# Patient Record
Sex: FEMALE | Race: WHITE | Employment: OTHER | URBAN - METROPOLITAN AREA
[De-identification: names, ages, dates, MRNs, and addresses within clinical notes are randomized per-mention and may not be internally consistent; named-entity substitution may affect disease eponyms.]

---

## 2017-01-02 ENCOUNTER — APPOINTMENT (OUTPATIENT)
Dept: SPEECH THERAPY | Facility: CLINIC | Age: 82
DRG: 641 | End: 2017-01-02
Payer: MEDICARE

## 2017-03-06 ENCOUNTER — MEDICAL CORRESPONDENCE (OUTPATIENT)
Dept: HEALTH INFORMATION MANAGEMENT | Facility: CLINIC | Age: 82
End: 2017-03-06

## 2017-04-21 ENCOUNTER — HOSPITAL ENCOUNTER (OUTPATIENT)
Dept: ULTRASOUND IMAGING | Facility: CLINIC | Age: 82
Discharge: HOME | End: 2017-04-21
Attending: PHYSICIAN ASSISTANT | Admitting: PHYSICIAN ASSISTANT
Payer: MEDICARE

## 2017-04-21 ENCOUNTER — OFFICE VISIT (OUTPATIENT)
Dept: URGENT CARE | Facility: URGENT CARE | Age: 82
End: 2017-04-21
Payer: MEDICARE

## 2017-04-21 VITALS
DIASTOLIC BLOOD PRESSURE: 80 MMHG | TEMPERATURE: 98.1 F | OXYGEN SATURATION: 91 % | BODY MASS INDEX: 23.03 KG/M2 | WEIGHT: 114 LBS | HEART RATE: 89 BPM | SYSTOLIC BLOOD PRESSURE: 124 MMHG

## 2017-04-21 DIAGNOSIS — I87.2 VENOUS (PERIPHERAL) INSUFFICIENCY: ICD-10-CM

## 2017-04-21 DIAGNOSIS — I87.2 VENOUS (PERIPHERAL) INSUFFICIENCY: Primary | ICD-10-CM

## 2017-04-21 DIAGNOSIS — L20.9 ATOPIC DERMATITIS, UNSPECIFIED TYPE: ICD-10-CM

## 2017-04-21 DIAGNOSIS — R22.43 LOCALIZED SWELLING OF BOTH LOWER LEGS: ICD-10-CM

## 2017-04-21 LAB
ALBUMIN SERPL-MCNC: 3.6 G/DL (ref 3.4–5)
ALP SERPL-CCNC: 67 U/L (ref 40–150)
ALT SERPL W P-5'-P-CCNC: 14 U/L (ref 0–50)
ANION GAP SERPL CALCULATED.3IONS-SCNC: 8 MMOL/L (ref 3–14)
AST SERPL W P-5'-P-CCNC: 9 U/L (ref 0–45)
BASOPHILS # BLD AUTO: 0 10E9/L (ref 0–0.2)
BASOPHILS NFR BLD AUTO: 0.4 %
BILIRUB SERPL-MCNC: 0.3 MG/DL (ref 0.2–1.3)
BUN SERPL-MCNC: 17 MG/DL (ref 7–30)
CALCIUM SERPL-MCNC: 9 MG/DL (ref 8.5–10.1)
CHLORIDE SERPL-SCNC: 107 MMOL/L (ref 94–109)
CO2 SERPL-SCNC: 27 MMOL/L (ref 20–32)
CREAT SERPL-MCNC: 1 MG/DL (ref 0.52–1.04)
D DIMER PPP FEU-MCNC: 1.1 UG/ML FEU (ref 0–0.5)
DIFFERENTIAL METHOD BLD: ABNORMAL
EOSINOPHIL # BLD AUTO: 0.6 10E9/L (ref 0–0.7)
EOSINOPHIL NFR BLD AUTO: 5.4 %
ERYTHROCYTE [DISTWIDTH] IN BLOOD BY AUTOMATED COUNT: 13.2 % (ref 10–15)
GFR SERPL CREATININE-BSD FRML MDRD: 52 ML/MIN/1.7M2
GLUCOSE SERPL-MCNC: 115 MG/DL (ref 70–99)
HCT VFR BLD AUTO: 34.8 % (ref 35–47)
HGB BLD-MCNC: 11 G/DL (ref 11.7–15.7)
LYMPHOCYTES # BLD AUTO: 2.1 10E9/L (ref 0.8–5.3)
LYMPHOCYTES NFR BLD AUTO: 20.1 %
MCH RBC QN AUTO: 32 PG (ref 26.5–33)
MCHC RBC AUTO-ENTMCNC: 31.6 G/DL (ref 31.5–36.5)
MCV RBC AUTO: 101 FL (ref 78–100)
MONOCYTES # BLD AUTO: 0.9 10E9/L (ref 0–1.3)
MONOCYTES NFR BLD AUTO: 8.3 %
NEUTROPHILS # BLD AUTO: 6.8 10E9/L (ref 1.6–8.3)
NEUTROPHILS NFR BLD AUTO: 65.8 %
PLATELET # BLD AUTO: 293 10E9/L (ref 150–450)
POTASSIUM SERPL-SCNC: 4 MMOL/L (ref 3.4–5.3)
PROT SERPL-MCNC: 7.8 G/DL (ref 6.8–8.8)
RBC # BLD AUTO: 3.44 10E12/L (ref 3.8–5.2)
SODIUM SERPL-SCNC: 142 MMOL/L (ref 133–144)
WBC # BLD AUTO: 10.3 10E9/L (ref 4–11)

## 2017-04-21 PROCEDURE — 36415 COLL VENOUS BLD VENIPUNCTURE: CPT | Performed by: PHYSICIAN ASSISTANT

## 2017-04-21 PROCEDURE — 80053 COMPREHEN METABOLIC PANEL: CPT | Performed by: PHYSICIAN ASSISTANT

## 2017-04-21 PROCEDURE — 85025 COMPLETE CBC W/AUTO DIFF WBC: CPT | Performed by: PHYSICIAN ASSISTANT

## 2017-04-21 PROCEDURE — 85379 FIBRIN DEGRADATION QUANT: CPT | Performed by: PHYSICIAN ASSISTANT

## 2017-04-21 PROCEDURE — 93970 EXTREMITY STUDY: CPT

## 2017-04-21 PROCEDURE — 99214 OFFICE O/P EST MOD 30 MIN: CPT | Performed by: PHYSICIAN ASSISTANT

## 2017-04-21 RX ORDER — TRIAMCINOLONE ACETONIDE 5 MG/G
CREAM TOPICAL
Qty: 30 G | Refills: 3 | Status: ON HOLD | OUTPATIENT
Start: 2017-04-21 | End: 2017-05-22

## 2017-04-21 RX ORDER — HYDROCHLOROTHIAZIDE 12.5 MG/1
12.5 TABLET ORAL DAILY
Qty: 30 TABLET | Refills: 0 | Status: SHIPPED | OUTPATIENT
Start: 2017-04-21 | End: 2017-04-21

## 2017-04-21 RX ORDER — HYDROCHLOROTHIAZIDE 12.5 MG/1
12.5 TABLET ORAL DAILY
Qty: 30 TABLET | Refills: 0 | Status: ON HOLD | OUTPATIENT
Start: 2017-04-21 | End: 2017-05-22

## 2017-04-21 NOTE — MR AVS SNAPSHOT
"              After Visit Summary   2017    Laya Lutz    MRN: [id number]           Patient Information     Date Of Birth          3/13/1926        Visit Information        Provider Department      2017 1:45 PM Joshua Jose PA-C [facility]        Today's Diagnoses     Venous (peripheral) insufficiency    -  1    Localized swelling of both lower legs        Atopic dermatitis, unspecified type           Follow-ups after your visit        Who to contact     If you have questions or need follow up information about today's clinic visit or your schedule please contact [facility] directly at [phone].  Normal or non-critical lab and imaging results will be communicated to you by MyChart, letter or phone within 4 business days after the clinic has received the results. If you do not hear from us within 7 days, please contact the clinic through MyChart or phone. If you have a critical or abnormal lab result, we will notify you by phone as soon as possible.  Submit refill requests through [employer] or call your pharmacy and they will forward the refill request to us. Please allow 3 business days for your refill to be completed.          Additional Information About Your Visit        MyChart Information     [employer] lets you send messages to your doctor, view your test results, renew your prescriptions, schedule appointments and more. To sign up, go to [email or web address] . Click on \"Log in\" on the left side of the screen, which will take you to the Welcome page. Then click on \"Sign up Now\" on the right side of the page.     You will be asked to enter the access code listed below, as well as some personal information. Please follow the directions to create your username and password.     Your access code is: [email or web address]  Expires: 2017  9:23 AM     Your access code will  in 90 days. If you need help or a new code, please call your " [facility] or [phone].        Care EveryWhere ID     This is your Care EveryWhere ID. This could be used by other organizations to access your [place] medical records  [id number]        Your Vitals Were     Pulse Temperature Pulse Oximetry BMI (Body Mass Index)          89 98.1  F (36.7  C) (Oral) 91% 23.03 kg/m2         Blood Pressure from Last 3 Encounters:   04/21/17 124/80   01/02/17 139/81   04/04/16 144/78    Weight from Last 3 Encounters:   04/21/17 114 lb (51.7 kg)   01/02/17 104 lb 15 oz (47.6 kg)   04/02/16 106 lb 4.8 oz (48.2 kg)              We Performed the Following     CBC with platelets differential     Comprehensive metabolic panel     D dimer quantitative          Today's Medication Changes          These changes are accurate as of: 4/21/17 11:59 PM.  If you have any questions, ask your nurse or doctor.               Start taking these medicines.        Dose/Directions    hydrochlorothiazide 12.5 MG Tabs tablet   Used for:  Localized swelling of both lower legs   Started by:  Joshua Jose PA-C        Dose:  12.5 mg   Take 1 tablet (12.5 mg) by mouth daily   Quantity:  30 tablet   Refills:  0       triamcinolone 0.5 % cream   Commonly known as:  KENALOG   Used for:  Atopic dermatitis, unspecified type   Started by:  Joshua Jose PA-C        Apply sparingly to affected area three times daily.   Quantity:  30 g   Refills:  3            Where to get your medicines      These medications were sent to [facility] [address] [place] [address] [address] AT [facility]  [address], [facility] [address]    Hours:  24-hours Phone:  [phone]     hydrochlorothiazide 12.5 MG Tabs tablet    triamcinolone 0.5 % cream                Primary Care Provider Office Phone # Fax #    Dayton Huston [phone]       [address] [address]        Thank you!     Thank you for choosing  [facility]  for your care. Our goal is always to provide you with excellent care. Hearing back from our patients is one way we can continue to improve our services. Please take a few minutes to complete the written survey that you may receive in the mail after your visit with us. Thank you!             Your Updated Medication List - Protect others around you: Learn how to safely use, store and throw away your medicines at www.disposemymeds.org.          This list is accurate as of: 4/21/17 11:59 PM.  Always use your most recent med list.                   Brand Name Dispense Instructions for use    acetaminophen 325 MG tablet    TYLENOL    100 tablet    Take 2 tablets (650 mg) by mouth every 4 hours as needed for mild pain       albuterol 108 (90 BASE) MCG/ACT Inhaler   Generic drug:  albuterol     2 Inhaler    INHALE 2 PUFFS BY MOUTH EVERY 4 TO 6 HOURS AS NEEDED FOR WHEEZE       AMLODIPINE BESYLATE PO      Take 5 mg by mouth daily       aspirin 81 MG EC tablet      Take 1 tablet (81 mg) by mouth daily       CENTRUM SILVER per tablet      Take 1 tablet by mouth daily.       citalopram 40 MG tablet    celeXA    90 tablet    TAKE ONE TABLET BY MOUTH DAILY       FOSAMAX PO      Take 70 mg by mouth once a week       GABAPENTIN PO      Take 900 mg by mouth At Bedtime (takes 3 x 300 mg caps)       hydrochlorothiazide 12.5 MG Tabs tablet     30 tablet    Take 1 tablet (12.5 mg) by mouth daily       HYDROcodone-acetaminophen  MG per tablet    NORCO    10 tablet    Take 1 tablet by mouth every 6 hours as needed for moderate to severe pain Up to 3 times daily as needed       LISINOPRIL PO      Take 40 mg by mouth daily       metoprolol 25 MG 24 hr tablet    TOPROL-XL    90 tablet    Take 1 tablet by mouth daily.       omeprazole 20 MG CR capsule    priLOSEC    30 capsule    TAKE ONE CAPSULE BY MOUTH DAILY 30 MINUTES BEFORE BREAKFAST       order for DME     1 Device    Equipment being  ordered: rib belt       SERTRALINE HCL PO      Take 50 mg by mouth daily       SINGULAIR 10 MG tablet   Generic drug:  montelukast      Take 10 mg by mouth At Bedtime.       triamcinolone 0.5 % cream    KENALOG    30 g    Apply sparingly to affected area three times daily.

## 2017-04-21 NOTE — PROGRESS NOTES
[facility] radiology called and informed that there was no blood clot   I spoke to Pt's daughter who is with pt and notified about the result   Pt would start HCTZ from today and also do compression stockings   If symptoms don't get better should f/u with PCP   Myrna Chan MD

## 2017-04-21 NOTE — NURSING NOTE
"Chief Complaint   Patient presents with     Swelling     in lower legs x 2 days       Initial /80 (BP Location: Right arm, Patient Position: Chair, Cuff Size: Adult Regular)  Pulse 89  Temp 98.1  F (36.7  C) (Oral)  Wt 114 lb (51.7 kg)  SpO2 91%  BMI 23.03 kg/m2 Estimated body mass index is 23.03 kg/(m^2) as calculated from the following:    Height as of 12/28/16: 4' 11\" (1.499 m).    Weight as of this encounter: 114 lb (51.7 kg).  Medication Reconciliation: complete  "

## 2017-04-24 NOTE — PROGRESS NOTES
SUBJECTIVE:   Laya Lutz is a 89 year old female presenting with a chief complaint of lower leg swelling bilaterally.  Onset of symptoms was few days  Course of illness is same.    Severity moderate  Current and Associated symptoms: lower leg swelling  Treatment measures tried include OTC meds.  Predisposing factors include none.    Past Medical History:   Diagnosis Date     Asthma      Depression      Unspecified essential hypertension         Allergies   Allergen Reactions     Fish Allergy      Throat swelled, can tolerate shrimp,crab     Novocain [Procaine Hcl] Anaphylaxis     Face swelled difficulty breathing     Fosamax [Alendronate Sodium]      Penicillins      Unsure of reaction         Social History   Substance Use Topics     Smoking status: Never Smoker     Smokeless tobacco: Never Used     Alcohol use Yes      Comment: occasionally       ROS:  CONSTITUTIONAL:NEGATIVE for fever, chills, change in weight  INTEGUMENTARY/SKIN: POSITIVE for lower leg swelling  ENT/MOUTH: NEGATIVE for ear, mouth and throat problems  RESP:NEGATIVE for significant cough or SOB  CV: NEGATIVE for chest pain, palpitations or peripheral edema  GI: NEGATIVE for nausea, abdominal pain, heartburn, or change in bowel habits  MUSCULOSKELETAL: Positive for edema lower legs bilaterally  NEURO: NEGATIVE for weakness, dizziness or paresthesias    OBJECTIVE  :/80 (BP Location: Right arm, Patient Position: Chair, Cuff Size: Adult Regular)  Pulse 89  Temp 98.1  F (36.7  C) (Oral)  Wt 114 lb (51.7 kg)  SpO2 91%  BMI 23.03 kg/m2  GENERAL APPEARANCE: healthy, alert and no distress  EYES: EOMI,  PERRL, conjunctiva clear  HENT: ear canals and TM's normal.  Nose and mouth without ulcers, erythema or lesions  NECK: supple, nontender, no lymphadenopathy  RESP: lungs clear to auscultation - no rales, rhonchi or wheezes  CV: regular rates and rhythm, normal S1 S2, no murmur noted  ABDOMEN:  soft, nontender, no HSM or masses and bowel  sounds normal  Extremities: Positive for lower leg edema  MS: Positive for lower leg swelling bilaterally  NEURO: Normal strength and tone, sensory exam grossly normal,  normal speech and mentation  SKIN: no suspicious lesions or rashes    Results for orders placed or performed in visit on 04/21/17   CBC with platelets differential   Result Value Ref Range    WBC 10.3 4.0 - 11.0 10e9/L    RBC Count 3.44 (L) 3.8 - 5.2 10e12/L    Hemoglobin 11.0 (L) 11.7 - 15.7 g/dL    Hematocrit 34.8 (L) 35.0 - 47.0 %     (H) 78 - 100 fl    MCH 32.0 26.5 - 33.0 pg    MCHC 31.6 31.5 - 36.5 g/dL    RDW 13.2 10.0 - 15.0 %    Platelet Count 293 150 - 450 10e9/L    Diff Method Automated Method     % Neutrophils 65.8 %    % Lymphocytes 20.1 %    % Monocytes 8.3 %    % Eosinophils 5.4 %    % Basophils 0.4 %    Absolute Neutrophil 6.8 1.6 - 8.3 10e9/L    Absolute Lymphocytes 2.1 0.8 - 5.3 10e9/L    Absolute Monocytes 0.9 0.0 - 1.3 10e9/L    Absolute Eosinophils 0.6 0.0 - 0.7 10e9/L    Absolute Basophils 0.0 0.0 - 0.2 10e9/L   Comprehensive metabolic panel   Result Value Ref Range    Sodium 142 133 - 144 mmol/L    Potassium 4.0 3.4 - 5.3 mmol/L    Chloride 107 94 - 109 mmol/L    Carbon Dioxide 27 20 - 32 mmol/L    Anion Gap 8 3 - 14 mmol/L    Glucose 115 (H) 70 - 99 mg/dL    Urea Nitrogen 17 7 - 30 mg/dL    Creatinine 1.00 0.52 - 1.04 mg/dL    GFR Estimate 52 (L) >60 mL/min/1.7m2    GFR Estimate If Black 63 >60 mL/min/1.7m2    Calcium 9.0 8.5 - 10.1 mg/dL    Bilirubin Total 0.3 0.2 - 1.3 mg/dL    Albumin 3.6 3.4 - 5.0 g/dL    Protein Total 7.8 6.8 - 8.8 g/dL    Alkaline Phosphatase 67 40 - 150 U/L    ALT 14 0 - 50 U/L    AST 9 0 - 45 U/L   D dimer quantitative   Result Value Ref Range    D Dimer 1.1 (H) 0.0 - 0.50 ug/ml FEU     Leg ultrasound negative for DVT bilaterally    ASSESSMENT/PLAN:      ICD-10-CM    1. Venous (peripheral) insufficiency I87.2 CBC with platelets differential     Comprehensive metabolic panel     D dimer  quantitative     US Lower Extremity Venous Duplex Bilateral   2. Localized swelling of both lower legs R22.43 CBC with platelets differential     Comprehensive metabolic panel     D dimer quantitative     US Lower Extremity Venous Duplex Bilateral     hydrochlorothiazide 12.5 MG TABS tablet     DISCONTINUED: hydrochlorothiazide 12.5 MG TABS tablet   3. Atopic dermatitis, unspecified type L20.9 triamcinolone (KENALOG) 0.5 % cream       Left elevation  Compression stockings  Follow up with PCP as needed        See orders in Epic

## 2017-05-22 ENCOUNTER — HOSPITAL ENCOUNTER (INPATIENT)
Facility: CLINIC | Age: 82
LOS: 3 days | Discharge: NURSING, REHAB OR LONG-TERM CARE | DRG: 202 | End: 2017-05-25
Attending: EMERGENCY MEDICINE | Admitting: HOSPITALIST
Payer: MEDICARE

## 2017-05-22 ENCOUNTER — APPOINTMENT (OUTPATIENT)
Dept: GENERAL RADIOLOGY | Facility: CLINIC | Age: 82
DRG: 202 | End: 2017-05-22
Attending: EMERGENCY MEDICINE
Payer: MEDICARE

## 2017-05-22 DIAGNOSIS — R53.1 GENERAL WEAKNESS: ICD-10-CM

## 2017-05-22 DIAGNOSIS — J45.21 MILD INTERMITTENT ASTHMA WITH ACUTE EXACERBATION: Primary | ICD-10-CM

## 2017-05-22 DIAGNOSIS — J45.901 ASTHMA EXACERBATION: ICD-10-CM

## 2017-05-22 DIAGNOSIS — I48.91 ATRIAL FIBRILLATION WITH RVR (H): ICD-10-CM

## 2017-05-22 DIAGNOSIS — I10 ESSENTIAL HYPERTENSION: ICD-10-CM

## 2017-05-22 LAB
ALBUMIN UR-MCNC: 100 MG/DL
ANION GAP SERPL CALCULATED.3IONS-SCNC: 13 MMOL/L (ref 3–14)
APPEARANCE UR: ABNORMAL
BACTERIA #/AREA URNS HPF: ABNORMAL /HPF
BASOPHILS # BLD AUTO: 0 10E9/L (ref 0–0.2)
BASOPHILS NFR BLD AUTO: 0.4 %
BILIRUB UR QL STRIP: NEGATIVE
BUN SERPL-MCNC: 23 MG/DL (ref 7–30)
CALCIUM SERPL-MCNC: 9.3 MG/DL (ref 8.5–10.1)
CHLORIDE SERPL-SCNC: 101 MMOL/L (ref 94–109)
CO2 SERPL-SCNC: 22 MMOL/L (ref 20–32)
COLOR UR AUTO: YELLOW
CREAT SERPL-MCNC: 0.97 MG/DL (ref 0.52–1.04)
DIFFERENTIAL METHOD BLD: NORMAL
EOSINOPHIL # BLD AUTO: 0.1 10E9/L (ref 0–0.7)
EOSINOPHIL NFR BLD AUTO: 0.8 %
ERYTHROCYTE [DISTWIDTH] IN BLOOD BY AUTOMATED COUNT: 13.5 % (ref 10–15)
GFR SERPL CREATININE-BSD FRML MDRD: 54 ML/MIN/1.7M2
GLUCOSE SERPL-MCNC: 123 MG/DL (ref 70–99)
GLUCOSE UR STRIP-MCNC: NEGATIVE MG/DL
GRAM STN SPEC: NORMAL
HCT VFR BLD AUTO: 38.5 % (ref 35–47)
HGB BLD-MCNC: 12.9 G/DL (ref 11.7–15.7)
HGB UR QL STRIP: ABNORMAL
IMM GRANULOCYTES # BLD: 0 10E9/L (ref 0–0.4)
IMM GRANULOCYTES NFR BLD: 0.4 %
INTERPRETATION ECG - MUSE: NORMAL
KETONES UR STRIP-MCNC: 10 MG/DL
LACTATE BLD-SCNC: 1.4 MMOL/L (ref 0.7–2.1)
LEUKOCYTE ESTERASE UR QL STRIP: ABNORMAL
LYMPHOCYTES # BLD AUTO: 2 10E9/L (ref 0.8–5.3)
LYMPHOCYTES NFR BLD AUTO: 25.3 %
Lab: NORMAL
MAGNESIUM SERPL-MCNC: 1.6 MG/DL (ref 1.6–2.3)
MCH RBC QN AUTO: 31 PG (ref 26.5–33)
MCHC RBC AUTO-ENTMCNC: 33.5 G/DL (ref 31.5–36.5)
MCV RBC AUTO: 93 FL (ref 78–100)
MICRO REPORT STATUS: NORMAL
MONOCYTES # BLD AUTO: 0.6 10E9/L (ref 0–1.3)
MONOCYTES NFR BLD AUTO: 7.6 %
NEUTROPHILS # BLD AUTO: 5.1 10E9/L (ref 1.6–8.3)
NEUTROPHILS NFR BLD AUTO: 65.5 %
NITRATE UR QL: NEGATIVE
PH UR STRIP: 6 PH (ref 5–7)
PLATELET # BLD AUTO: 286 10E9/L (ref 150–450)
POTASSIUM SERPL-SCNC: 3.8 MMOL/L (ref 3.4–5.3)
RBC # BLD AUTO: 4.16 10E12/L (ref 3.8–5.2)
RBC #/AREA URNS AUTO: 1 /HPF (ref 0–2)
SODIUM SERPL-SCNC: 136 MMOL/L (ref 133–144)
SP GR UR STRIP: 1.02 (ref 1–1.03)
SPECIMEN SOURCE: NORMAL
TROPONIN I SERPL-MCNC: NORMAL UG/L (ref 0–0.04)
TROPONIN I SERPL-MCNC: NORMAL UG/L (ref 0–0.04)
TSH SERPL DL<=0.005 MIU/L-ACNC: 1.25 MU/L (ref 0.4–4)
URN SPEC COLLECT METH UR: ABNORMAL
UROBILINOGEN UR STRIP-MCNC: NORMAL MG/DL (ref 0–2)
WBC # BLD AUTO: 7.7 10E9/L (ref 4–11)
WBC #/AREA URNS AUTO: 10 /HPF (ref 0–2)

## 2017-05-22 PROCEDURE — 85025 COMPLETE CBC W/AUTO DIFF WBC: CPT | Performed by: EMERGENCY MEDICINE

## 2017-05-22 PROCEDURE — 25000125 ZZHC RX 250: Performed by: HOSPITALIST

## 2017-05-22 PROCEDURE — A9270 NON-COVERED ITEM OR SERVICE: HCPCS | Mod: GY | Performed by: EMERGENCY MEDICINE

## 2017-05-22 PROCEDURE — 25000125 ZZHC RX 250: Performed by: EMERGENCY MEDICINE

## 2017-05-22 PROCEDURE — 99207 ZZC DOWN CODE DUE TO INITIAL EXAM: CPT | Performed by: HOSPITALIST

## 2017-05-22 PROCEDURE — 83605 ASSAY OF LACTIC ACID: CPT | Performed by: HOSPITALIST

## 2017-05-22 PROCEDURE — 96374 THER/PROPH/DIAG INJ IV PUSH: CPT

## 2017-05-22 PROCEDURE — 25000128 H RX IP 250 OP 636: Performed by: EMERGENCY MEDICINE

## 2017-05-22 PROCEDURE — 99285 EMERGENCY DEPT VISIT HI MDM: CPT | Mod: 25

## 2017-05-22 PROCEDURE — 36415 COLL VENOUS BLD VENIPUNCTURE: CPT | Performed by: HOSPITALIST

## 2017-05-22 PROCEDURE — 87070 CULTURE OTHR SPECIMN AEROBIC: CPT | Performed by: HOSPITALIST

## 2017-05-22 PROCEDURE — 87040 BLOOD CULTURE FOR BACTERIA: CPT | Performed by: EMERGENCY MEDICINE

## 2017-05-22 PROCEDURE — 36415 COLL VENOUS BLD VENIPUNCTURE: CPT

## 2017-05-22 PROCEDURE — 94640 AIRWAY INHALATION TREATMENT: CPT

## 2017-05-22 PROCEDURE — 84484 ASSAY OF TROPONIN QUANT: CPT | Performed by: HOSPITALIST

## 2017-05-22 PROCEDURE — 99221 1ST HOSP IP/OBS SF/LOW 40: CPT | Mod: AI | Performed by: HOSPITALIST

## 2017-05-22 PROCEDURE — 40000275 ZZH STATISTIC RCP TIME EA 10 MIN

## 2017-05-22 PROCEDURE — 83735 ASSAY OF MAGNESIUM: CPT | Performed by: HOSPITALIST

## 2017-05-22 PROCEDURE — 25000132 ZZH RX MED GY IP 250 OP 250 PS 637: Mod: GY | Performed by: HOSPITALIST

## 2017-05-22 PROCEDURE — 87205 SMEAR GRAM STAIN: CPT | Performed by: HOSPITALIST

## 2017-05-22 PROCEDURE — 25000132 ZZH RX MED GY IP 250 OP 250 PS 637: Mod: GY | Performed by: EMERGENCY MEDICINE

## 2017-05-22 PROCEDURE — 93005 ELECTROCARDIOGRAM TRACING: CPT

## 2017-05-22 PROCEDURE — 87088 URINE BACTERIA CULTURE: CPT | Performed by: HOSPITALIST

## 2017-05-22 PROCEDURE — 87086 URINE CULTURE/COLONY COUNT: CPT | Performed by: HOSPITALIST

## 2017-05-22 PROCEDURE — 94640 AIRWAY INHALATION TREATMENT: CPT | Mod: 76

## 2017-05-22 PROCEDURE — 71020 XR CHEST 2 VW: CPT

## 2017-05-22 PROCEDURE — 80048 BASIC METABOLIC PNL TOTAL CA: CPT | Performed by: EMERGENCY MEDICINE

## 2017-05-22 PROCEDURE — 25000128 H RX IP 250 OP 636: Performed by: HOSPITALIST

## 2017-05-22 PROCEDURE — 12000000 ZZH R&B MED SURG/OB

## 2017-05-22 PROCEDURE — 81001 URINALYSIS AUTO W/SCOPE: CPT | Performed by: EMERGENCY MEDICINE

## 2017-05-22 PROCEDURE — 84443 ASSAY THYROID STIM HORMONE: CPT | Performed by: HOSPITALIST

## 2017-05-22 PROCEDURE — 87186 SC STD MICRODIL/AGAR DIL: CPT | Performed by: HOSPITALIST

## 2017-05-22 PROCEDURE — A9270 NON-COVERED ITEM OR SERVICE: HCPCS | Mod: GY | Performed by: HOSPITALIST

## 2017-05-22 RX ORDER — METOPROLOL SUCCINATE 25 MG/1
25 TABLET, EXTENDED RELEASE ORAL DAILY
Status: DISCONTINUED | OUTPATIENT
Start: 2017-05-22 | End: 2017-05-25 | Stop reason: HOSPADM

## 2017-05-22 RX ORDER — POTASSIUM CHLORIDE 7.45 MG/ML
10 INJECTION INTRAVENOUS
Status: DISCONTINUED | OUTPATIENT
Start: 2017-05-22 | End: 2017-05-25 | Stop reason: HOSPADM

## 2017-05-22 RX ORDER — HYDROCODONE BITARTRATE AND ACETAMINOPHEN 5; 325 MG/1; MG/1
1 TABLET ORAL 2 TIMES DAILY PRN
Status: ON HOLD | COMMUNITY
End: 2017-05-25

## 2017-05-22 RX ORDER — SODIUM CHLORIDE 9 MG/ML
INJECTION, SOLUTION INTRAVENOUS CONTINUOUS
Status: DISCONTINUED | OUTPATIENT
Start: 2017-05-22 | End: 2017-05-23

## 2017-05-22 RX ORDER — MONTELUKAST SODIUM 10 MG/1
10 TABLET ORAL AT BEDTIME
Status: DISCONTINUED | OUTPATIENT
Start: 2017-05-22 | End: 2017-05-25 | Stop reason: HOSPADM

## 2017-05-22 RX ORDER — BISACODYL 10 MG
10 SUPPOSITORY, RECTAL RECTAL DAILY PRN
Status: DISCONTINUED | OUTPATIENT
Start: 2017-05-22 | End: 2017-05-25 | Stop reason: HOSPADM

## 2017-05-22 RX ORDER — HYDROCODONE BITARTRATE AND ACETAMINOPHEN 5; 325 MG/1; MG/1
1 TABLET ORAL 2 TIMES DAILY PRN
Status: DISCONTINUED | OUTPATIENT
Start: 2017-05-22 | End: 2017-05-25 | Stop reason: HOSPADM

## 2017-05-22 RX ORDER — ASPIRIN 81 MG/1
81 TABLET ORAL DAILY
Status: DISCONTINUED | OUTPATIENT
Start: 2017-05-22 | End: 2017-05-22

## 2017-05-22 RX ORDER — AMLODIPINE BESYLATE 5 MG/1
5 TABLET ORAL DAILY
Status: DISCONTINUED | OUTPATIENT
Start: 2017-05-22 | End: 2017-05-25 | Stop reason: HOSPADM

## 2017-05-22 RX ORDER — NALOXONE HYDROCHLORIDE 0.4 MG/ML
.1-.4 INJECTION, SOLUTION INTRAMUSCULAR; INTRAVENOUS; SUBCUTANEOUS
Status: DISCONTINUED | OUTPATIENT
Start: 2017-05-22 | End: 2017-05-25 | Stop reason: HOSPADM

## 2017-05-22 RX ORDER — LISINOPRIL 40 MG/1
40 TABLET ORAL DAILY
Status: DISCONTINUED | OUTPATIENT
Start: 2017-05-22 | End: 2017-05-25 | Stop reason: HOSPADM

## 2017-05-22 RX ORDER — CITALOPRAM HYDROBROMIDE 40 MG/1
40 TABLET ORAL DAILY
Status: DISCONTINUED | OUTPATIENT
Start: 2017-05-22 | End: 2017-05-25 | Stop reason: HOSPADM

## 2017-05-22 RX ORDER — POTASSIUM CHLORIDE 1500 MG/1
20-40 TABLET, EXTENDED RELEASE ORAL
Status: DISCONTINUED | OUTPATIENT
Start: 2017-05-22 | End: 2017-05-25 | Stop reason: HOSPADM

## 2017-05-22 RX ORDER — POLYETHYLENE GLYCOL 3350 17 G/17G
17 POWDER, FOR SOLUTION ORAL DAILY PRN
Status: DISCONTINUED | OUTPATIENT
Start: 2017-05-22 | End: 2017-05-25 | Stop reason: HOSPADM

## 2017-05-22 RX ORDER — AMOXICILLIN 250 MG
1-2 CAPSULE ORAL 2 TIMES DAILY PRN
Status: DISCONTINUED | OUTPATIENT
Start: 2017-05-22 | End: 2017-05-25 | Stop reason: HOSPADM

## 2017-05-22 RX ORDER — ACETAMINOPHEN 325 MG/1
650 TABLET ORAL EVERY 4 HOURS PRN
Status: DISCONTINUED | OUTPATIENT
Start: 2017-05-22 | End: 2017-05-25 | Stop reason: HOSPADM

## 2017-05-22 RX ORDER — POTASSIUM CL/LIDO/0.9 % NACL 10MEQ/0.1L
10 INTRAVENOUS SOLUTION, PIGGYBACK (ML) INTRAVENOUS
Status: DISCONTINUED | OUTPATIENT
Start: 2017-05-22 | End: 2017-05-25 | Stop reason: HOSPADM

## 2017-05-22 RX ORDER — GABAPENTIN 300 MG/1
900 CAPSULE ORAL AT BEDTIME
Status: DISCONTINUED | OUTPATIENT
Start: 2017-05-22 | End: 2017-05-25 | Stop reason: HOSPADM

## 2017-05-22 RX ORDER — ALBUTEROL SULFATE 0.83 MG/ML
3 SOLUTION RESPIRATORY (INHALATION)
Status: DISCONTINUED | OUTPATIENT
Start: 2017-05-22 | End: 2017-05-25 | Stop reason: HOSPADM

## 2017-05-22 RX ORDER — POTASSIUM CHLORIDE 29.8 MG/ML
20 INJECTION INTRAVENOUS
Status: DISCONTINUED | OUTPATIENT
Start: 2017-05-22 | End: 2017-05-25 | Stop reason: HOSPADM

## 2017-05-22 RX ORDER — ALBUTEROL SULFATE 90 UG/1
2 AEROSOL, METERED RESPIRATORY (INHALATION) 4 TIMES DAILY PRN
Status: DISCONTINUED | OUTPATIENT
Start: 2017-05-22 | End: 2017-05-24

## 2017-05-22 RX ORDER — ONDANSETRON 2 MG/ML
4 INJECTION INTRAMUSCULAR; INTRAVENOUS EVERY 6 HOURS PRN
Status: DISCONTINUED | OUTPATIENT
Start: 2017-05-22 | End: 2017-05-25 | Stop reason: HOSPADM

## 2017-05-22 RX ORDER — CIPROFLOXACIN 2 MG/ML
400 INJECTION, SOLUTION INTRAVENOUS DAILY
Status: DISCONTINUED | OUTPATIENT
Start: 2017-05-22 | End: 2017-05-24

## 2017-05-22 RX ORDER — POTASSIUM CHLORIDE 1.5 G/1.58G
20-40 POWDER, FOR SOLUTION ORAL
Status: DISCONTINUED | OUTPATIENT
Start: 2017-05-22 | End: 2017-05-25 | Stop reason: HOSPADM

## 2017-05-22 RX ORDER — ONDANSETRON 4 MG/1
4 TABLET, ORALLY DISINTEGRATING ORAL EVERY 6 HOURS PRN
Status: DISCONTINUED | OUTPATIENT
Start: 2017-05-22 | End: 2017-05-25 | Stop reason: HOSPADM

## 2017-05-22 RX ORDER — ALBUTEROL SULFATE 0.83 MG/ML
5 SOLUTION RESPIRATORY (INHALATION) ONCE
Status: COMPLETED | OUTPATIENT
Start: 2017-05-22 | End: 2017-05-22

## 2017-05-22 RX ORDER — NALOXONE HYDROCHLORIDE 0.4 MG/ML
.1-.4 INJECTION, SOLUTION INTRAMUSCULAR; INTRAVENOUS; SUBCUTANEOUS
Status: DISCONTINUED | OUTPATIENT
Start: 2017-05-22 | End: 2017-05-23

## 2017-05-22 RX ORDER — ACETAMINOPHEN 325 MG/1
650 TABLET ORAL ONCE
Status: COMPLETED | OUTPATIENT
Start: 2017-05-22 | End: 2017-05-22

## 2017-05-22 RX ORDER — METOPROLOL TARTRATE 1 MG/ML
5 INJECTION, SOLUTION INTRAVENOUS EVERY 6 HOURS
Status: DISCONTINUED | OUTPATIENT
Start: 2017-05-22 | End: 2017-05-23

## 2017-05-22 RX ORDER — METHYLPREDNISOLONE SODIUM SUCCINATE 125 MG/2ML
60 INJECTION, POWDER, LYOPHILIZED, FOR SOLUTION INTRAMUSCULAR; INTRAVENOUS EVERY 12 HOURS
Status: DISCONTINUED | OUTPATIENT
Start: 2017-05-23 | End: 2017-05-23

## 2017-05-22 RX ORDER — ALBUTEROL SULFATE 0.83 MG/ML
3 SOLUTION RESPIRATORY (INHALATION) EVERY 4 HOURS
Status: DISCONTINUED | OUTPATIENT
Start: 2017-05-22 | End: 2017-05-23

## 2017-05-22 RX ORDER — LIDOCAINE 40 MG/G
CREAM TOPICAL
Status: DISCONTINUED | OUTPATIENT
Start: 2017-05-22 | End: 2017-05-25 | Stop reason: HOSPADM

## 2017-05-22 RX ORDER — ACETAMINOPHEN 650 MG/1
650 SUPPOSITORY RECTAL EVERY 4 HOURS PRN
Status: DISCONTINUED | OUTPATIENT
Start: 2017-05-22 | End: 2017-05-25 | Stop reason: HOSPADM

## 2017-05-22 RX ORDER — METHYLPREDNISOLONE SODIUM SUCCINATE 125 MG/2ML
125 INJECTION, POWDER, LYOPHILIZED, FOR SOLUTION INTRAMUSCULAR; INTRAVENOUS ONCE
Status: COMPLETED | OUTPATIENT
Start: 2017-05-22 | End: 2017-05-22

## 2017-05-22 RX ADMIN — ALBUTEROL SULFATE 2.5 MG: 2.5 SOLUTION RESPIRATORY (INHALATION) at 23:54

## 2017-05-22 RX ADMIN — GABAPENTIN 900 MG: 300 CAPSULE ORAL at 20:30

## 2017-05-22 RX ADMIN — ACETAMINOPHEN 650 MG: 325 TABLET, FILM COATED ORAL at 12:34

## 2017-05-22 RX ADMIN — ALBUTEROL SULFATE 2 PUFF: 90 AEROSOL, METERED RESPIRATORY (INHALATION) at 18:09

## 2017-05-22 RX ADMIN — ACETAMINOPHEN 650 MG: 325 TABLET, FILM COATED ORAL at 18:59

## 2017-05-22 RX ADMIN — LISINOPRIL 40 MG: 40 TABLET ORAL at 17:03

## 2017-05-22 RX ADMIN — ALBUTEROL SULFATE 2 PUFF: 90 AEROSOL, METERED RESPIRATORY (INHALATION) at 20:39

## 2017-05-22 RX ADMIN — CITALOPRAM HYDROBROMIDE 40 MG: 40 TABLET ORAL at 17:04

## 2017-05-22 RX ADMIN — METHYLPREDNISOLONE SODIUM SUCCINATE 125 MG: 125 INJECTION, POWDER, FOR SOLUTION INTRAMUSCULAR; INTRAVENOUS at 13:16

## 2017-05-22 RX ADMIN — METOPROLOL TARTRATE 5 MG: 5 INJECTION INTRAVENOUS at 14:59

## 2017-05-22 RX ADMIN — CIPROFLOXACIN 400 MG: 2 INJECTION, SOLUTION INTRAVENOUS at 15:07

## 2017-05-22 RX ADMIN — ALBUTEROL SULFATE 2.5 MG: 2.5 SOLUTION RESPIRATORY (INHALATION) at 15:19

## 2017-05-22 RX ADMIN — MONTELUKAST SODIUM 10 MG: 10 TABLET, FILM COATED ORAL at 20:30

## 2017-05-22 RX ADMIN — SERTRALINE HYDROCHLORIDE 50 MG: 50 TABLET ORAL at 17:04

## 2017-05-22 RX ADMIN — SODIUM CHLORIDE: 9 INJECTION, SOLUTION INTRAVENOUS at 14:25

## 2017-05-22 RX ADMIN — METOPROLOL SUCCINATE 25 MG: 25 TABLET, EXTENDED RELEASE ORAL at 17:12

## 2017-05-22 RX ADMIN — ALBUTEROL SULFATE 2.5 MG: 2.5 SOLUTION RESPIRATORY (INHALATION) at 19:11

## 2017-05-22 RX ADMIN — METOPROLOL TARTRATE 5 MG: 5 INJECTION INTRAVENOUS at 20:30

## 2017-05-22 RX ADMIN — ALBUTEROL SULFATE 5 MG: 2.5 SOLUTION RESPIRATORY (INHALATION) at 11:45

## 2017-05-22 RX ADMIN — AMLODIPINE BESYLATE 5 MG: 5 TABLET ORAL at 17:05

## 2017-05-22 RX ADMIN — OMEPRAZOLE 20 MG: 20 CAPSULE, DELAYED RELEASE ORAL at 17:02

## 2017-05-22 RX ADMIN — HYDROCODONE BITARTRATE AND ACETAMINOPHEN 1 TABLET: 5; 325 TABLET ORAL at 20:02

## 2017-05-22 ASSESSMENT — ENCOUNTER SYMPTOMS
DIARRHEA: 0
COUGH: 1
HEMATURIA: 0
WHEEZING: 1
LIGHT-HEADEDNESS: 0
PALPITATIONS: 0
SHORTNESS OF BREATH: 1
DYSURIA: 0

## 2017-05-22 NOTE — IP AVS SNAPSHOT
` `     [place] [address] [facility]: [phone]                 INTERAGENCY TRANSFER FORM - NOTES (H&P, Discharge Summary, Consults, Procedures, Therapies)   2017                    Hospital Admission Date: 2017  АННА GUZMAN   : 3/13/1926  Sex: Female        Patient PCP Information     Provider PCP Type    Dayton Huston MD General      History & Physicals     No notes of this type exist for this encounter.      Discharge Summaries     No notes of this type exist for this encounter.      Consult Notes     No notes of this type exist for this encounter.         Progress Notes - Physician (Notes from 17 through 17)      Progress Notes by Rosie Valerio LSW at 2017 10:51 AM     Author:  Rosie Valerio LSW Service:  Social Work Author Type:      Filed:  2017 10:54 AM Date of Service:  2017 10:51 AM Creation Time:  2017 10:51 AM    Status:  Signed :  Rosie Valerio LSW ()         Care Transition Initial Assessment -   Reason For Consult: discharge planning, facility placement  Met with: PATIENT    Active Problems:    Asthma exacerbation         DATA  Lives With: alone  Living Arrangements[facility]  Description of Support System: Involved. Per pt, her daughter Etta and granddaughter Shelli check on her daily. Etta sets up her medications. She has two meals per day in the dining room. Does not use home O2.  Who is your support system?: Children  Identified issues/concerns regarding health management: PT/OT recommends TCU. Pt is in agreement. She would like to go to [facility].      Quality Of Family Relationships: involved  Transportation Available: family or friend will provide    ASSESSMENT  Cognitive Status:  Appears alert and oriented.   Concerns to be addressed:  On-going DC planning.     PLAN  Financial costs for the patient includes: None at this time.  Patient given options and choices for discharge: Yes.  Patient/family is agreeable to the plan?  YES  Patient Goals and Preferences: DC to TCU.  Patient anticipates discharging to:  TCU.    MATTY Colindres  [id number][JJ1.1]           Revision History        User Key Date/Time User Provider Type Action    > JJ1.1 5/24/2017 10:54 AM Rosie Valerio LSW  Sign            Progress Notes by Ruben Mir MD at 5/24/2017 10:06 AM     Author:  Ruben Mir MD Service:  Hospitalist Author Type:  Physician    Filed:  5/24/2017 10:16 AM Date of Service:  5/24/2017 10:06 AM Creation Time:  5/24/2017 10:06 AM    Status:  Signed :  Ruben Mir MD (Physician)         [facility]    Hospitalist Progress Note    Date of Service (when I saw the patient): 05/24/2017    Assessment & Plan   Laya Lutz is a 89 year-old female with a past medical history of hypertension, atrial fibrillation, asthma, depression and spinal stenosis who was admitted on 5/22/2017 with dyspnea, generalized weakness.     Asthma exacerbation secondary to acute bronchitis   Patient with recent URI-type symptoms, as well as several recent sick contacts with similar.   - Reports noticeable improvement since admission, now feeling that her dyspnea is at baseline  - Continue oral prednisone 60 mg daily (started 5/24/17)  - Change from scheduled albuterol nebs to PRN albuterol inhl given improvement  - Suspect any respiratory infection likely viral in nature based on history, w/o underlying COPD, therefore will complete antibiotic course[IM1.1] 5/24/2017[IM1.2] as below   - She reports she typically uses her albuterol inhaler a few times a day at baseline. She is not presently on any controller medications, reports she had been in the past however, with no specific reason why she is not now. Will start  budesonide inhl, with teaching with first dose.   - Continue PTA montelukast     Atrial fibrillation with rapid ventricular response  Suspect secondary to #1. Not on anticoagulation prior to admission, CHADS2-VASc at least 4.   - Improved rate control. Continue prior to admission metoprolol XL. Metoprolol IV available PRN for severe HR elevations    Possible urinary tract infection versus asymptomatic bacteruria   Urinalysis on admission mildly abnormal with 10 WBC, moderate LE. Negative nitrite. Patient notes change in stream strength over past 1-2 weeks, but no clear infectious symptoms. >100k E coli + >100k GNR noted on preliminary culture results.  - Will complete 3 day course of ciprofloxacin[IM1.1] 5/24/2017[IM1.3]    Physical deconditioning  Multifactorial in setting of above  - PT/OT consulted. Currently recommending TCU, which patient is open to but reluctant and [facility]. Reassess[IM1.1] 5/24/2017[IM1.4]     Hypertension  - Blood pressure mildly elevated, steroids are likely contributing. Continue prior to admission lisinopril, metoprolol XL, amlodipine    Depression  Anxiety  Continue prior to admit sertraline and citalopram.     Non-anion gap metabolic acidosis  Suspect secondary to IVF.   - Normalized with fluids discontinued     DVT Prophylaxis: Pneumatic Compression Devices  Code Status: DNR/DNI    Disposition: Possible discharge this afternoon if weaned from oxygen, pending therapy reassessments on discharge location.      Ruben Mir    Interval History   No acute events overnight. Breathing continues to feel improved, she now feels she is at her baseline. Denies any urinary symptoms. Denies any chest pain, n/v, abdominal pain.     -Data reviewed today: I reviewed all new labs and imaging results over the last 24 hours. I personally reviewed no images or EKG's today.    Physical Exam   Temp: 97.9  F (36.6  C) Temp src: Oral BP: 148/82 Pulse: 90 Heart Rate: 85  Resp: 16 SpO2: 93 % O2 Device: Nasal cannula Oxygen Delivery: 2 LPM  Vitals:    05/22/17 1024 05/23/17 0652 05/24/17 0538   Weight: 47.6 kg (105 lb) 48.7 kg (107 lb 5.8 oz) 51.4 kg (113 lb 4.8 oz)     Vital Signs with Ranges  Temp:  [97.3  F (36.3  C)-97.9  F (36.6  C)] 97.9  F (36.6  C)  Pulse:  [90] 90  Heart Rate:  [] 85  Resp:  [15-18] 16  BP: (139-160)/(73-88) 148/82  SpO2:  [88 %-95 %] 93 %  I/O last 3 completed shifts:  In: 360 [P.O.:360]  Out: -     Constitutional: Well-appearing, NAD  Respiratory: Clear to auscultation bilaterally, no wheezes, good air movement bilaterally  Cardiovascular: Irregularly irregular, normal rate  GI: Soft, non-tender, non-distended. BS normoactive.   Skin/Integumen: Warm, dry  Other:     Medications     Reason anticoagulant not prescribed for atrial fibrillation         budesonide  1 puff Inhalation BID     predniSONE  60 mg Oral Daily     amLODIPine (NORVASC) tablet 5 mg  5 mg Oral Daily     citalopram  40 mg Oral Daily     gabapentin  900 mg Oral At Bedtime     lisinopril (PRINIVIL/ZESTRIL) tablet 40 mg  40 mg Oral Daily     metoprolol  25 mg Oral Daily     montelukast  10 mg Oral At Bedtime     omeprazole  20 mg Oral BID AC     sertraline (ZOLOFT) tablet 50 mg  50 mg Oral Daily     sodium chloride (PF)  3 mL Intracatheter Q8H       Data     Recent Labs  Lab 05/24/17  0755 05/23/17  0755 05/22/17 2012 05/22/17  1450 05/22/17  1045   WBC  --  7.0  --   --  7.7   HGB  --  11.8  --   --  12.9   MCV  --  94  --   --  93   PLT  --  250  --   --  286    135  --   --  136   POTASSIUM 3.6 3.8  --   --  3.8   CHLORIDE 104 103  --   --  101   CO2 25 19*  --   --  22   BUN 26 26  --   --  23   CR 0.80 0.82  --   --  0.97   ANIONGAP 10 13  --   --  13   NANCY 8.6 8.8  --   --  9.3   * 158*  --   --  123*   TROPI  --   --  <0.015The 99th percentile for upper reference range is 0.045 ug/L.  Troponin values in the range of 0.045 - 0.120 ug/L may be associated with risks of  adverse clinical events. <0.015The 99th percentile for upper reference range is 0.045 ug/L.  Troponin values in the range of 0.045 - 0.120 ug/L may be associated with risks of adverse clinical events.  --        No results found for this or any previous visit (from the past 24 hour(s)).[IM1.1]     Revision History        User Key Date/Time User Provider Type Action    > IM1.4 5/24/2017 10:16 AM Ruben Mir MD Physician Sign     IM1.3 5/24/2017 10:11 AM Ruben Mir MD Physician      IM1.2 5/24/2017 10:09 AM Ruben Mir MD Physician      IM1.1 5/24/2017 10:06 AM Rbuen Mir MD Physician             ED Provider Notes by Tony Mcpherson MD at 5/22/2017 10:20 AM     Author:  Tony Mcpherson MD Service:  Emergency Medicine Author Type:  Physician    Filed:  5/24/2017  8:00 AM Date of Service:  5/22/2017 10:20 AM Creation Time:  5/22/2017 10:33 AM    Status:  Signed :  Tony Mcpherson MD (Physician)           History     Chief Complaint:  Shortness of Breath    HPI   Laya Lutz is a 89 year old female with history of asthma who presents by EMS to the emergency department today for evaluation of worsening shortness of breath and difficulty breathing that began this morning. She states that she has been wheezy as well. Patient also reports productive cough with phlegm and cold-like symptoms that have been persistent for the last three days. She has felt hot concurrently with symptoms but has not had a measured temperature. Symptoms feel somewhat similar to asthma and she notes that symptoms did somewhat improve with inhaler at home. Patient missed today's medications. EMS administered two Duonebs en route and had measured temperature of 99.0. EMS also notes that home inhalers were empty or almost gone. She denies lightheadedness, chest pain, palpitations, urinary symptoms, or bowel symptoms. No other concerns were voiced at this time.     Allergies:  Novocain  Fosamax  Penicillins  "     Medications:    LISINOPRIL PO  AMLODIPINE BESYLATE PO  GABAPENTIN PO  Alendronate Sodium (FOSAMAX PO)  SERTRALINE HCL PO  HYDROcodone-acetaminophen (NORCO)  MG per tablet  metoprolol (TOPROL-XL) 25 MG 24 hr tablet  albuterol (PROAIR HFA) 108 (90 BASE) MCG/ACT inhaler  omeprazole (PRILOSEC) 20 MG capsule  citalopram (CELEXA) 40 MG tablet  Multiple Vitamins-Minerals (CENTRUM SILVER) per tablet  montelukast (SINGULAIR) 10 MG tablet     Past Medical History:    Essential hypertension  Asthma  Depression   Spinal stenosis     Past Surgical History:   Cataract surgery   Appendectomy   Hysterectomy   Cholecystectomy   Lumbar discectomy      Family History:   No family history on file.     Social History:  Marital Status:   Smoking status: Never smoker  Alcohol use: Yes   Resident of independent living facility      Review of Systems   Respiratory: Positive for cough, shortness of breath and wheezing.    Cardiovascular: Negative for chest pain and palpitations.   Gastrointestinal: Negative for diarrhea.   Genitourinary: Negative for dysuria and hematuria.   Neurological: Negative for light-headedness.   All other systems reviewed and are negative.    Physical Exam   First Vitals:  BP: (!) 177/117  Pulse: 131  Heart Rate: 131  Temp: 99.3  F (37.4  C)  Resp: 22  Height: 147.3 cm (4' 10\")  Weight: 47.6 kg (105 lb)  SpO2: 92 %    Physical Exam[CF1.1]  SKIN:  Warm, dry.  HEMATOLOGIC/IMMUNOLOGIC/LYMPHATIC:  No pallor.  No limb edema.  HENT:  Moist oral mucosa.  Normal phonation.  No stridor.  No JVD.  EYES:  Conjunctivae normal.  CARDIOVASCULAR:  Tachycardic rate and irregularly irregular rhythm.  No murmur.  RESPIRATORY:  No respiratory distress, breath sounds equal and normal.  GASTROINTESTINAL:  Soft, nontender abdomen.  No mass or distension.  NEUROLOGIC:  Alert, conversant.  PSYCHIATRIC:  Normal mood.[JM1.1]    Emergency Department Course     ECG:  ECG taken at[CF1.1] 1113[CF1.2], ECG read at[CF1.1] " 1118  Sinus tachycardia  Left axis deviation  Inferior infarct, age undetermined  Abnormal ECG[CF1.2]  Rate[CF1.1] 120[CF1.2] bpm. SC interval[CF1.1] 154[CF1.2]. QRS duration[CF1.1] 72[CF1.2]. QT/QTc[CF1.1] 326/460[CF1.2]. P-R-T axes[CF1.1] 27 -41 54[CF1.2].    Imaging:  Radiology findings were communicated with the patient who voiced understanding of the findings.    Chest xray 2 views:[CF1.1]  Nothing acute. No interval change.[CF1.2]   Reading per radiology    Laboratory:  Laboratory findings were communicated with the patient who voiced understanding of the findings.  CBC: WBC[CF1.1] 7.7[CF1.3], HGB[CF1.1] 12.9[CF1.3], PLT[CF1.1] 286[CF1.3]   BMP:[CF1.1] Glucose: 123(H), GFR: 54(L),[CF1.2] Creatinine[CF1.1] 0.97[CF1.2]  UA:[CF1.1] Urine ketone: 10(A), Urine blood: Trace(A), Protein Albumin: 100(A), Leukocyte Esterase: Moderate(A), WBC: 10(H), Bacteria: Moderate(A)[CF1.4]    Blood culture: Pending    Interventions:[CF1.1]  1145 Albuterol 5 mg Nebulization[CF1.4]  1234 Tylenol 650 mg Oral[CF1.5]  1316 Solu-medrol 125 mg IV[CF1.6]    Emergency Department Course:  Nursing notes and vitals reviewed.  I performed an exam of the patient as documented above.   IV was inserted and blood was drawn for laboratory testing, results above.  The patient was sent for a chest xray while in the emergency department, results above.[CF1.1]     I discussed the treatment plan with the patient. They expressed understanding of this plan and consented to admission.     1300: I discussed the patient with Dr Prater, who will admit the patient to a monitored bed for further evaluation and treatment.[CF1.4]    I personally reviewed the treatment plan with the[CF1.1] Patient[CF1.2] and answered all related questions prior to admission[CF1.1].[CF1.2]    Impression & Plan      Medical Decision Making:[CF1.1]  Laya Lutz is a 89 year old female who presents with increasing work of breathing, tachy arrhythmia in addition to what sounds like  bronchitis, generalized weakness. Testing here was relatively unrevealing barring the atrial fibrillation with RVR which could be a function of her underlying illness, but no evidence of pneumonia. Does not seem clinically consistent with pulmonary embolism. She wanted to go home, but even with sitting her up in the bed she became nauseated, weak, and lightheaded so she will be admitted for further treatment and monitoring.[CF1.7]     Diagnosis:[CF1.1]    ICD-10-CM    1. Asthma exacerbation J45.901 Blood culture     Blood culture     Urine Culture Aerobic Bacterial   2. Atrial fibrillation with RVR (H) I48.91    3. General weakness R53.1    4. Essential hypertension I10[CF1.8]        Disposition:[CF1.1]   Admission under hospitalists service.[CF1.2]    Scribe Disclosure:  Milan JACKMAN am serving as a scribe at 10:34 AM on 5/22/2017 to document services personally performed by Tony Mcpherson MD, based on my observations and the provider's statements to me.   EMERGENCY DEPARTMENT[CF1.1]       Tony Mcpherson MD  05/24/17 0800  [JM1.2]     Revision History        User Key Date/Time User Provider Type Action    > JM1.2 5/24/2017  8:00 AM Tony Mcpherson MD Physician Sign     JM1.1 5/24/2017  7:59 AM Tony Mcpherson MD Physician      CF1.8 5/22/2017  2:53 PM Milan Xavieribe Share     CF1.7 5/22/2017  2:50 PM Milan Xavier      [N/A] 5/22/2017  1:55 PM Milan Xavier Scribe Share     CF1.6 5/22/2017  1:42 PM Milan Xavier Scribe Share     CF1.5 5/22/2017  1:09 PM Milan Xavieribe Share     CF1.4 5/22/2017  1:01 PM Milan Xavieribe Share     CF1.2 5/22/2017 11:23 AM Milan Xavieribe Share     CF1.3 5/22/2017 11:05 AM Milan Xavier Share     CF1.1 5/22/2017 10:53 AM Milan Xavier Share            Progress Notes by Ruben Mir MD at 5/23/2017  1:43 PM     Author:  Ruben Mir MD Service:  Hospitalist Author Type:  Physician    Filed:  5/23/2017  3:00  PM Date of Service:  5/23/2017  1:43 PM Creation Time:  5/23/2017  1:43 PM    Status:  Signed :  Ruben Mir MD (Physician)         [facility]    Hospitalist Progress Note    Date of Service (when I saw the patient): 05/23/2017    Assessment & Plan   Laya Lutz is a 89 year-old female with a past medical history of hypertension, atrial fibrillation, asthma, depression and spinal stenosis who was admitted on 5/22/2017 with dyspnea, generalized weakness.[IM1.1]     A[IM1.2]sthma exacerbation[IM1.1] secondary to acute bronchitis   Patient with recent URI-type symptoms, as well as several recent sick contacts with similar.   - Reports noticeable improvement since admission   - Transition to oral prednisone 60 mg daily[IM1.2] 5/24/17[IM1.3]  - Decrease scheduled albuterol to QID   - On antibiotics as below, suspect any respiratory infection likely viral in nature based on history[IM1.2]    Atrial fibrillation with rapid ventricular response  Suspect secondary to #1.[IM1.1] N[IM1.2]ot[IM1.1] on anticoagulation prior to admission, CHADS2-VASc at least 4.   - Improved rate control. Continue prior to admission metoprolol XL. Metoprolol IV available PRN for severe HR elevations    Possible urinary tract infection versus asymptomatic bacteruria   Urinalysis on admission mildly abnormal with 10 WBC, moderate LE. Negative nitrite. Patient notes change in stream strength over past 1-2 weeks, but no clear infectious symptoms. >100k GNR noted on preliminary culture results.  - Continue ciprofloxacin pending final culture results[IM1.2]     Physical deconditioning  Multifactorial[IM1.1] in setting of above  - PT/OT consulted[IM1.2]     Hypertension[IM1.1]  Blood pressure trend improving.   - Hold HCTZ[IM1.2].[IM1.1] Continue prior to admission lisinopril, metoprolol XL, amlodipine[IM1.2]    Depression  Anxiety  Continue prior to admit sertraline and citalopram.     N[IM1.1]on-anion gap metabolic  acidosis  Suspect secondary to IVF.   - Discontinue IV fluids  - Monitor BMP[IM1.2]    DVT Prophylaxis:[IM1.1] Pneumatic Compression Devices[IM1.2]  Code Status: DNR/DNI    Disposition: Expected discharge[IM1.1] 5/24/17[IM1.4] if respiratory status continues to improve on oral steroids.[IM1.2]     Ruben Mir    Interval History[IM1.1]   No acute events overnight. Reports she feels improved since admission, breathing currently feels about 75% of baseline. Denies any chest pain, n/v, abdominal pain.[IM1.2]     -Data reviewed today: I reviewed all new labs and imaging results over the last 24 hours. I personally reviewed no images or EKG's today.    Physical Exam   Temp: 98.1  F (36.7  C) Temp src: Oral BP: 166/89 Pulse: 98 Heart Rate: 107 Resp: 16 SpO2: 94 % O2 Device: None (Room air) Oxygen Delivery: 2 LPM  Vitals:    05/22/17 1024 05/23/17 0652   Weight: 47.6 kg (105 lb) 48.7 kg (107 lb 5.8 oz)     Vital Signs with Ranges  Temp:  [97.8  F (36.6  C)-98.5  F (36.9  C)] 98.1  F (36.7  C)  Pulse:  [] 98  Heart Rate:  [] 107  Resp:  [16-28] 16  BP: (151-188)/() 166/89  SpO2:  [90 %-95 %] 94 %  I/O last 3 completed shifts:  In: 1430 [P.O.:120; I.V.:1310]  Out: -     Constitutional: Well-appearing, NAD  Respiratory: Clear to auscultation bilaterally, good air movement bilaterally  Cardiovascular:[IM1.1] Irregularly irregular[IM1.2],[IM1.1] normal rate[IM1.2]  GI: Soft, non-tender, non-distended. BS normoactive.   Skin/Integumen: Warm, dry  Other:     Medications     Reason anticoagulant not prescribed for atrial fibrillation       NaCl 75 mL/hr at 05/23/17 0431       amLODIPine (NORVASC) tablet 5 mg  5 mg Oral Daily     citalopram  40 mg Oral Daily     gabapentin  900 mg Oral At Bedtime     lisinopril (PRINIVIL/ZESTRIL) tablet 40 mg  40 mg Oral Daily     metoprolol  25 mg Oral Daily     montelukast  10 mg Oral At Bedtime     omeprazole  20 mg Oral BID AC     sertraline (ZOLOFT) tablet 50 mg  50 mg  Oral Daily     sodium chloride (PF)  3 mL Intracatheter Q8H     albuterol  3 mL Nebulization Q4H     methylPREDNISolone  62.5 mg Intravenous Q12H     metoprolol  5 mg Intravenous Q6H     ciprofloxacin  400 mg Intravenous Daily       Data     Recent Labs  Lab 05/23/17  0755 05/22/17 2012 05/22/17  1450 05/22/17  1045   WBC 7.0  --   --  7.7   HGB 11.8  --   --  12.9   MCV 94  --   --  93     --   --  286     --   --  136   POTASSIUM 3.8  --   --  3.8   CHLORIDE 103  --   --  101   CO2 19*  --   --  22   BUN 26  --   --  23   CR 0.82  --   --  0.97   ANIONGAP 13  --   --  13   NANCY 8.8  --   --  9.3   *  --   --  123*   TROPI  --  <0.015The 99th percentile for upper reference range is 0.045 ug/L.  Troponin values in the range of 0.045 - 0.120 ug/L may be associated with risks of adverse clinical events. <0.015The 99th percentile for upper reference range is 0.045 ug/L.  Troponin values in the range of 0.045 - 0.120 ug/L may be associated with risks of adverse clinical events.  --        No results found for this or any previous visit (from the past 24 hour(s)).[IM1.1]       Revision History        User Key Date/Time User Provider Type Action    > IM1.3 5/23/2017  3:00 PM Ruben Mir MD Physician Sign     IM1.4 5/23/2017  2:45 PM Ruben Mir MD Physician      IM1.2 5/23/2017  2:39 PM Ruben Mir MD Physician      IM1.1 5/23/2017  1:43 PM Ruben Mir MD Physician             Progress Notes by Betsey Delgado PT at 5/23/2017  2:32 PM     Author:  Betsey Delgado PT Service:  (none) Author Type:  Physical Therapist    Filed:  5/23/2017  2:32 PM Date of Service:  5/23/2017  2:32 PM Creation Time:  5/23/2017  2:32 PM    Status:  Signed :  Betsey Delgado, PT (Physical Therapist)            05/23/17 1400   Quick Adds   Type of Visit Initial PT Evaluation   Living Environment   Lives With alone   Living Arrangements assisted living   Home Accessibility no concerns    Number of Stairs to Enter Home 0   Number of Stairs Within Home 0   Self-Care   Usual Activity Tolerance moderate   Current Activity Tolerance fair   Regular Exercise yes   Activity/Exercise Type walking   Equipment Currently Used at Home walker, rolling   Functional Level Prior   Ambulation 1-->assistive equipment   Transferring 1-->assistive equipment   Fall history within last six months no   Which of the above functional risks had a recent onset or change? none   General Information   Onset of Illness/Injury or Date of Surgery - Date 05/22/17   Referring Physician Mauro Prater MD   Patient/Family Goals Statement return home   Pertinent History of Current Problem (include personal factors and/or comorbidities that impact the POC) Admitted with weakness, dyspnea, asthma exacerbation, UTI and afib with RVR. PMH: asthma, HTN, depression, anxiety, afib.   Precautions/Limitations fall precautions   Weight-Bearing Status - LLE full weight-bearing   Weight-Bearing Status - RLE full weight-bearing   Cognitive Status Examination   Orientation orientation to person, place and time   Level of Consciousness alert   Follows Commands and Answers Questions 100% of the time;able to follow multistep instructions   Personal Safety and Judgment intact   Memory intact   Pain Assessment   Patient Currently in Pain No   Posture    Posture Protracted shoulders;Forward head position   Range of Motion (ROM)   ROM Quick Adds No deficits were identified   Strength   Strength Comments B hip flex 4/5, knee ext 4/5, DF 4/5   Bed Mobility   Bed Mobility Comments Supine to sit with SBA   Transfer Skills   Transfer Comments Sit to stand with SBA and FWW   Gait   Gait Comments Pt amb 10 ft with FWW and min A, dec in balance noted   Balance   Balance Comments Balance dec with gait   General Therapy Interventions   Planned Therapy Interventions balance training;gait training;neuromuscular re-education;strengthening;transfer training   Clinical  "Impression   Criteria for Skilled Therapeutic Intervention yes, treatment indicated   PT Diagnosis Difficulty ambulating   Influenced by the following impairments Dec strength, balance, activity tolerance, SOB   Functional limitations due to impairments Difficulty ambulating and transferring   Clinical Presentation Stable/Uncomplicated   Clinical Presentation Rationale medically stable   Clinical Decision Making (Complexity) Low complexity   Therapy Frequency` daily   Predicted Duration of Therapy Intervention (days/wks) 3 days   Anticipated Discharge Disposition Transitional Care Facility   Risk & Benefits of therapy have been explained Yes   Patient, Family & other staff in agreement with plan of care Yes   [facility] TM \"6 Clicks\"   2016, Trustees of [facility], under license to [employer].  All rights reserved.   6 Clicks Short Forms Basic Mobility Inpatient Short Form   [facility]  \"6 Clicks\" V.2 Basic Mobility Inpatient Short Form   1. Turning from your back to your side while in a flat bed without using bedrails? 4 - None   2. Moving from lying on your back to sitting on the side of a flat bed without using bedrails? 3 - A Little   3. Moving to and from a bed to a chair (including a wheelchair)? 3 - A Little   4. Standing up from a chair using your arms (e.g., wheelchair, or bedside chair)? 3 - A Little   5. To walk in hospital room? 3 - A Little   6. Climbing 3-5 steps with a railing? 2 - A Lot   Basic Mobility Raw Score (Score out of 24.Lower scores equate to lower levels of function) 18   Total Evaluation Time   Total Evaluation Time (Minutes) 15[EW1.1]        Revision History        User Key Date/Time User Provider Type Action    > EW1.1 5/23/2017  2:32 PM Betsey Delgado, PT Physical Therapist Sign            Progress Notes by Briana Lopez RT at 5/23/2017  1:44 PM     Author:  Briana Lopez RT Service:  (none) Author Type:  Respiratory Therapist    " Filed:  5/23/2017  1:45 PM Date of Service:  5/23/2017  1:44 PM Creation Time:  5/23/2017  1:44 PM    Status:  Signed :  Briana Lopez RT (Respiratory Therapist)         Pt on room air, has diminished BBS, and seems confused at times. Needs help with her IS, and only obtaining 100 on peak flow. Given nebs as scheduled, will continue to follow.[RN1.1]       Revision History        User Key Date/Time User Provider Type Action    > RN1.1 5/23/2017  1:45 PM Briana Lopez RT Respiratory Therapist Sign            Progress Notes by Rosie Bunch OT at 5/23/2017 12:34 PM     Author:  Rosie Bunch OT Service:  (none) Author Type:  Occupational Therapist    Filed:  5/23/2017 12:35 PM Date of Service:  5/23/2017 12:34 PM Creation Time:  5/23/2017 12:34 PM    Status:  Signed :  Rosie Bunch OT (Occupational Therapist)          05/23/17 1149   Quick Adds   Type of Visit Initial Occupational Therapy Evaluation   Living Environment   Lives With alone   Living Arrangements assisted living   Home Accessibility no concerns;grab bars present (bathtub);tub/shower is not walk in   Transportation Available family or friend will provide   Self-Care   Dominant Hand right   Usual Activity Tolerance moderate   Current Activity Tolerance fair   Regular Exercise no   Equipment Currently Used at Home walker, rolling;bath bench;grab bar   Functional Level Prior   Ambulation 1-->assistive equipment   Transferring 1-->assistive equipment   Toileting 1-->assistive equipment   Bathing 3-->assistive equipment and person   Dressing 0-->independent   Eating 0-->independent   Communication 0-->understands/communicates without difficulty   Swallowing 0-->swallows foods/liquids without difficulty   Prior Functional Level Comment Pt has HHA to A with bating 2x/wk, Pt makes own breakfast and goes to DR for lunch and dinner. Dtr sets up medbox, and family As with grocery shopping. for breakfast pt  has coffee cake, OJ, etc. Pts dtr does the cleaning and laundry and lives nearby.    General Information   Onset of Illness/Injury or Date of Surgery - Date 05/22/17   Referring Physician Mauro Prater MD   Patient/Family Goals Statement Appears open to rehab for a little while.    Additional Occupational Profile Info/Pertinent History of Current Problem The patient is a 89-year-old female with a past medical history of hypertension, atrial fibrillation, asthma, depression and spinal stenosis who presents with dyspnea, generalized weakness. Pt diagnosed with acute asthma exacerbation.    Precautions/Limitations fall precautions   Cognitive Status Examination   Orientation orientation to person, place and time   Level of Consciousness alert   Able to Follow Commands WNL/WFL   Memory impaired   Sensory Examination   Sensory Quick Adds No deficits were identified   Pain Assessment   Patient Currently in Pain No  (pain to touch of L lateral lower LE )   Range of Motion (ROM)   ROM Comment B UE AROM WFL    Strength   Strength Comments B UE MMT 4-/5, elbows 4/5   Bed Mobility Skill: Sit to Supine   Level of [place]: Sit/Supine minimum assist (75% patients effort)   Physical Assist/Nonphysical Assist: Sit/Supine 1 person assist;verbal cues   Transfer Skill: Bed to Chair/Chair to Bed   Level of [place]: Bed to Chair contact guard   Physical Assist/Nonphysical Assist: Bed to Chair 1 person assist;verbal cues   Assistive Device - Transfer Skill Bed to Chair Chair to Bed Rehab Eval standard walker   Transfer Skill: Sit to Stand   Level of [place]: Sit/Stand minimum assist (75% patients effort)   Physical Assist/Nonphysical Assist: Sit/Stand 1 person assist;verbal cues   Assistive Device for Transfer: Sit/Stand standard walker   Transfer Skill: Toilet Transfer   Level of [place]: Toilet minimum assist (75% patients effort)   Physical Assist/Nonphysical Assist: Toilet 1 person assist;verbal cues  "  Assistive Device standard walker;grab bars   Lower Body Dressing   Level of [place]: Dress Lower Body contact guard   Physical Assist/Nonphysical Assist: Dress Lower Body 1 person assist;verbal cues   Toileting   Level of [place]: Toilet stand-by assist   Physical Assist/Nonphysical Assist: Toilet 1 person assist;verbal cues   Grooming   Level of [place]: Grooming stand-by assist   Physical Assist/Nonphysical Assist: Grooming 1 person assist;verbal cues   Eating/Self Feeding   Level of [place]: Eating independent   Instrumental Activities of Daily Living (IADL)   Previous Responsibilities meal prep  (dtr sets up meds in med box)   Activities of Daily Living Analysis   Impairments Contributing to Impaired Activities of Daily Living balance impaired;cognition impaired;pain;strength decreased  (decreased activity tolerance)   General Therapy Interventions   Planned Therapy Interventions ADL retraining;cognition;transfer training   Clinical Impression   Criteria for Skilled Therapeutic Interventions Met yes, treatment indicated   OT Diagnosis decreased ADL's and functionalmobility   Influenced by the following impairments impaired balance, safety, decreased activity tolerance, overall strength, L LE lower latera pain   Assessment of Occupational Performance 1-3 Performance Deficits   Identified Performance Deficits impaired independence with basic ADls' dressing, toielt transfer etc.   Clinical Decision Making (Complexity) Moderate complexity   Therapy Frequency daily   Predicted Duration of Therapy Intervention (days/wks) 3 days   Anticipated Discharge Disposition Transitional Care Facility;Home with Assist;Home with Home Therapy  (TCU, pending progress home OT )   Risks and Benefits of Treatment have been explained. Yes   Patient, Family & other staff in agreement with plan of care Yes   [facility] AM-PAC  \"6 Clicks\" Daily Activity Inpatient Short Form   1. Putting on and taking off " regular lower body clothing? 3 - A Little   2. Bathing (including washing, rinsing, drying)? 3 - A Little   3. Toileting, which includes using toilet, bedpan or urinal? 3 - A Little   4. Putting on and taking off regular upper body clothing? 3 - A Little   5. Taking care of personal grooming such as brushing teeth? 3 - A Little   6. Eating meals? 4 - None   Daily Activity Raw Score (Score out of 24.Lower scores equate to lower levels of function) 19   Total Evaluation Time   Total Evaluation Time (Minutes) 10[JG1.1]        Revision History        User Key Date/Time User Provider Type Action    > JG1.1 5/23/2017 12:35 PM Rosie Bunch, OT Occupational Therapist Sign            Progress Notes by Aisha Villatoro at 5/23/2017 11:54 AM     Author:  Aisha Villtaoro Service:  Spiritual Health Author Type:      Filed:  5/23/2017 11:56 AM Date of Service:  5/23/2017 11:54 AM Creation Time:  5/23/2017 11:54 AM    Status:  Signed :  Aisha Villatoro ()         SPIRITUAL HEALTH SERVICES  Progress Note  [facility]    Visit per pt request.  I have met this pt before.  She seemed a little confused today.  Her main worry is that her daughter is mad at her for coming to the hospital.  I suggested that maybe daughter is worried and not mad.  Pt burst into tears and said she hadn't thought of that before.  That seemed to calm her fears.  OT came so we ended the visit with a short blessing.  No need to follow.      Aisha Villatoro  Chaplain Resident  Pager [phone][HM1.1]     Revision History        User Key Date/Time User Provider Type Action    > HM1.1 5/23/2017 11:56 AM Aisha Villatoro Sign            Progress Notes by Luz Galo RT at 5/23/2017  3:48 AM     Author:  Luz Galo RT Service:  (none) Author Type:  Respiratory Therapist    Filed:  5/23/2017  3:50 AM Date of Service:  5/23/2017  3:48 AM Creation Time:  5/23/2017  3:48 AM    Status:  Signed :  Luz Galo RT (Respiratory  Therapist)         Pt on 2LPM NC with SpO2 in the low 90's. BBS diminished, neb TX given as schedule. Will continue to follow.[HA1.1]  5/23/2017  Luz Galo[HA1.2]       Revision History        User Key Date/Time User Provider Type Action    > HA1.2 5/23/2017  3:50 AM Luz Galo, RT Respiratory Therapist Sign     HA1.1 5/23/2017  3:48 AM Luz Galo, RT Respiratory Therapist             ED Notes by Arianne Campo, RN at 5/22/2017 12:15 PM     Author:  Arianne Campo, RN Service:  Emergency Medicine Author Type:  Registered Nurse    Filed:  5/22/2017  1:20 PM Date of Service:  5/22/2017 12:15 PM Creation Time:  5/22/2017 12:15 PM    Status:  Signed :  Arianne Campo, RN (Registered Nurse)         [facility]  ED Nurse Handoff Report    ED Chief complaint: Shortness of Breath (difficulty breathing this AM, wheezy, home inhalers out/almost gone)      ED Diagnosis:   Final diagnoses:   None       Code Status: DNR / DNI[LD1.1] per NH paperwork review. MD to verify[LD1.2]    Allergies:   Allergies   Allergen Reactions     Fish Allergy      Throat swelled, can tolerate shrimp,crab     Novocain [Procaine Hcl] Anaphylaxis     Face swelled difficulty breathing     Shellfish-Derived Products Shortness Of Breath     Fosamax [Alendronate Sodium]      Penicillins      Unsure of reaction       Activity level - Baseline/Home:  Independent[LD1.1] with walker[LD1.3]    Activity Level - Current:   Stand with Assist[LD1.1] with walker[LD1.3]     Needed?: No    Isolation: No  Infection: Not Applicable    Bariatric?: No    Vital Signs:   Vitals:    05/22/17 1111 05/22/17 1130 05/22/17 1200 05/22/17 1212   BP:  (!) 162/107 (!) 149/100    Pulse:       Resp: 21 19 19 20   Temp:   98.9  F (37.2  C)    TempSrc:   Oral    SpO2: 94% 95% 95% 92%   Weight:       Height:           Cardiac Rhythm: ,   Cardiac  Cardiac Rhythm: Atrial fibrillation    Pain level: 0-10 Pain Scale: 0    Is this patient confused?:  "No    Patient Report: Initial Complaint: lives in senior living but responsible for own meds. Has had a \"cold\" for 3 days but this morning felt very SOB despite trying PTA inhalers. Patient did not take meds last night or this AM due to feeling poorly. Staff called EMS.  Focused Assessment: HTN, tachy (known a.fib), LS wheezes, tachypnea/SOB, low grade temp[LD1.1]. Failed activity attempt (see note)  Tests Per[LD1.4]formed: EKG, labs, UA, CXR, blood cultures  Abnormal Results: nothing significant,[LD1.1] mild + UA,[LD1.3] see results  Treatments provided: supplemental o2 for mild hypoxia (91%), albuterol neb,[LD1.1] tylenol[LD1.4], solu-medrol[LD1.5].[LD1.4] monitors    Family Comments: NH staff LM for her daughter and granddaughter[LD1.1] per EMS report, have not arrived here yet[LD1.4]    OBS brochure/video discussed/provided to patient:[LD1.1] N/A[LD1.6]    ED Medications:  Medications   sodium chloride (PF) 0.9% PF flush 3 mL (not administered)   sodium chloride (PF) 0.9% PF flush 3 mL ( Intracatheter Canceled Entry 5/22/17 1146)   albuterol neb solution 5 mg (5 mg Nebulization Given 5/22/17 1145)       Drips infusing?:  No      ED NURSE PHONE NUMBER: [phone][LD1.1]            Revision History        User Key Date/Time User Provider Type Action    > LD1.6 5/22/2017  1:20 PM Arianne Campo, RN Registered Nurse Sign     LD1.5 5/22/2017  1:10 PM Arianne Campo, RN Registered Nurse      LD1.3 5/22/2017  1:04 PM Arianne Campo, RN Registered Nurse      LD1.4 5/22/2017 12:28 PM Arianne Campo, RN Registered Nurse      LD1.2 5/22/2017 12:19 PM Arianne Campo, RN Registered Nurse      LD1.1 5/22/2017 12:15 PM Arianne Campo, RN Registered Nurse             ED Notes by Arianne Campo, RN at 5/22/2017 12:27 PM     Author:  Arianne Campo RN Service:  Emergency Medicine Author Type:  Registered Nurse    Filed:  5/22/2017 12:28 PM Date of Service:  5/22/2017 12:27 PM Creation Time:  5/22/2017 12:27 PM    " Status:  Signed :  Arianne Campo RN (Registered Nurse)         Tried to get patient to chair. O2 sats dipped to 89%, patient felt nauseous, flushed, weak. Feet were slipping out from under her legs. HR jumped up to 145 bpm. Dr Mcpherson updated.[LD1.1]     Revision History        User Key Date/Time User Provider Type Action    > LD1.1 5/22/2017 12:28 PM Arianne Campo RN Registered Nurse Sign            ED Notes by Arianne Campo RN at 5/22/2017 12:01 PM     Author:  Arianne Campo RN Service:  Emergency Medicine Author Type:  Registered Nurse    Filed:  5/22/2017 12:01 PM Date of Service:  5/22/2017 12:01 PM Creation Time:  5/22/2017 12:01 PM    Status:  Signed :  Arianne Campo RN (Registered Nurse)         Supplemental o2 turned off after albuterol neb to assess oxygenation. At baseline patient does not wear home o2.[LD1.1]     Revision History        User Key Date/Time User Provider Type Action    > LD1.1 5/22/2017 12:01 PM Arianne Campo RN Registered Nurse Sign            ED Notes signed by Sajan, Non-Provider at 5/22/2017 11:59 AM      Author:  Sajan, Non-Provider Service:  (none) Author Type:  (none)    Filed:  5/22/2017 11:59 AM Date of Service:  5/22/2017 11:59 AM Creation Time:  5/22/2017 11:59 AM    Status:  Signed :  Scan, Non-Provider     Scan on 5/22/2017 11:59 AM by Scan, Non-Provider : [facility] MEDICAL TRANSPORTATION 1          Revision History        User Key Date/Time User Provider Type Action    > [N/A] 5/22/2017 11:59 AM Scan, Non-Provider (none) Sign            ED Notes by Phil Casanova RN at 5/22/2017 10:21 AM     Author:  Phil Casanova RN Service:  (none) Author Type:  Registered Nurse    Filed:  5/22/2017 10:21 AM Date of Service:  5/22/2017 10:21 AM Creation Time:  5/22/2017 10:21 AM    Status:  Signed :  Phil Casanova RN (Registered Nurse)         Bed: ED21  Expected date: 5/22/17  Expected time: 10:15 AM  Means of arrival: Ambulance  Comments:  516 89f Asthma     Revision  History        User Key Date/Time User Provider Type Action    > JN1.1 5/22/2017 10:21 AM Phil Casanova, RN Registered Nurse Sign                  Procedure Notes     No notes of this type exist for this encounter.         Progress Notes - Therapies (Notes from 05/22/17 through 05/25/17)      Progress Notes by Betsey Delgado PT at 5/23/2017  2:32 PM     Author:  Betsey Delgado PT Service:  (none) Author Type:  Physical Therapist    Filed:  5/23/2017  2:32 PM Date of Service:  5/23/2017  2:32 PM Creation Time:  5/23/2017  2:32 PM    Status:  Signed :  Betsey Delgado PT (Physical Therapist)            05/23/17 1400   Quick Adds   Type of Visit Initial PT Evaluation   Living Environment   Lives With alone   Living Arrangements assisted living   Home Accessibility no concerns   Number of Stairs to Enter Home 0   Number of Stairs Within Home 0   Self-Care   Usual Activity Tolerance moderate   Current Activity Tolerance fair   Regular Exercise yes   Activity/Exercise Type walking   Equipment Currently Used at Home walker, rolling   Functional Level Prior   Ambulation 1-->assistive equipment   Transferring 1-->assistive equipment   Fall history within last six months no   Which of the above functional risks had a recent onset or change? none   General Information   Onset of Illness/Injury or Date of Surgery - Date 05/22/17   Referring Physician Mauro Prater MD   Patient/Family Goals Statement return home   Pertinent History of Current Problem (include personal factors and/or comorbidities that impact the POC) Admitted with weakness, dyspnea, asthma exacerbation, UTI and afib with RVR. PMH: asthma, HTN, depression, anxiety, afib.   Precautions/Limitations fall precautions   Weight-Bearing Status - LLE full weight-bearing   Weight-Bearing Status - RLE full weight-bearing   Cognitive Status Examination   Orientation orientation to person, place and time   Level of Consciousness alert   Follows Commands and  "Answers Questions 100% of the time;able to follow multistep instructions   Personal Safety and Judgment intact   Memory intact   Pain Assessment   Patient Currently in Pain No   Posture    Posture Protracted shoulders;Forward head position   Range of Motion (ROM)   ROM Quick Adds No deficits were identified   Strength   Strength Comments B hip flex 4/5, knee ext 4/5, DF 4/5   Bed Mobility   Bed Mobility Comments Supine to sit with SBA   Transfer Skills   Transfer Comments Sit to stand with SBA and FWW   Gait   Gait Comments Pt amb 10 ft with FWW and min A, dec in balance noted   Balance   Balance Comments Balance dec with gait   General Therapy Interventions   Planned Therapy Interventions balance training;gait training;neuromuscular re-education;strengthening;transfer training   Clinical Impression   Criteria for Skilled Therapeutic Intervention yes, treatment indicated   PT Diagnosis Difficulty ambulating   Influenced by the following impairments Dec strength, balance, activity tolerance, SOB   Functional limitations due to impairments Difficulty ambulating and transferring   Clinical Presentation Stable/Uncomplicated   Clinical Presentation Rationale medically stable   Clinical Decision Making (Complexity) Low complexity   Therapy Frequency` daily   Predicted Duration of Therapy Intervention (days/wks) 3 days   Anticipated Discharge Disposition Transitional Care Facility   Risk & Benefits of therapy have been explained Yes   Patient, Family & other staff in agreement with plan of care Yes   [facility] TM \"6 Clicks\"   2016, Trustees of [facility], under license to [employer].  All rights reserved.   6 Clicks Short Forms Basic Mobility Inpatient Short Form   [facility] [employer]  \"6 Clicks\" V.2 Basic Mobility Inpatient Short Form   1. Turning from your back to your side while in a flat bed without using bedrails? 4 - None   2. Moving from lying on your back to sitting on the side of a " flat bed without using bedrails? 3 - A Little   3. Moving to and from a bed to a chair (including a wheelchair)? 3 - A Little   4. Standing up from a chair using your arms (e.g., wheelchair, or bedside chair)? 3 - A Little   5. To walk in hospital room? 3 - A Little   6. Climbing 3-5 steps with a railing? 2 - A Lot   Basic Mobility Raw Score (Score out of 24.Lower scores equate to lower levels of function) 18   Total Evaluation Time   Total Evaluation Time (Minutes) 15[EW1.1]        Revision History        User Key Date/Time User Provider Type Action    > EW1.1 5/23/2017  2:32 PM Betsey Delgado, PT Physical Therapist Sign            Progress Notes by Briana Lopez RT at 5/23/2017  1:44 PM     Author:  Briana Lopez RT Service:  (none) Author Type:  Respiratory Therapist    Filed:  5/23/2017  1:45 PM Date of Service:  5/23/2017  1:44 PM Creation Time:  5/23/2017  1:44 PM    Status:  Signed :  Briana Lopez RT (Respiratory Therapist)         Pt on room air, has diminished BBS, and seems confused at times. Needs help with her IS, and only obtaining 100 on peak flow. Given nebs as scheduled, will continue to follow.[RN1.1]       Revision History        User Key Date/Time User Provider Type Action    > RN1.1 5/23/2017  1:45 PM Briana Lopez RT Respiratory Therapist Sign            Progress Notes by Rosie Bunch OT at 5/23/2017 12:34 PM     Author:  Rosie Bunch OT Service:  (none) Author Type:  Occupational Therapist    Filed:  5/23/2017 12:35 PM Date of Service:  5/23/2017 12:34 PM Creation Time:  5/23/2017 12:34 PM    Status:  Signed :  Rosie Bunch OT (Occupational Therapist)          05/23/17 1149   Quick Adds   Type of Visit Initial Occupational Therapy Evaluation   Living Environment   Lives With alone   Living Arrangements assisted living   Home Accessibility no concerns;grab bars present (bathtub);tub/shower is not walk in   Transportation  Available family or friend will provide   Self-Care   Dominant Hand right   Usual Activity Tolerance moderate   Current Activity Tolerance fair   Regular Exercise no   Equipment Currently Used at Home walker, rolling;bath bench;grab bar   Functional Level Prior   Ambulation 1-->assistive equipment   Transferring 1-->assistive equipment   Toileting 1-->assistive equipment   Bathing 3-->assistive equipment and person   Dressing 0-->independent   Eating 0-->independent   Communication 0-->understands/communicates without difficulty   Swallowing 0-->swallows foods/liquids without difficulty   Prior Functional Level Comment Pt has HHA to A with bating 2x/wk, Pt makes own breakfast and goes to DR for lunch and dinner. Dtr sets up medbox, and family As with grocery shopping. for breakfast pt has coffee cake, OJ, etc. Pts dtr does the cleaning and laundry and lives nearby.    General Information   Onset of Illness/Injury or Date of Surgery - Date 05/22/17   Referring Physician Mauro Prater MD   Patient/Family Goals Statement Appears open to rehab for a little while.    Additional Occupational Profile Info/Pertinent History of Current Problem The patient is a 89-year-old female with a past medical history of hypertension, atrial fibrillation, asthma, depression and spinal stenosis who presents with dyspnea, generalized weakness. Pt diagnosed with acute asthma exacerbation.    Precautions/Limitations fall precautions   Cognitive Status Examination   Orientation orientation to person, place and time   Level of Consciousness alert   Able to Follow Commands WNL/WFL   Memory impaired   Sensory Examination   Sensory Quick Adds No deficits were identified   Pain Assessment   Patient Currently in Pain No  (pain to touch of L lateral lower LE )   Range of Motion (ROM)   ROM Comment B UE AROM WFL    Strength   Strength Comments B UE MMT 4-/5, elbows 4/5   Bed Mobility Skill: Sit to Supine   Level of [place]: Sit/Supine  minimum assist (75% patients effort)   Physical Assist/Nonphysical Assist: Sit/Supine 1 person assist;verbal cues   Transfer Skill: Bed to Chair/Chair to Bed   Level of [place]: Bed to Chair contact guard   Physical Assist/Nonphysical Assist: Bed to Chair 1 person assist;verbal cues   Assistive Device - Transfer Skill Bed to Chair Chair to Bed Rehab Eval standard walker   Transfer Skill: Sit to Stand   Level of [place]: Sit/Stand minimum assist (75% patients effort)   Physical Assist/Nonphysical Assist: Sit/Stand 1 person assist;verbal cues   Assistive Device for Transfer: Sit/Stand standard walker   Transfer Skill: Toilet Transfer   Level of [place]: Toilet minimum assist (75% patients effort)   Physical Assist/Nonphysical Assist: Toilet 1 person assist;verbal cues   Assistive Device standard walker;grab bars   Lower Body Dressing   Level of [place]: Dress Lower Body contact guard   Physical Assist/Nonphysical Assist: Dress Lower Body 1 person assist;verbal cues   Toileting   Level of [place]: Toilet stand-by assist   Physical Assist/Nonphysical Assist: Toilet 1 person assist;verbal cues   Grooming   Level of [place]: Grooming stand-by assist   Physical Assist/Nonphysical Assist: Grooming 1 person assist;verbal cues   Eating/Self Feeding   Level of [place]: Eating independent   Instrumental Activities of Daily Living (IADL)   Previous Responsibilities meal prep  (dtr sets up meds in med box)   Activities of Daily Living Analysis   Impairments Contributing to Impaired Activities of Daily Living balance impaired;cognition impaired;pain;strength decreased  (decreased activity tolerance)   General Therapy Interventions   Planned Therapy Interventions ADL retraining;cognition;transfer training   Clinical Impression   Criteria for Skilled Therapeutic Interventions Met yes, treatment indicated   OT Diagnosis decreased ADL's and functionalmobility   Influenced by the following impairments  "impaired balance, safety, decreased activity tolerance, overall strength, L LE lower latera pain   Assessment of Occupational Performance 1-3 Performance Deficits   Identified Performance Deficits impaired independence with basic ADls' dressing, toielt transfer etc.   Clinical Decision Making (Complexity) Moderate complexity   Therapy Frequency daily   Predicted Duration of Therapy Intervention (days/wks) 3 days   Anticipated Discharge Disposition Transitional Care Facility;Home with Assist;Home with Home Therapy  (TCU, pending progress home OT )   Risks and Benefits of Treatment have been explained. Yes   Patient, Family & other staff in agreement with plan of care Yes   [facility] AM-PAC  \"6 Clicks\" Daily Activity Inpatient Short Form   1. Putting on and taking off regular lower body clothing? 3 - A Little   2. Bathing (including washing, rinsing, drying)? 3 - A Little   3. Toileting, which includes using toilet, bedpan or urinal? 3 - A Little   4. Putting on and taking off regular upper body clothing? 3 - A Little   5. Taking care of personal grooming such as brushing teeth? 3 - A Little   6. Eating meals? 4 - None   Daily Activity Raw Score (Score out of 24.Lower scores equate to lower levels of function) 19   Total Evaluation Time   Total Evaluation Time (Minutes) 10[JG1.1]        Revision History        User Key Date/Time User Provider Type Action    > JG1.1 5/23/2017 12:35 PM Rosie Bunch OT Occupational Therapist Sign            Progress Notes by Luz Galo RT at 5/23/2017  3:48 AM     Author:  Luz Galo RT Service:  (none) Author Type:  Respiratory Therapist    Filed:  5/23/2017  3:50 AM Date of Service:  5/23/2017  3:48 AM Creation Time:  5/23/2017  3:48 AM    Status:  Signed :  Luz Galo RT (Respiratory Therapist)         Pt on 2LPM NC with SpO2 in the low 90's. BBS diminished, neb TX given as schedule. Will continue to follow.[HA1.1]  5/23/2017  Luz" Iraj[HA1.2]       Revision History        User Key Date/Time User Provider Type Action    > HA1.2 5/23/2017  3:50 AM Luz Galo, RT Respiratory Therapist Sign     HA1.1 5/23/2017  3:48 AM Luz Galo, RT Respiratory Therapist

## 2017-05-22 NOTE — IP AVS SNAPSHOT
` ` Patient Information     Patient Name Sex     Laya Lutz ([id number]) Female 3/13/1926       Room Bed    28 6628-01      Patient Demographics     Address Phone    [address]   [facility] [address] [phone] (Home)  none (Work)  none (Mobile)      Patient Ethnicity & Race     Ethnic Group Patient Race    American White      Emergency Contact(s)     Name Relation Home Work Mobile    EUN DOS SANTOS Daughter [phone] none [phone]    Lia Lutz  Daughter [phone] none [phone]      Documents on File        Status Date Received Description       Documents for the Patient    Consent for Services - [facility] Received () 11     Privacy Notice - [facility] Received 11     Insurance Card Received 11     Patient ID Received 12     Insurance Card Received 11 BCBS    Privacy Notice - [place] Received 12     External Medication Information Consent       Consent for Services - Hospital/Clinic  ()      Insurance Card Received 12     Advance Directives and Living Will Not Received  POLST 12    Consent for Services - Hospital/Clinic Received () 12     Physical Therapy Certification Received 12     Occupational Therapy Certification Received 12/10/12     Consent for EHR Access  13 Copied from existing Consent for services - IP/ED collected on 2012    [facility] Specified Other       Consent for Services - Hospital/Clinic Received () 13     Patient ID Received 03/15/14     Insurance Card Received 03/15/14     Consent for Services - Hospital/Clinic Received () 10/08/14     Business/Insurance/Care Coordination/Health Form - Patient Received 03/30/15 bcbs cob    Occupational Therapy Certification Sent 03/31/15     Consent for Services/Privacy Notice - Hospital/Clinic Received () 03/15/16     Consent for Services/Privacy Notice - Hospital/Clinic Received 17     Insurance Card  Received (Deleted) 05/20/12        Documents for the Encounter    CMS IM for Patient Signature       EMS/Ambulance Record  05/22/17 [facility]      Admission Information     Attending Provider Admitting Provider Admission Type Admission Date/Time    Mauro Prater MD Kane, Justin Kieran, MD Emergency 05/22/17  1021    Discharge Date Hospital Service Auth/Cert Status Service Area     Hospitalist Incomplete [facility]    Unit Room/Bed Admission Status        [facility] UNIT 6628/6628-01 Admission (Confirmed)       Admission     Complaint    Asthma exacerbation      Hospital Account     Name Acct ID Class Status Primary Coverage    Laya Lutz [id number] Inpatient Open MEDICARE - MEDICARE            Guarantor Account (for Hospital Account #[id number])     Name Relation to Pt Service Area Active? Acct Type    Laya Lutz Self FCS Yes Personal/Family    Address Phone          [address] [place] [address] [phone](H)  none(O)              Coverage Information (for Hospital Account #[id number])     1. MEDICARE/MEDICARE     F/O Payor/Plan Precert #    MEDICARE/MEDICARE     Subscriber Subscriber #    Laya Lutz [id number]    Address Phone    [employer]  [address]  [place] [address] [phone]          2. [place]     F/O Payor/Plan Precert #    [place]     Subscriber Subscriber #    Laya Lutz [id number]    Address Phone    [address] [address]  [place], [place] [id number] [phone]

## 2017-05-22 NOTE — IP AVS SNAPSHOT
"[place] [address] [facility]: [phone]                                              INTERAGENCY TRANSFER FORM - PHYSICIAN ORDERS   2017                    Hospital Admission Date: 2017  АННА GUZMAN   : 3/13/1926  Sex: Female        Attending Provider: Mauro Prater MD     Allergies:  Fish Allergy, Novocain [Procaine Hcl], Shellfish-derived Products, Fosamax [Alendronate Sodium], Penicillins    Infection:  None   Service:  HOSPITALIST    Ht:  1.473 m (4' 10\")   Wt:  49.2 kg (108 lb 7.5 oz)   Admission Wt:  47.6 kg (105 lb)    BMI:  22.67 kg/m 2   BSA:  1.42 m 2            Patient PCP Information     Provider PCP Type    Dayton Huston MD General      ED Clinical Impression     Diagnosis Description Comment Added By Time Added    Asthma exacerbation [J45.901] Asthma exacerbation [J45.901]  Tony Mcpherson MD 2017 12:44 PM    Atrial fibrillation with RVR (H) [I48.91] Atrial fibrillation with RVR (H) [I48.91]  Tony Mcpherson MD 2017 12:44 PM    General weakness [R53.1] General weakness [R53.1]  Tony Mcpherson MD 2017 12:44 PM    Essential hypertension [I10] Essential hypertension [I10]  Tony Mcpherson MD 2017 12:45 PM      Hospital Problems as of 2017              Priority Class Noted POA    Asthma exacerbation Medium  2017 Yes      Non-Hospital Problems as of 2017              Priority Class Noted    Diarrhea   3/2/2012    Tachycardia   3/2/2012    Essential hypertension   Unknown    Depression   Unknown    Left leg pain   2012    Radicular leg pain   2012    Confusional state   2012    UTI (urinary tract infection)   2012    Physical deconditioning   2012    Stress-induced cardiomyopathy   2012    Asthma   2012    Hand pain, left   2012    Spinal stenosis of lumbar region   2012    Aftercare following surgery   2012    Lumbar disc disease with radiculopathy   " 9/18/2012    Hypertension   9/20/2013    Hypertensive urgency   3/30/2015    Encephalopathy acute Medium  4/2/2016    Generalized weakness Medium  12/28/2016    Aspiration of food, initial encounter Medium  12/29/2016      Code Status History     Date Active Date Inactive Code Status Order ID Comments User Context    1/2/2017 12:43 PM 5/22/2017  2:10 PM DNR/DNI [id number]  Tamiko Zamudio MD Outpatient    12/28/2016  5:02 PM 1/2/2017 12:43 PM DNR/DNI [id number]  Barthell, Joanna Kersten Ulmen, PA-C ED    4/4/2016  2:58 PM 12/28/2016  5:02 PM DNR/DNI [id number]  Katy Martinez PA-C Outpatient    4/1/2015 12:20 PM 4/4/2016  2:58 PM DNR/DNI [id number]  James Rubin MD Outpatient    3/30/2015 11:30 AM 4/1/2015 12:20 PM DNR/DNI [id number]  James Rubin MD Inpatient    9/22/2013  1:44 PM 3/30/2015 11:30 AM DNR/DNI [id number]  Catalina Gomez MD Outpatient    9/20/2013  8:04 PM 9/22/2013  1:44 PM DNR/DNI [id number]  Yulia Olivier, RN Inpatient    7/31/2012  3:25 PM 8/5/2012  4:21 PM Full Code [id number]  Freida Alarcon RN Inpatient    5/22/2012  8:58 AM 7/31/2012  3:25 PM Full Code [id number]  Dayton Huston MD Outpatient    5/20/2012  6:51 PM 5/22/2012  8:58 AM Full Code [id number]  Dayton Huston MD Inpatient         Medication Review      UNREVIEWED medications. Ask your doctor about these medications        Dose / Directions Comments    acetaminophen 325 MG tablet   Commonly known as:  TYLENOL   Used for:  Lumbar disc disease with radiculopathy        Dose:  650 mg   Take 2 tablets (650 mg) by mouth every 4 hours as needed for mild pain   Quantity:  100 tablet   Refills:  0        albuterol 108 (90 BASE) MCG/ACT Inhaler   Used for:  Intermittent asthma   Generic drug:  albuterol        INHALE 2 PUFFS BY MOUTH EVERY 4 TO 6 HOURS AS NEEDED FOR WHEEZE   Quantity:  2 Inhaler   Refills:  4        AMLODIPINE BESYLATE PO        Dose:  5 mg   Take 5 mg by mouth daily    Refills:  0        CENTRUM SILVER per tablet        Dose:  1 tablet   Take 1 tablet by mouth daily.   Refills:  0        citalopram 40 MG tablet   Commonly known as:  celeXA   Used for:  Depressive disorder, not elsewhere classified        TAKE ONE TABLET BY MOUTH DAILY   Quantity:  90 tablet   Refills:  0        FOSAMAX PO        Dose:  70 mg   Take 70 mg by mouth once a week   Refills:  0        GABAPENTIN PO        Dose:  900 mg   Take 900 mg by mouth At Bedtime (takes 3 x 300 mg caps)   Refills:  0        HYDROcodone-acetaminophen 5-325 MG per tablet   Commonly known as:  NORCO        Dose:  1 tablet   Take 1 tablet by mouth 2 times daily as needed   Refills:  0        LISINOPRIL PO        Dose:  40 mg   Take 40 mg by mouth daily   Refills:  0        metoprolol 25 MG 24 hr tablet   Commonly known as:  TOPROL-XL   Used for:  Elevated troponin        Dose:  25 mg   Take 1 tablet by mouth daily.   Quantity:  90 tablet   Refills:  0        omeprazole 20 MG CR capsule   Commonly known as:  priLOSEC   Used for:  GERD (gastroesophageal reflux disease)        TAKE ONE CAPSULE BY MOUTH DAILY 30 MINUTES BEFORE BREAKFAST   Quantity:  30 capsule   Refills:  0        SERTRALINE HCL PO        Dose:  50 mg   Take 50 mg by mouth daily   Refills:  0        SINGULAIR 10 MG tablet   Generic drug:  montelukast        Dose:  10 mg   Take 10 mg by mouth At Bedtime.   Refills:  0          CONTINUE these medications which have NOT CHANGED        Dose / Directions Comments    order for DME   Used for:  Closed fracture of multiple ribs of right side, initial encounter, Rib pain on right side        Equipment being ordered: rib belt   Quantity:  1 Device   Refills:  0

## 2017-05-22 NOTE — ED NOTES
"[facility]  ED Nurse Handoff Report    ED Chief complaint: Shortness of Breath (difficulty breathing this AM, wheezy, home inhalers out/almost gone)      ED Diagnosis:   Final diagnoses:   None       Code Status: DNR / DNI per NH paperwork review. MD to verify    Allergies:   Allergies   Allergen Reactions     Fish Allergy      Throat swelled, can tolerate shrimp,crab     Novocain [Procaine Hcl] Anaphylaxis     Face swelled difficulty breathing     Shellfish-Derived Products Shortness Of Breath     Fosamax [Alendronate Sodium]      Penicillins      Unsure of reaction       Activity level - Baseline/Home:  Independent with walker    Activity Level - Current:   Stand with Assist with walker     Needed?: No    Isolation: No  Infection: Not Applicable    Bariatric?: No    Vital Signs:   Vitals:    05/22/17 1111 05/22/17 1130 05/22/17 1200 05/22/17 1212   BP:  (!) 162/107 (!) 149/100    Pulse:       Resp: 21 19 19 20   Temp:   98.9  F (37.2  C)    TempSrc:   Oral    SpO2: 94% 95% 95% 92%   Weight:       Height:           Cardiac Rhythm: ,   Cardiac  Cardiac Rhythm: Atrial fibrillation    Pain level: 0-10 Pain Scale: 0    Is this patient confused?: No    Patient Report: Initial Complaint: lives in senior living but responsible for own meds. Has had a \"cold\" for 3 days but this morning felt very SOB despite trying PTA inhalers. Patient did not take meds last night or this AM due to feeling poorly. Staff called EMS.  Focused Assessment: HTN, tachy (known a.fib), LS wheezes, tachypnea/SOB, low grade temp. Failed activity attempt (see note)  Tests Performed: EKG, labs, UA, CXR, blood cultures  Abnormal Results: nothing significant, mild + UA, see results  Treatments provided: supplemental o2 for mild hypoxia (91%), albuterol neb, tylenol, solu-medrol. monitors    Family Comments: NH staff LM for her daughter and granddaughter per EMS report, have not arrived here yet    OBS brochure/video " discussed/provided to patient: N/A    ED Medications:  Medications   sodium chloride (PF) 0.9% PF flush 3 mL (not administered)   sodium chloride (PF) 0.9% PF flush 3 mL ( Intracatheter Canceled Entry 5/22/17 1146)   albuterol neb solution 5 mg (5 mg Nebulization Given 5/22/17 1145)       Drips infusing?:  No      ED NURSE PHONE NUMBER: [phone]

## 2017-05-22 NOTE — ED NOTES
Supplemental o2 turned off after albuterol neb to assess oxygenation. At baseline patient does not wear home o2.

## 2017-05-22 NOTE — ED NOTES
Tried to get patient to chair. O2 sats dipped to 89%, patient felt nauseous, flushed, weak. Feet were slipping out from under her legs. HR jumped up to 145 bpm. Dr Mcpherson updated.

## 2017-05-22 NOTE — PHARMACY-ADMISSION MEDICATION HISTORY
Admission medication history interview status for the 5/22/2017  admission is complete. See EPIC admission navigator for prior to admission medications     Medication history source reliability:Good    Actions taken by pharmacist (provider contacted, etc): Called pt's daughter, Etta. Also called [facility] pharmacy to verify about sertraline and citalopram.     Additional medication history information not noted on PTA med list :  -Per daughter, pt is not on aspirin, hydrochlorothiazide, and kenalog cream. Deleted these meds.   -Also pt's daughter said pt is on both citalopram and sertraline. Per pharmacy, she has been consistently refilling both them. Recently, sertraline was filled on 5/11/17, and citalopram on 5/8/17.  -Pt stated that she did not take any of her meds today.    Medication reconciliation/reorder completed by provider prior to medication history? Yes    Time spent in this activity: 15 min    Prior to Admission medications    Medication Sig Last Dose Taking? Auth Provider   HYDROcodone-acetaminophen (NORCO) 5-325 MG per tablet Take 1 tablet by mouth 2 times daily 5/21/2017 at Unknown time Yes Unknown, Entered By History   acetaminophen (TYLENOL) 325 MG tablet Take 2 tablets (650 mg) by mouth every 4 hours as needed for mild pain prn Yes Tamiko Zamudio MD   LISINOPRIL PO Take 40 mg by mouth daily 5/21/2017 at Unknown time Yes Unknown, Entered By History   AMLODIPINE BESYLATE PO Take 5 mg by mouth daily 5/21/2017 at Unknown time Yes Unknown, Entered By History   GABAPENTIN PO Take 900 mg by mouth At Bedtime (takes 3 x 300 mg caps) 5/21/2017 at Unknown time Yes Unknown, Entered By History   Alendronate Sodium (FOSAMAX PO) Take 70 mg by mouth once a week  Yes Unknown, Entered By History   SERTRALINE HCL PO Take 50 mg by mouth daily 5/21/2017 at Unknown time Yes Unknown, Entered By History   metoprolol (TOPROL-XL) 25 MG 24 hr tablet Take 1 tablet by mouth daily. 5/21/2017 at Unknown time Yes Yusef  Abrahan Page,    albuterol (PROAIR HFA) 108 (90 BASE) MCG/ACT inhaler INHALE 2 PUFFS BY MOUTH EVERY 4 TO 6 HOURS AS NEEDED FOR WHEEZE prn Yes Dayton Huston MD   omeprazole (PRILOSEC) 20 MG capsule TAKE ONE CAPSULE BY MOUTH DAILY 30 MINUTES BEFORE BREAKFAST 5/21/2017 at Unknown time Yes Bill Smith MD   citalopram (CELEXA) 40 MG tablet TAKE ONE TABLET BY MOUTH DAILY 5/21/2017 at Unknown time Yes Michael Saxena MD   Multiple Vitamins-Minerals (CENTRUM SILVER) per tablet Take 1 tablet by mouth daily. 5/21/2017 at Unknown time Yes Unknown, Entered By History   montelukast (SINGULAIR) 10 MG tablet Take 10 mg by mouth At Bedtime. 5/21/2017 at Unknown time Yes Reported, Patient   order for DME Equipment being ordered: Joshua Kirby, PACassidyC

## 2017-05-22 NOTE — ED NOTES
Bed: ED21  Expected date: 5/22/17  Expected time: 10:15 AM  Means of arrival: Ambulance  Comments:  [age] Asthma

## 2017-05-22 NOTE — IP AVS SNAPSHOT
"[place] [address] [facility]: [phone]                                              INTERAGENCY TRANSFER FORM - LAB / IMAGING / EKG / EMG RESULTS   2017                    Hospital Admission Date: 2017  АННА GUZMAN   : 3/13/1926  Sex: Female        Attending Provider: Mauro Prater MD     Allergies:  Fish Allergy, Novocain [Procaine Hcl], Shellfish-derived Products, Fosamax [Alendronate Sodium], Penicillins    Infection:  None   Service:  HOSPITALIST    Ht:  1.473 m (4' 10\")   Wt:  49.2 kg (108 lb 7.5 oz)   Admission Wt:  47.6 kg (105 lb)    BMI:  22.67 kg/m 2   BSA:  1.42 m 2            Patient PCP Information     Provider PCP Type    Dayton Huston MD General         Lab Results - 3 Days      Blood culture [[id number]]  Resulted: 17 0609, Result status: Preliminary result    Ordering provider: Tony Mcpherson MD  17 1041 Resulting lab: [facility]    Specimen Information    Type Source Collected On   Blood Hand, Left 17 1127          Components       Value Reference Range Flag Lab   Specimen Description Blood Left Hand   FrStHsLb   Special Requests Aerobic and anaerobic bottles received   75   Culture Micro No growth after 3 days   226   Micro Report Status Pending   226            Blood culture [[id number]]  Resulted: 17 0609, Result status: Preliminary result    Ordering provider: Tony Mcpherson MD  17 1041 Resulting lab: [facility]    Specimen Information    Type Source Collected On   Blood Hand, Left 17 1053          Components       Value Reference Range Flag Lab   Specimen Description Blood Left Hand   FrStHsLb   Special Requests Aerobic and anaerobic bottles received   75   Culture Micro No growth after 3 days   226   Micro Report Status Pending   226            Urine Culture Aerobic Bacterial [[id number]] (Abnormal)  Resulted: 17 0103, Result status: Final result    " Ordering provider: Mauro Prater MD  05/22/17 1410 Resulting lab: [facility]    Specimen Information    Type Source Collected On   Urine  05/22/17 1122          Components       Value Reference Range Flag Lab   Specimen Description Catheterized Urine   FrStHsLb   Special Requests Specimen received in preservative   75   Culture Micro --  A 225   Result:         >100,000 colonies/mL Escherichia coli  >100,000 colonies/mL Klebsiella pneumoniae     Micro Report Status FINAL 05/25/2017   225   Result:     Organism: >100,000 colonies/mL Escherichia coli   225   Organism: >100,000 colonies/mL Klebsiella pneumoniae   225            Sputum Culture Aerobic Bacterial [[id number]]  Resulted: 05/24/17 1041, Result status: Final result    Ordering provider: Mauro Prater MD  05/22/17 1410 Resulting lab: [facility]    Specimen Information    Type Source Collected On   Sputum  05/22/17 1500          Components       Value Reference Range Flag Lab   Specimen Description Sputum   FrStHsLb   Culture Micro Heavy growth Normal les   225   Micro Report Status FINAL 05/24/2017   225            Basic metabolic panel [[id number]] (Abnormal)  Resulted: 05/24/17 0854, Result status: Final result    Ordering provider: Ruben Mir MD  05/24/17 0000 Resulting lab: [facility]    Specimen Information    Type Source Collected On   Blood  05/24/17 0755          Components       Value Reference Range Flag Lab   Sodium 139 133 - 144 mmol/L  FrStHsLb   Potassium 3.6 3.4 - 5.3 mmol/L  FrStHsLb   Chloride 104 94 - 109 mmol/L  FrStHsLb   Carbon Dioxide 25 20 - 32 mmol/L  FrStHsLb   Anion Gap 10 3 - 14 mmol/L  FrStHsLb   Glucose 104 70 - 99 mg/dL H FrStHsLb   Urea Nitrogen 26 7 - 30 mg/dL  FrStHsLb   Creatinine 0.80 0.52 - 1.04 mg/dL  FrStHsLb   GFR Estimate 67 >60 mL/min/1.7m2  FrStHsLb   Comment:  Non  GFR Calc   GFR Estimate If Black 81 >60  mL/min/1.7m2  FrStHsLb   Comment:  African American GFR Calc   Calcium 8.6 8.5 - 10.1 mg/dL  FrStHsLb            Basic metabolic panel [[id number]] (Abnormal)  Resulted: 05/23/17 0829, Result status: Final result    Ordering provider: Mauro Prater MD  05/23/17 0001 Resulting lab: [facility]    Specimen Information    Type Source Collected On   Blood  05/23/17 0755          Components       Value Reference Range Flag Lab   Sodium 135 133 - 144 mmol/L  FrStHsLb   Potassium 3.8 3.4 - 5.3 mmol/L  FrStHsLb   Chloride 103 94 - 109 mmol/L  FrStHsLb   Carbon Dioxide 19 20 - 32 mmol/L L FrStHsLb   Anion Gap 13 3 - 14 mmol/L  FrStHsLb   Glucose 158 70 - 99 mg/dL H FrStHsLb   Urea Nitrogen 26 7 - 30 mg/dL  FrStHsLb   Creatinine 0.82 0.52 - 1.04 mg/dL  FrStHsLb   GFR Estimate 66 >60 mL/min/1.7m2  FrStHsLb   Comment:  Non  GFR Calc   GFR Estimate If Black 79 >60 mL/min/1.7m2  FrStHsLb   Comment:  African American GFR Calc   Calcium 8.8 8.5 - 10.1 mg/dL  FrStHsLb            CBC with platelets [[id number]]  Resulted: 05/23/17 0814, Result status: Final result    Ordering provider: Mauro Prater MD  05/23/17 0001 Resulting lab: [facility]    Specimen Information    Type Source Collected On   Blood  05/23/17 0755          Components       Value Reference Range Flag Lab   WBC 7.0 4.0 - 11.0 10e9/L  FrStHsLb   RBC Count 3.83 3.8 - 5.2 10e12/L  FrStHsLb   Hemoglobin 11.8 11.7 - 15.7 g/dL  FrStHsLb   Hematocrit 35.8 35.0 - 47.0 %  FrStHsLb   MCV 94 78 - 100 fl  FrStHsLb   MCH 30.8 26.5 - 33.0 pg  FrStHsLb   MCHC 33.0 31.5 - 36.5 g/dL  FrStHsLb   RDW 13.4 10.0 - 15.0 %  FrStHsLb   Platelet Count 250 150 - 450 10e9/L  FrStHsLb            Gram stain [[id number]]  Resulted: 05/22/17 2130, Result status: Final result    Ordering provider: Mauro Prater MD  05/22/17 1410 Resulting lab: [facility]    Specimen Information    Type Source Collected On   Sputum   05/22/17 1500          Components       Value Reference Range Flag Lab   Specimen Description Sputum   FrStHsLb   Special Requests Screen   75   Gram Stain --   226   Result:         <10 Squamous epithelial cells/low power field  >25 PMNs/low power field  Many Mixed gram positive and gram negative bacteria present.     Micro Report Status FINAL 05/22/2017   226   Result:              Troponin I - Now then in 6 hours x 2   [[id number]]  Resulted: 05/22/17 2042, Result status: Final result    Ordering provider: Mauro Prater MD  05/22/17 1417 Resulting lab: [facility]    Specimen Information    Type Source Collected On   Blood  05/22/17 2012          Components       Value Reference Range Flag Lab   Troponin I ES -- 0.000 - 0.045 ug/L  FrStHsLb   Result:         <0.015  The 99th percentile for upper reference range is 0.045 ug/L.  Troponin values in   the range of 0.045 - 0.120 ug/L may be associated with risks of adverse   clinical events.              TSH with free T4 reflex [[id number]]  Resulted: 05/22/17 1533, Result status: Final result    Ordering provider: Mauro Prater MD  05/22/17 1410 Resulting lab: [facility]    Specimen Information    Type Source Collected On   Blood  05/22/17 1450          Components       Value Reference Range Flag Lab   TSH 1.25 0.40 - 4.00 mU/L  FrStHsLb            Magnesium  [[id number]]  Resulted: 05/22/17 1529, Result status: Final result    Ordering provider: Mauro Prater MD  05/22/17 1410 Resulting lab: [facility]    Specimen Information    Type Source Collected On   Blood  05/22/17 1450          Components       Value Reference Range Flag Lab   Magnesium 1.6 1.6 - 2.3 mg/dL  FrStHsLb            Troponin I - Now then in 6 hours x 2   [[id number]]  Resulted: 05/22/17 1529, Result status: Final result    Ordering provider: Mauro Prater MD  05/22/17 1410 Resulting lab: [facility]     Specimen Information    Type Source Collected On   Blood  05/22/17 1450          Components       Value Reference Range Flag Lab   Troponin I ES -- 0.000 - 0.045 ug/L  FrStHsLb   Result:         <0.015  The 99th percentile for upper reference range is 0.045 ug/L.  Troponin values in   the range of 0.045 - 0.120 ug/L may be associated with risks of adverse   clinical events.              Lactic acid level STAT [[id number]]  Resulted: 05/22/17 1508, Result status: Final result    Ordering provider: Mauro Prater MD  05/22/17 1413 Resulting lab: [facility]    Specimen Information    Type Source Collected On   Blood  05/22/17 1450          Components       Value Reference Range Flag Lab   Lactic Acid 1.4 0.7 - 2.1 mmol/L  FrStHsLb            UA with Microscopic [[id number]] (Abnormal)  Resulted: 05/22/17 1142, Result status: Final result    Ordering provider: Tony Mcpherson MD  05/22/17 1041 Resulting lab: [facility]    Specimen Information    Type Source Collected On   Urine Urine catheter 05/22/17 1122          Components       Value Reference Range Flag Lab   Color Urine Yellow   FrStHsLb   Appearance Urine Slightly Cloudy   FrStHsLb   Glucose Urine Negative NEG mg/dL  FrStHsLb   Bilirubin Urine Negative NEG  FrStHsLb   Ketones Urine 10 NEG mg/dL A FrStHsLb   Specific [place] Urine 1.019 1.003 - 1.035  FrStHsLb   Blood Urine Trace NEG A FrStHsLb   pH Urine 6.0 5.0 - 7.0 pH  FrStHsLb   Protein Albumin Urine 100 NEG mg/dL A FrStHsLb   Urobilinogen mg/dL Normal 0.0 - 2.0 mg/dL  FrStHsLb   Nitrite Urine Negative NEG  FrStHsLb   Leukocyte Esterase Urine Moderate NEG A FrStHsLb   Source Catheterized Urine   FrStHsLb   WBC Urine 10 0 - 2 /HPF H FrStHsLb   RBC Urine 1 0 - 2 /HPF  FrStHsLb   Bacteria Urine Moderate NEG /HPF A FrStHsLb            Basic metabolic panel [[id number]] (Abnormal)  Resulted: 05/22/17 1118, Result status: Final result    Ordering provider: Tony Mcpherson  MD  05/22/17 1041 Resulting lab: [facility]    Specimen Information    Type Source Collected On   Blood  05/22/17 1045          Components       Value Reference Range Flag Lab   Sodium 136 133 - 144 mmol/L  FrStHsLb   Potassium 3.8 3.4 - 5.3 mmol/L  FrStHsLb   Chloride 101 94 - 109 mmol/L  FrStHsLb   Carbon Dioxide 22 20 - 32 mmol/L  FrStHsLb   Anion Gap 13 3 - 14 mmol/L  FrStHsLb   Glucose 123 70 - 99 mg/dL H FrStHsLb   Urea Nitrogen 23 7 - 30 mg/dL  FrStHsLb   Creatinine 0.97 0.52 - 1.04 mg/dL  FrStHsLb   GFR Estimate 54 >60 mL/min/1.7m2 L FrStHsLb   Comment:  Non  GFR Calc   GFR Estimate If Black 65 >60 mL/min/1.7m2  FrStHsLb   Comment:  African American GFR Calc   Calcium 9.3 8.5 - 10.1 mg/dL  FrStHsLb            CBC with platelets differential [[id number]]  Resulted: 05/22/17 1104, Result status: Final result    Ordering provider: Tony Mcpherson MD  05/22/17 1041 Resulting lab: [facility]    Specimen Information    Type Source Collected On   Blood  05/22/17 1045          Components       Value Reference Range Flag Lab   WBC 7.7 4.0 - 11.0 10e9/L  FrStHsLb   RBC Count 4.16 3.8 - 5.2 10e12/L  FrStHsLb   Hemoglobin 12.9 11.7 - 15.7 g/dL  FrStHsLb   Hematocrit 38.5 35.0 - 47.0 %  FrStHsLb   MCV 93 78 - 100 fl  FrStHsLb   MCH 31.0 26.5 - 33.0 pg  FrStHsLb   MCHC 33.5 31.5 - 36.5 g/dL  FrStHsLb   RDW 13.5 10.0 - 15.0 %  FrStHsLb   Platelet Count 286 150 - 450 10e9/L  FrStHsLb   Diff Method Automated Method   FrStHsLb   % Neutrophils 65.5 %  FrStHsLb   % Lymphocytes 25.3 %  FrStHsLb   % Monocytes 7.6 %  FrStHsLb   % Eosinophils 0.8 %  FrStHsLb   % Basophils 0.4 %  FrStHsLb   % Immature Granulocytes 0.4 %  FrStHsLb   Absolute Neutrophil 5.1 1.6 - 8.3 10e9/L  FrStHsLb   Absolute Lymphocytes 2.0 0.8 - 5.3 10e9/L  FrStHsLb   Absolute Monocytes 0.6 0.0 - 1.3 10e9/L  FrStHsLb   Absolute Eosinophils 0.1 0.0 - 0.7 10e9/L  FrStHsLb   Absolute Basophils 0.0 0.0 - 0.2 10e9/L   "FrStHsLb   Abs Immature Granulocytes 0.0 0 - 0.4 10e9/L  FrStHsLb            Testing Performed By     Lab - Abbreviation Name Director Address Valid Date Range    14 - FrStHsLb [facility] Unknown [address] [place] [address] 05/08/15 1057 - Present    75 - Unknown [facility] Unknown [address] [facility] [address] 01/15/15 1019 - Present    225 - Unknown [facility] Unknown [address] [facility] [address] 12/19/14 0954 - Present    226 - Unknown [facility] Unknown [address] [facility] [address] 12/19/14 0955 - Present            Unresulted Labs (24h ago through future)    Start       Ordered    Unscheduled  Potassium  (Potassium Replacement - \"Standard\" - For K levels less than 3.4 mmol/L - UU,UR,UA,RH,SH,PH,WY )  CONDITIONAL (SPECIFY),   Routine     Comments:  Obtain Potassium Level for these conditions:  *IF no potassium result within 24 hours before initiation of order set, draw potassium level with next lab collect.    *2 HOURS AFTER last IV potassium replacement dose and 4 hours after an oral replacement dose.  *Next morning after potassium dose.     Repeat Potassium Replacement if necessary.    05/22/17 1410         Imaging Results - 3 Days      XR Chest 2 Views [[id number]]  Resulted: 05/22/17 1137, Result status: Final result    Ordering provider: Tony Mcpherson MD  05/22/17 1041 Resulted by: Francisco Coppola MD    Performed: 05/22/17 1053 - 05/22/17 1106 Resulting lab: RADIOLOGY RESULTS    Narrative:       CHEST TWO VIEWS  5/22/2017 11:06 AM     HISTORY: Productive cough.    COMPARISON: 12/28/2016.    FINDINGS: Advanced thoracolumbar scoliosis concave to the right at the  thoracolumbar junction. Heart size normal. Lungs clear. No interval  change.      Impression:       IMPRESSION: Nothing acute. No interval change.    FRANCISCO COPPOLA MD      Testing Performed By     Lab - " Abbreviation Name Director Address Valid Date Range    104 - Rad Rslts RADIOLOGY RESULTS Unknown Unknown 02/16/05 1553 - Present            Encounter-Level Documents:     There are no encounter-level documents.      Order-Level Documents:     There are no order-level documents.

## 2017-05-22 NOTE — H&P
PRIMARY CARE PHYSICIAN:  Dayton Huston MD      CHIEF COMPLAINT:  Generalized weakness, dyspnea, wheezing.      HISTORY OF PRESENT ILLNESS:  Laya Lutz is a 89-year-old female with a past medical history of asthma, hypertension, depression, anxiety, GERD who presents to the Emergency Department with worsening dyspnea and generalized weakness.  Patient has noted having cough and congestion over the last several days.  Positive urinary frequency.  Generalized weakness.  Positive change in phlegm.  EMS gave DuoNeb en route.  Patient is afebrile.  She otherwise denies chest pain.      In the Emergency Department, patient was treated for asthma exacerbation with more DuoNeb and Solu-Medrol.  She is also known to be in afib with RVR.  She has afib baseline.  Furthermore, urinalysis demonstrated positive WBCs and leukocyte esterase.  Patient is being admitted for further evaluation and care.      PAST MEDICAL HISTORY:   1.  Atrial fibrillation, not on anticoagulation.   2.  Essential hypertension.   3.  Asthma without a history of smoking.   4.  Depression.   5.  Spinal stenosis.      PAST SURGICAL HISTORY:   1.  Cataract.   2.  Appendectomy.   3.  Hysterectomy.   4.  Cholecystectomy.   5.  Lumbar diskectomy      HOME MEDICATIONS:   1.  Kenalog cream.   2.  Hydrochlorothiazide 12.5 mg daily.   3.  Norco 10/325 q. 6 hours p.r.n. severe pain.   4.  Tylenol 650 q 4 hours p.r.n. pain.   5.  Aspirin 81 mg daily.   6.  Lisinopril 40 mg daily.   7.  Amlodipine 5 mg daily.   8.  Gabapentin 900 mg each day at bedtime.   9.  Fosamax 70 mg each week.   10.  Sertraline 50 mg daily.   11.  Toprol-XL 25 mg q. day.   12.  Prilosec 20 mg q. day.   13.  Celexa 40 mg q. day.   14.  Multivitamin.   15.  Singulair 10 mg each day at bedtime.      ALLERGIES:   1.  Fish.   2.  Novocain.   3.  Fosamax.   4.  Penicillin.      SOCIAL HISTORY:  Patient is .  She has a daughter.  She lives in assisted living.  She is a never smoker.   Occasional alcohol.      FAMILY HISTORY:  Reviewed and noncontributory.      REVIEW OF SYSTEMS:  Ten-point review obtained with pertinent positives and negatives per HPI, otherwise negative.      PHYSICAL EXAMINATION:   VITAL SIGNS:  BP stabled, pulse rate 131 and irregular, respiratory rate 22, sats 92% on room air.   NECK:  Supple.   PULMONARY:  Basilar wheezing, tachypneic.   CARDIAC:  Irregular, tachycardic.   ABDOMEN:  Soft.   MUSCULOSKELETAL:  Range of motion intact in all extremities.  No redness.   SKIN:  There are no open lesions anteriorly.   PSYCHIATRIC:  Appropriate affect.   NEUROLOGIC:  Cranial nerves II-XII grossly intact.  Sensation equal in all extremities.      LABORATORY DATA:  Urinalysis shows 10 WBCs, moderate leukocyte esterase.  Two blood cultures have been obtained.  Sodium is 136, potassium 3.8, BUN 23, creatinine is 0.97.  WBC 7.7, hemoglobin 12.9, platelets are 289.  Chest x-ray shows no infiltrates.  EKG:  Afib with RVR.      ASSESSMENT AND PLAN:  The patient is a 89-year-old female with a past medical history of hypertension, atrial fibrillation, asthma, depression and spinal stenosis who presents with dyspnea, generalized weakness.     1.  Acute asthma exacerbation:  Secondary to bronchitis.  Given change in phlegm, will treat with antibiotics.  Patient has suspect urine.  Will initiate on ciprofloxacin.  Patient has a penicillin allergy.  Albuterol scheduled and p.r.n.  Solu-Medrol q. 12 hours, then transition to prednisone.  Incentive spirometry.     2.  Atrial fibrillation with rapid ventricular response:  Suspect secondary to #1.  Will reinitiate back on prior to admit metoprolol.  She does not appear to be on anticoagulation.  Pharmacy to review medication list, is pending.  P.r.n. metoprolol available.     3.  Urinary tract infection:  Suspect urinalysis.  Possibly some increased urinary frequency.  She always has generalized weakness.  Will initiate on ciprofloxacin while we add  on urine culture and follow up on this.  Blood cultures have been obtained.     4.  Generalized weakness:  Multifactorial.  Suspect this will take at least a couple days for this to all turn around.  Will treat as above.  Physical Therapy and Occupational Therapy.     5.  Hypertension:  Elevated.  Hold on prior to admit hydrochlorothiazide as we do fluids.  Otherwise, will restart back on lisinopril, amlodipine and metoprolol.     6.  Depression and anxiety:  Continue on prior to admit sertraline and citalopram.     7.  Fluids, electrolytes and nutrition:  Electrolytes otherwise stable.  Normal saline at 75 an hour.  Recheck labs again in the morning.  Wean off as able to.  Regular diet.     8.  Deep venous thrombosis prophylaxis:  Not indicated.     9.  Gastrointestinal prophylaxis:  Continue on prior to admit proton pump inhibitor, Prilosec.      CODE STATUS:  DNR/DNI and this was discussed with the patient.      DISPOSITION:  Will bring under inpatient given multiple infectious agents in a 89-year-old.  Will likely take at least a couple nights to turn all this around.         OTIS RODRIGUEZ MD             D: 2017 14:15   T: 2017 15:05   MT: TS      Name:     АННА GUZMAN   MRN:      [id number]        Account:      [id number]   :      1926           Admitted:     [id number]      Document: [id number]       cc: Dayton Huston MD

## 2017-05-22 NOTE — IP AVS SNAPSHOT
` [place] [address] [facility]: [phone]            Medication Administration Report for Laya Lutz as of 05/25/17 1211   Legend:    Given Hold Not Given Due Canceled Entry Other Actions    Time Time (Time) Time  Time-Action       Inactive    Active    Linked        Medications 05/19/17 05/20/17 05/21/17 05/22/17 05/23/17 05/24/17 05/25/17    acetaminophen (TYLENOL) Suppository 650 mg  Dose: 650 mg Freq: EVERY 4 HOURS PRN Route: RE  PRN Reason: mild pain  Start: 05/22/17 1410   Admin Instructions: Alternate ibuprofen (if ordered) with acetaminophen.  Maximum acetaminophen dose from all sources = 75 mg/kg/day not to exceed 4 grams/day.               acetaminophen (TYLENOL) tablet 650 mg  Dose: 650 mg Freq: EVERY 4 HOURS PRN Route: PO  PRN Reason: mild pain  Start: 05/22/17 1410   Admin Instructions: Alternate ibuprofen (if ordered) with acetaminophen.  Maximum acetaminophen dose from all sources = 75 mg/kg/day not to exceed 4 grams/day.        1859 (650 mg)-Given        1646 (650 mg)-Given             albuterol (PROAIR HFA/PROVENTIL HFA/VENTOLIN HFA) Inhaler 2 puff  Dose: 2 puff Freq: EVERY 4 HOURS PRN Route: IN  PRN Reasons: wheezing,shortness of breath / dyspnea  Start: 05/24/17 1003         1816 (2 puff)-Given       2211 (2 puff)-Given        1053 (2 puff)-Given           albuterol neb solution 2.5 mg  Dose: 3 mL Freq: EVERY 2 HOURS PRN Route: NEBULIZATION  PRN Reason: shortness of breath / dyspnea  Start: 05/22/17 1410         0113 (2.5 mg)-Given        1103 (2.5 mg)-Given           amLODIPine (NORVASC) tablet 5 mg  Dose: 5 mg Freq: DAILY Route: PO  Start: 05/22/17 1415   Admin Instructions: Hold for SBP < 105        1705 (5 mg)-Given        0905 (5 mg)-Given        0848 (5 mg)-Given        0815 (5 mg)-Given           bisacodyl (DULCOLAX) Suppository 10 mg  Dose: 10 mg Freq: DAILY PRN Route: RE  PRN Reason: constipation  Start: 05/22/17 1410   Admin Instructions: Hold for  "loose stools.  This is the third step of a three step constipation treatment protocol.               budesonide (PULMICORT FLEXHALER) inhaler 1 puff  Dose: 1 puff Freq: 2 TIMES DAILY Route: IN  Start: 05/24/17 1015   Admin Instructions: Please provide teaching with first dose.          1112 (1 puff)-Given       2129 (1 puff)-Given        0816 (1 puff)-Given       [ ] 2100           citalopram (celeXA) tablet 40 mg  Dose: 40 mg Freq: DAILY Route: PO  Start: 05/22/17 1415       1704 (40 mg)-Given        0905 (40 mg)-Given        0848 (40 mg)-Given        0815 (40 mg)-Given           gabapentin (NEURONTIN) capsule 900 mg  Dose: 900 mg Freq: AT BEDTIME Route: PO  Start: 05/22/17 2200       2030 (900 mg)-Given               2125 (900 mg)-Given        2129 (900 mg)-Given        [ ] 2200           HYDROcodone-acetaminophen (NORCO) 5-325 MG per tablet 1 tablet  Dose: 1 tablet Freq: 2 TIMES DAILY PRN Route: PO  PRN Reason: moderate to severe pain  Start: 05/22/17 1657   Admin Instructions: Maximum acetaminophen dose from all sources= 75 mg/kg/day not to exceed 4 grams        2002 (1 tablet)-Given              lidocaine (LMX4) cream  Freq: EVERY 1 HOUR PRN Route: Top  PRN Reason: pain  PRN Comment: with VAD insertion or accessing implanted port.  Start: 05/22/17 1410   Admin Instructions: Do NOT give if patient has a history of allergy to any local anesthetic or any \"ron\" product.   Apply 30 minutes prior to VAD insertion or port access.  MAX Dose:  2.5 g (  of 5 g tube)               lidocaine 1 % 1 mL  Dose: 1 mL Freq: EVERY 1 HOUR PRN Route: OTHER  PRN Comment: mild pain with VAD insertion or accessing implanted port  Start: 05/22/17 1410   Admin Instructions: Do NOT give if patient has a history of allergy to any local anesthetic or any \"ron\" product. MAX dose 1 mL subcutaneous OR intradermal in divided doses.               lisinopril (PRINIVIL/ZESTRIL) tablet 40 mg  Dose: 40 mg Freq: DAILY Route: PO  Start: 05/22/17 " 1415   Admin Instructions: Hold for SBP , 105        1703 (40 mg)-Given        0905 (40 mg)-Given        0849 (40 mg)-Given        0814 (40 mg)-Given           melatonin tablet 1 mg  Dose: 1 mg Freq: AT BEDTIME PRN Route: PO  PRN Reason: sleep  Start: 05/22/17 1410   Admin Instructions: Do not give unless at least 6 hours of uninterrupted sleep is expected.               metoprolol (LOPRESSOR) injection 2.5 mg  Dose: 2.5 mg Freq: EVERY 4 HOURS PRN Route: IV  PRN Reason: other  PRN Comment: HR > 120, hold for SBP < 90  Start: 05/23/17 1458              metoprolol (TOPROL-XL) 24 hr tablet 25 mg  Dose: 25 mg Freq: DAILY Route: PO  Start: 05/22/17 1415   Admin Instructions: DO NOT CRUSH. Tablet may be split in half along score line.  Hold for SBP < 105        1712 (25 mg)-Given        0905 (25 mg)-Given        0849 (25 mg)-Given        0815 (25 mg)-Given           montelukast (SINGULAIR) tablet 10 mg  Dose: 10 mg Freq: AT BEDTIME Route: PO  Start: 05/22/17 2200       2030 (10 mg)-Given               2125 (10 mg)-Given        2129 (10 mg)-Given        [ ] 2200           naloxone (NARCAN) injection 0.1-0.4 mg  Dose: 0.1-0.4 mg Freq: EVERY 2 MIN PRN Route: IV  PRN Reason: opioid reversal  Start: 05/22/17 1410   Admin Instructions: For respiratory rate LESS than or EQUAL to 8.  Partial reversal dose:  0.1 mg titrated q 2 minutes for Analgesia Side Effects Monitoring Sedation Level of 3 (frequently drowsy, arousable, drifts to sleep during conversation).Full reversal dose:  0.4 mg bolus for Analgesia Side Effects Monitoring Sedation Level of 4 (somnolent, minimal or no response to stimulation).               No anticoagulants IF patient has had acute trauma/surgery or recent intracranial, GI or urinary tract bleeding.   Freq: DOES NOT GO TO MAR Route: OTHER  PRN Reason: other  Start: 05/22/17 1410   Admin Instructions: No anticoagulants IF patient has had acute trauma/surgery or recent intracranial, GI or urinary tract  bleeding.                  omeprazole (priLOSEC) CR capsule 20 mg  Dose: 20 mg Freq: 2 TIMES DAILY BEFORE MEALS Route: PO  Start: 05/22/17 1630       1702 (20 mg)-Given        0620 (20 mg)-Given              1643 (20 mg)-Given        0649 (20 mg)-Given       1555 (20 mg)-Given        0611 (20 mg)-Given              [ ] 1630           ondansetron (ZOFRAN-ODT) ODT tab 4 mg  Dose: 4 mg Freq: EVERY 6 HOURS PRN Route: PO  PRN Reason: nausea  Start: 05/22/17 1410   Admin Instructions: This is Step 1 of nausea and vomiting management.  If nausea not resolved in 15 minutes, go to Step 2 prochlorperazine (COMPAZINE). Do not push through foil backing. Peel back foil and gently remove. Place on tongue immediately. Administration with liquid unnecessary              Or  ondansetron (ZOFRAN) injection 4 mg  Dose: 4 mg Freq: EVERY 6 HOURS PRN Route: IV  PRN Reasons: nausea,vomiting  Start: 05/22/17 1410   Admin Instructions: This is Step 1 of nausea and vomiting management.  If nausea not resolved in 15 minutes, go to Step 2 prochlorperazine (COMPAZINE).  Irritant.               polyethylene glycol (MIRALAX/GLYCOLAX) Packet 17 g  Dose: 17 g Freq: DAILY PRN Route: PO  PRN Reason: constipation  Start: 05/22/17 1410   Admin Instructions: Give in 8oz of  water, juice, or soda. Hold for loose stools.  This is the second step of a three step constipation treatment protocol.  1 Packet = 17 grams. Mixed prescribed dose in 8 ounces of water. Follow with 8 oz. of water.               potassium chloride (KLOR-CON) Packet 20-40 mEq  Dose: 20-40 mEq Freq: EVERY 2 HOURS PRN Route: ORAL OR FEED  PRN Reason: potassium supplementation  Start: 05/22/17 1410   Admin Instructions: Use if unable to tolerate tablets.  If Serum K+ 3.0-3.3, dose = 60 mEq po total dose (40 mEq x1 followed in 2 hours by 20 mEq x1). Recheck K+ level 4 hours after dose and the next AM.  If Serum K+ 2.5-2.9, dose = 80 mEq po total dose (40 mEq Q2H x2). Recheck K+ level 4  hours after dose and the next AM.  If Serum K+ less than 2.5, See IV order.  Dissolve packet contents in 4-8 ounces of cold water or juice.               potassium chloride 10 mEq in 100 mL intermittent infusion  Dose: 10 mEq Freq: EVERY 1 HOUR PRN Route: IV  PRN Reason: potassium supplementation  Start: 05/22/17 1410   Admin Instructions: Infuse via PERIPHERAL LINE or CENTRAL LINE. Use for central line replacement if patient weight less than 65 kg, if patient is on TPN with high potassium content or if unit does not stock 20 mEq bags.   If Serum K+ 3.0-3.3, dose = 10 mEq/hr x4 doses (40 mEq IV total dose). Recheck K+ level 2 hours after dose and the next AM.   If Serum K+ less than 3.0, dose = 10 mEq/hr x6 doses (60 mEq IV total dose). Recheck K+ level 2 hours after dose and the next AM.               potassium chloride 10 mEq in 100 mL intermittent infusion with 10 mg lidocaine  Dose: 10 mEq Freq: EVERY 1 HOUR PRN Route: IV  PRN Reason: potassium supplementation  Start: 05/22/17 1410   Admin Instructions: Infuse via PERIPHERAL LINE. Use potassium with lidocaine for pain with peripheral administration.  If Serum K+ 3.0-3.3, dose = 10 mEq/hr x4 doses (40 mEq IV total dose). Recheck K+ level 2 hours after dose and the next AM.  If Serum K+ less than 3.0, dose = 10 mEq/hr x6 doses (60 mEq IV total dose). Recheck K+ level 2 hours after dose and the next AM.               potassium chloride 20 mEq in 50 mL intermittent infusion  Dose: 20 mEq Freq: EVERY 1 HOUR PRN Route: IV  PRN Reason: potassium supplementation  Start: 05/22/17 1410   Admin Instructions: Infuse via CENTRAL LINE Only. May need EKG if less than 65 kg or on TPN - Max rate is 0.3 mEq/kg/hr for patients not on EKG monitoring.   If Serum K+ 3.0-3.3, dose = 20 mEq/hr x2 doses (40 mEq IV total dose). Recheck K+ level 2 hours after dose and the next AM.  If Serum K+ less than 3.0, dose = 20 mEq/hr x3 doses (60 mEq IV total dose). Recheck K+ level 2 hours after  dose and the next AM.               potassium chloride SA (K-DUR/KLOR-CON M) CR tablet 20-40 mEq  Dose: 20-40 mEq Freq: EVERY 2 HOURS PRN Route: PO  PRN Reason: potassium supplementation  Start: 05/22/17 1410   Admin Instructions: Use if able to take PO.   If Serum K+ 3.0-3.3, dose = 60 mEq po total dose (40 mEq x1 followed in 2 hours by 20 mEq x1). Recheck K+ level 4 hours after dose and the next AM.  If Serum K+ 2.5-2.9, dose = 80 mEq po total dose (40 mEq Q2H x2). Recheck K+ level 4 hours after dose and the next AM.  If Serum K+ less than 2.5, See IV order.  DO NOT CRUSH               predniSONE (DELTASONE) tablet 60 mg  Dose: 60 mg Freq: DAILY Route: PO  Start: 05/24/17 0900         0848 (60 mg)-Given        0815 (60 mg)-Given           senna-docusate (SENOKOT-S;PERICOLACE) 8.6-50 MG per tablet 1-2 tablet  Dose: 1-2 tablet Freq: 2 TIMES DAILY PRN Route: PO  PRN Comment: constipation   Start: 05/22/17 1410   Admin Instructions: If no bowel movement in 24 hours, increase to 2 tablets PO BID.  Hold for loose stools.   This is the first step of a three step constipation treatment protocol.               sertraline (ZOLOFT) tablet 50 mg  Dose: 50 mg Freq: DAILY Route: PO  Start: 05/22/17 1415       1704 (50 mg)-Given        0905 (50 mg)-Given        0848 (50 mg)-Given        0815 (50 mg)-Given           sodium chloride (PF) 0.9% PF flush 3 mL  Dose: 3 mL Freq: EVERY 8 HOURS Route: IK  Start: 05/22/17 1415   Admin Instructions: And Q1H PRN, to lock peripheral IV dormant line.        1427 (3 mL)-Given       (2116)-Not Given        0621 (3 mL)-Given       1312 (3 mL)-Given              2126 (3 mL)-Given        0649 (3 mL)-Given       (1456)-Not Given       2130 (3 mL)-Given        0611 (3 mL)-Given       [ ] 1415       [ ] 2215           sodium chloride (PF) 0.9% PF flush 3 mL  Dose: 3 mL Freq: EVERY 1 HOUR PRN Route: IK  PRN Reason: line flush  PRN Comment: for peripheral IV flush post IV meds  Start: 05/22/17 1410              Completed Medications  Medications 05/19/17 05/20/17 05/21/17 05/22/17 05/23/17 05/24/17 05/25/17         Dose: 650 mg Freq: ONCE Route: PO  Start: 05/22/17 1230   End: 05/22/17 1234   Admin Instructions: Maximum acetaminophen dose from all sources = 75 mg/kg/day not to exceed 4 grams/day.        1234 (650 mg)-Given                Dose: 125 mg Freq: ONCE Route: IV  Start: 05/22/17 1309   End: 05/22/17 1316   Admin Instructions: Doses greater than 125 mg need to be in at least 50 mL IVPB        1316 (125 mg)-Given             Discontinued Medications  Medications 05/19/17 05/20/17 05/21/17 05/22/17 05/23/17 05/24/17 05/25/17         Rate: 75 mL/hr Freq: CONTINUOUS Route: IV  Start: 05/22/17 1415   End: 05/23/17 1438       1425 ( )-New Bag        0431 ( )-New Bag       1438-Med Discontinued           Dose: 2 puff Freq: 4 TIMES DAILY PRN Route: IN  PRN Reason: other  PRN Comment: dyspnea  Start: 05/22/17 1446   End: 05/24/17 1008       1809 (2 puff)-Given       2039 (2 puff)-Given         1008-Med Discontinued          Dose: 3 mL Freq: 4 TIMES DAILY Route: NEBULIZATION  Start: 05/23/17 1509   End: 05/24/17 1004        1509 (2.5 mg)-Given       1948 (2.5 mg)-Given        0722 (2.5 mg)-Given       1004-Med Discontinued          Dose: 3 mL Freq: EVERY 4 HOURS Route: NEBULIZATION  Start: 05/22/17 1600   End: 05/23/17 1458       1519 (2.5 mg)-Given       1911 (2.5 mg)-Given       2354 (2.5 mg)-Given        0346 (2.5 mg)-Given       0707 (2.5 mg)-Given       1114 (2.5 mg)-Given       1458-Med Discontinued           Dose: 81 mg Freq: DAILY Route: PO  Start: 05/22/17 1415   End: 05/22/17 1655   Admin Instructions: DO NOT CRUSH.               1655-Med Discontinued            Dose: 400 mg Freq: DAILY Route: IV  Indications Comment: UTI  Last Dose: 400 mg (05/24/17 0855)  Start: 05/22/17 1415   End: 05/24/17 0934   Admin Instructions: Irritant.        1507 (400 mg)-New Bag        1021 (400 mg)-New Bag        0855 (400  mg)-New Bag       0934-Med Discontinued          Dose: 62.5 mg Freq: EVERY 12 HOURS Route: IV  Start: 05/23/17 0000   End: 05/23/17 1438   Admin Instructions: First dose now if not given in ED.  Doses greater than 125 mg need to be in at least 50 mL IVPB         0000 (62.5 mg)-Given       1312 (62.5 mg)-Given       1438-Med Discontinued           Dose: 5 mg Freq: EVERY 6 HOURS Route: IV  Start: 05/22/17 1415   End: 05/23/17 1438   Admin Instructions: IF heart rate is greater than 110 bpm and systolic blood pressure over 90 mmHg.  Notify provider if Systolic Blood Pressure is less than 90 mmHg.  Check vital signs 10 minutes after each dose.        1459 (5 mg)-Given       2030 (5 mg)-Given        0235 (5 mg)-Given       (0906)-Not Given       1438-Med Discontinued           Dose: 0.1-0.4 mg Freq: EVERY 2 MIN PRN Route: IV  PRN Reason: opioid reversal  Start: 05/22/17 1410   End: 05/23/17 0906   Admin Instructions: For respiratory rate LESS than or EQUAL to 8.  Partial reversal dose:  0.1 mg titrated q 2 minutes for Analgesia Side Effects Monitoring Sedation Level of 3 (frequently drowsy, arousable, drifts to sleep during conversation).Full reversal dose:  0.4 mg bolus for Analgesia Side Effects Monitoring Sedation Level of 4 (somnolent, minimal or no response to stimulation).         0906-Med Discontinued           Dose: 3 mL Freq: EVERY 8 HOURS Route: IK  Start: 05/22/17 1042   End: 05/22/17 1410   Admin Instructions: And Q1H PRN, to lock peripheral IV dormant line.               1410-Med Discontinued            Dose: 3 mL Freq: EVERY 1 HOUR PRN Route: IK  PRN Reason: line flush  PRN Comment: for peripheral IV flush post IV meds  Start: 05/22/17 1040   End: 05/22/17 1410       1410-Med Discontinued       Medications 05/19/17 05/20/17 05/21/17 05/22/17 05/23/17 05/24/17 05/25/17

## 2017-05-22 NOTE — PROVIDER NOTIFICATION
"txt pg to Dr. Prater, \"FYI: pt requesting albuterol inhaler, can we order this? Or would you like pt to continue with just the albuterol nebs? please advise, thanks!\"    Addendum: albuterol inhaler ordered PRN, RN will educated pt on difference between nebs and inhaler, and encourage pt to use nebs.   "

## 2017-05-22 NOTE — ED PROVIDER NOTES
History     Chief Complaint:  Shortness of Breath    HPI   Laya Lutz is a 89 year old female with history of asthma who presents by EMS to the emergency department today for evaluation of worsening shortness of breath and difficulty breathing that began this morning. She states that she has been wheezy as well. Patient also reports productive cough with phlegm and cold-like symptoms that have been persistent for the last three days. She has felt hot concurrently with symptoms but has not had a measured temperature. Symptoms feel somewhat similar to asthma and she notes that symptoms did somewhat improve with inhaler at home. Patient missed today's medications. EMS administered two Duonebs en route and had measured temperature of 99.0. EMS also notes that home inhalers were empty or almost gone. She denies lightheadedness, chest pain, palpitations, urinary symptoms, or bowel symptoms. No other concerns were voiced at this time.     Allergies:  Novocain  Fosamax  Penicillins      Medications:    LISINOPRIL PO  AMLODIPINE BESYLATE PO  GABAPENTIN PO  Alendronate Sodium (FOSAMAX PO)  SERTRALINE HCL PO  HYDROcodone-acetaminophen (NORCO)  MG per tablet  metoprolol (TOPROL-XL) 25 MG 24 hr tablet  albuterol (PROAIR HFA) 108 (90 BASE) MCG/ACT inhaler  omeprazole (PRILOSEC) 20 MG capsule  citalopram (CELEXA) 40 MG tablet  Multiple Vitamins-Minerals (CENTRUM SILVER) per tablet  montelukast (SINGULAIR) 10 MG tablet     Past Medical History:    Essential hypertension  Asthma  Depression   Spinal stenosis     Past Surgical History:   Cataract surgery   Appendectomy   Hysterectomy   Cholecystectomy   Lumbar discectomy      Family History:   No family history on file.     Social History:  Marital Status:   Smoking status: Never smoker  Alcohol use: Yes   Resident of independent living facility      Review of Systems   Respiratory: Positive for cough, shortness of breath and wheezing.    Cardiovascular: Negative  "for chest pain and palpitations.   Gastrointestinal: Negative for diarrhea.   Genitourinary: Negative for dysuria and hematuria.   Neurological: Negative for light-headedness.   All other systems reviewed and are negative.    Physical Exam   First Vitals:  BP: (!) 177/117  Pulse: 131  Heart Rate: 131  Temp: 99.3  F (37.4  C)  Resp: 22  Height: 147.3 cm (4' 10\")  Weight: 47.6 kg (105 lb)  SpO2: 92 %    Physical Exam  SKIN:  Warm, dry.  HEMATOLOGIC/IMMUNOLOGIC/LYMPHATIC:  No pallor.  No limb edema.  HENT:  Moist oral mucosa.  Normal phonation.  No stridor.  No JVD.  EYES:  Conjunctivae normal.  CARDIOVASCULAR:  Tachycardic rate and irregularly irregular rhythm.  No murmur.  RESPIRATORY:  No respiratory distress, breath sounds equal and normal.  GASTROINTESTINAL:  Soft, nontender abdomen.  No mass or distension.  NEUROLOGIC:  Alert, conversant.  PSYCHIATRIC:  Normal mood.    Emergency Department Course     ECG:  ECG taken at 1113, ECG read at 1118  Sinus tachycardia  Left axis deviation  Inferior infarct, age undetermined  Abnormal ECG  Rate 120 bpm. IL interval 154. QRS duration 72. QT/QTc 326/460. P-R-T axes 27 -41 54.    Imaging:  Radiology findings were communicated with the patient who voiced understanding of the findings.    Chest xray 2 views:  Nothing acute. No interval change.   Reading per radiology    Laboratory:  Laboratory findings were communicated with the patient who voiced understanding of the findings.  CBC: WBC 7.7, HGB 12.9,    BMP: Glucose: 123(H), GFR: 54(L), Creatinine 0.97  UA: Urine ketone: 10(A), Urine blood: Trace(A), Protein Albumin: 100(A), Leukocyte Esterase: Moderate(A), WBC: 10(H), Bacteria: Moderate(A)    Blood culture: Pending    Interventions:  1145 Albuterol 5 mg Nebulization  1234 Tylenol 650 mg Oral  1316 Solu-medrol 125 mg IV    Emergency Department Course:  Nursing notes and vitals reviewed.  I performed an exam of the patient as documented above.   IV was inserted and " blood was drawn for laboratory testing, results above.  The patient was sent for a chest xray while in the emergency department, results above.     I discussed the treatment plan with the patient. They expressed understanding of this plan and consented to admission.     1300: I discussed the patient with Dr Prater, who will admit the patient to a monitored bed for further evaluation and treatment.    I personally reviewed the treatment plan with the Patient and answered all related questions prior to admission.    Impression & Plan      Medical Decision Making:  Laya Lutz is a 89 year old female who presents with increasing work of breathing, tachy arrhythmia in addition to what sounds like bronchitis, generalized weakness. Testing here was relatively unrevealing barring the atrial fibrillation with RVR which could be a function of her underlying illness, but no evidence of pneumonia. Does not seem clinically consistent with pulmonary embolism. She wanted to go home, but even with sitting her up in the bed she became nauseated, weak, and lightheaded so she will be admitted for further treatment and monitoring.     Diagnosis:    ICD-10-CM    1. Asthma exacerbation J45.901 Blood culture     Blood culture     Urine Culture Aerobic Bacterial   2. Atrial fibrillation with RVR (H) I48.91    3. General weakness R53.1    4. Essential hypertension I10        Disposition:   Admission under hospitalists service.    Scribe Disclosure:  I, Milan Xavier, am serving as a scribe at 10:34 AM on 5/22/2017 to document services personally performed by Tony Mcpherson MD, based on my observations and the provider's statements to me.   EMERGENCY DEPARTMENT       Tony Mcpherson MD  05/24/17 0800

## 2017-05-22 NOTE — PLAN OF CARE
Problem: Goal Outcome Summary  Goal: Goal Outcome Summary  Outcome: Improving  Pt arrived to unit from ED at 1400 r/t increased SOB. Pt A&O, forgetful. expiratory Wheezes, dyspnea, tachypnea. SOB. HR tachy. BLE dave. IVF infusing at 75ml/hr. Tele ST. Sepsis protocol initiated. Plan to collect sputum culture.

## 2017-05-23 ENCOUNTER — APPOINTMENT (OUTPATIENT)
Dept: OCCUPATIONAL THERAPY | Facility: CLINIC | Age: 82
DRG: 202 | End: 2017-05-23
Attending: HOSPITALIST
Payer: MEDICARE

## 2017-05-23 ENCOUNTER — APPOINTMENT (OUTPATIENT)
Dept: PHYSICAL THERAPY | Facility: CLINIC | Age: 82
DRG: 202 | End: 2017-05-23
Attending: HOSPITALIST
Payer: MEDICARE

## 2017-05-23 LAB
ANION GAP SERPL CALCULATED.3IONS-SCNC: 13 MMOL/L (ref 3–14)
BUN SERPL-MCNC: 26 MG/DL (ref 7–30)
CALCIUM SERPL-MCNC: 8.8 MG/DL (ref 8.5–10.1)
CHLORIDE SERPL-SCNC: 103 MMOL/L (ref 94–109)
CO2 SERPL-SCNC: 19 MMOL/L (ref 20–32)
CREAT SERPL-MCNC: 0.82 MG/DL (ref 0.52–1.04)
ERYTHROCYTE [DISTWIDTH] IN BLOOD BY AUTOMATED COUNT: 13.4 % (ref 10–15)
GFR SERPL CREATININE-BSD FRML MDRD: 66 ML/MIN/1.7M2
GLUCOSE SERPL-MCNC: 158 MG/DL (ref 70–99)
HCT VFR BLD AUTO: 35.8 % (ref 35–47)
HGB BLD-MCNC: 11.8 G/DL (ref 11.7–15.7)
MCH RBC QN AUTO: 30.8 PG (ref 26.5–33)
MCHC RBC AUTO-ENTMCNC: 33 G/DL (ref 31.5–36.5)
MCV RBC AUTO: 94 FL (ref 78–100)
PLATELET # BLD AUTO: 250 10E9/L (ref 150–450)
POTASSIUM SERPL-SCNC: 3.8 MMOL/L (ref 3.4–5.3)
RBC # BLD AUTO: 3.83 10E12/L (ref 3.8–5.2)
SODIUM SERPL-SCNC: 135 MMOL/L (ref 133–144)
WBC # BLD AUTO: 7 10E9/L (ref 4–11)

## 2017-05-23 PROCEDURE — 12000000 ZZH R&B MED SURG/OB

## 2017-05-23 PROCEDURE — 97530 THERAPEUTIC ACTIVITIES: CPT | Mod: GO | Performed by: OCCUPATIONAL THERAPIST

## 2017-05-23 PROCEDURE — 25000128 H RX IP 250 OP 636: Performed by: HOSPITALIST

## 2017-05-23 PROCEDURE — 97535 SELF CARE MNGMENT TRAINING: CPT | Mod: GO | Performed by: OCCUPATIONAL THERAPIST

## 2017-05-23 PROCEDURE — 40000193 ZZH STATISTIC PT WARD VISIT

## 2017-05-23 PROCEDURE — 94640 AIRWAY INHALATION TREATMENT: CPT

## 2017-05-23 PROCEDURE — 36415 COLL VENOUS BLD VENIPUNCTURE: CPT | Performed by: HOSPITALIST

## 2017-05-23 PROCEDURE — 25000125 ZZHC RX 250: Performed by: INTERNAL MEDICINE

## 2017-05-23 PROCEDURE — 40000133 ZZH STATISTIC OT WARD VISIT: Performed by: OCCUPATIONAL THERAPIST

## 2017-05-23 PROCEDURE — A9270 NON-COVERED ITEM OR SERVICE: HCPCS | Mod: GY | Performed by: HOSPITALIST

## 2017-05-23 PROCEDURE — 85027 COMPLETE CBC AUTOMATED: CPT | Performed by: HOSPITALIST

## 2017-05-23 PROCEDURE — 25000132 ZZH RX MED GY IP 250 OP 250 PS 637: Mod: GY | Performed by: HOSPITALIST

## 2017-05-23 PROCEDURE — 25000125 ZZHC RX 250: Performed by: HOSPITALIST

## 2017-05-23 PROCEDURE — 80048 BASIC METABOLIC PNL TOTAL CA: CPT | Performed by: HOSPITALIST

## 2017-05-23 PROCEDURE — 40000275 ZZH STATISTIC RCP TIME EA 10 MIN

## 2017-05-23 PROCEDURE — 99232 SBSQ HOSP IP/OBS MODERATE 35: CPT | Performed by: INTERNAL MEDICINE

## 2017-05-23 PROCEDURE — 97166 OT EVAL MOD COMPLEX 45 MIN: CPT | Mod: GO | Performed by: OCCUPATIONAL THERAPIST

## 2017-05-23 PROCEDURE — 97116 GAIT TRAINING THERAPY: CPT | Mod: GP

## 2017-05-23 PROCEDURE — 97161 PT EVAL LOW COMPLEX 20 MIN: CPT | Mod: GP

## 2017-05-23 PROCEDURE — 40000917 ZZH STATISTIC PEAK FLOW MEASUREMENT

## 2017-05-23 PROCEDURE — 94640 AIRWAY INHALATION TREATMENT: CPT | Mod: 76

## 2017-05-23 RX ORDER — ALBUTEROL SULFATE 0.83 MG/ML
3 SOLUTION RESPIRATORY (INHALATION) 4 TIMES DAILY
Status: DISCONTINUED | OUTPATIENT
Start: 2017-05-23 | End: 2017-05-24

## 2017-05-23 RX ORDER — PREDNISONE 20 MG/1
60 TABLET ORAL DAILY
Status: DISCONTINUED | OUTPATIENT
Start: 2017-05-24 | End: 2017-05-25 | Stop reason: HOSPADM

## 2017-05-23 RX ORDER — METOPROLOL TARTRATE 1 MG/ML
2.5 INJECTION, SOLUTION INTRAVENOUS EVERY 4 HOURS PRN
Status: DISCONTINUED | OUTPATIENT
Start: 2017-05-23 | End: 2017-05-25 | Stop reason: HOSPADM

## 2017-05-23 RX ADMIN — GABAPENTIN 900 MG: 300 CAPSULE ORAL at 21:25

## 2017-05-23 RX ADMIN — METHYLPREDNISOLONE SODIUM SUCCINATE 62.5 MG: 125 INJECTION, POWDER, FOR SOLUTION INTRAMUSCULAR; INTRAVENOUS at 00:00

## 2017-05-23 RX ADMIN — METHYLPREDNISOLONE SODIUM SUCCINATE 62.5 MG: 125 INJECTION, POWDER, FOR SOLUTION INTRAMUSCULAR; INTRAVENOUS at 13:12

## 2017-05-23 RX ADMIN — METOPROLOL TARTRATE 5 MG: 5 INJECTION INTRAVENOUS at 02:35

## 2017-05-23 RX ADMIN — OMEPRAZOLE 20 MG: 20 CAPSULE, DELAYED RELEASE ORAL at 06:20

## 2017-05-23 RX ADMIN — ACETAMINOPHEN 650 MG: 325 TABLET, FILM COATED ORAL at 16:46

## 2017-05-23 RX ADMIN — OMEPRAZOLE 20 MG: 20 CAPSULE, DELAYED RELEASE ORAL at 16:43

## 2017-05-23 RX ADMIN — ALBUTEROL SULFATE 2.5 MG: 2.5 SOLUTION RESPIRATORY (INHALATION) at 15:09

## 2017-05-23 RX ADMIN — CITALOPRAM HYDROBROMIDE 40 MG: 40 TABLET ORAL at 09:05

## 2017-05-23 RX ADMIN — METOPROLOL SUCCINATE 25 MG: 25 TABLET, EXTENDED RELEASE ORAL at 09:05

## 2017-05-23 RX ADMIN — SODIUM CHLORIDE: 9 INJECTION, SOLUTION INTRAVENOUS at 04:31

## 2017-05-23 RX ADMIN — ALBUTEROL SULFATE 2.5 MG: 2.5 SOLUTION RESPIRATORY (INHALATION) at 19:48

## 2017-05-23 RX ADMIN — ALBUTEROL SULFATE 2.5 MG: 2.5 SOLUTION RESPIRATORY (INHALATION) at 07:07

## 2017-05-23 RX ADMIN — ALBUTEROL SULFATE 2.5 MG: 2.5 SOLUTION RESPIRATORY (INHALATION) at 03:46

## 2017-05-23 RX ADMIN — AMLODIPINE BESYLATE 5 MG: 5 TABLET ORAL at 09:05

## 2017-05-23 RX ADMIN — SERTRALINE HYDROCHLORIDE 50 MG: 50 TABLET ORAL at 09:05

## 2017-05-23 RX ADMIN — LISINOPRIL 40 MG: 40 TABLET ORAL at 09:05

## 2017-05-23 RX ADMIN — ALBUTEROL SULFATE 2.5 MG: 2.5 SOLUTION RESPIRATORY (INHALATION) at 11:14

## 2017-05-23 RX ADMIN — MONTELUKAST SODIUM 10 MG: 10 TABLET, FILM COATED ORAL at 21:25

## 2017-05-23 RX ADMIN — CIPROFLOXACIN 400 MG: 2 INJECTION, SOLUTION INTRAVENOUS at 10:21

## 2017-05-23 ASSESSMENT — ACTIVITIES OF DAILY LIVING (ADL): PREVIOUS_RESPONSIBILITIES: MEAL PREP

## 2017-05-23 NOTE — PLAN OF CARE
Problem: Goal Outcome Summary  Goal: Goal Outcome Summary  Outcome: No Change  Pt A&O x3, forgetful & disoriented at times, pleasant. Tele SR. /96, tachy at times. O2 at 2 LNC, sats in low 90's. M[place] and SOB on exertion. Denied pain. Incontinent of bladder and used bathroom. Up with 1 walker/gait belt. Regular diet. IVF at 75 ml/hr.

## 2017-05-23 NOTE — PLAN OF CARE
Problem: Goal Outcome Summary  Goal: Goal Outcome Summary  Outcome: Improving  SBP 160s this AM, recheck this afternoon 130s, otherwise VSS. Tele SR. O2 weaned to RA w/ sats mid 90s, MOROCHO. Denies pain or discomfort. Alert, disoriented to situation, forgetful. Up w/ 1, walker, and GB. Plan to transition to PO steroid, continue IV abx. Continue to monitor.

## 2017-05-23 NOTE — PROGRESS NOTES
[facility]  Progress Note  [facility]    Visit per pt request.  I have met this pt before.  She seemed a little confused today.  Her main worry is that her daughter is mad at her for coming to the hospital.  I suggested that maybe daughter is worried and not mad.  Pt burst into tears and said she hadn't thought of that before.  That seemed to calm her fears.  OT came so we ended the visit with a short blessing.  No need to follow.      Aisha Villatoro  Chaplain Resident  Pager [phone]

## 2017-05-23 NOTE — PROGRESS NOTES
Pt on 2LPM NC with SpO2 in the low 90's. BBS diminished, neb TX given as schedule. Will continue to follow.  5/23/2017  Luz Galo

## 2017-05-23 NOTE — PLAN OF CARE
Problem: Goal Outcome Summary  Goal: Goal Outcome Summary  Outcome: No Change  A/O x3. Disoriented to situation and forgetful. Up with 1 walker/gb. Tele NS. PRN albuterol inhaler x1. VSS. Productive cough. LS course crackles. States pain in left leg is 9/10 from Rheumatoid arthritis. PRN Tylenol and Norco administered. Sputum culture showed many gram positive and negative cells.

## 2017-05-23 NOTE — PROGRESS NOTES
05/23/17 1149   Quick Adds   Type of Visit Initial Occupational Therapy Evaluation   Living Environment   Lives With alone   Living Arrangements assisted living   Home Accessibility no concerns;grab bars present (bathtub);tub/shower is not walk in   Transportation Available family or friend will provide   Self-Care   Dominant Hand right   Usual Activity Tolerance moderate   Current Activity Tolerance fair   Regular Exercise no   Equipment Currently Used at Home walker, rolling;bath bench;grab bar   Functional Level Prior   Ambulation 1-->assistive equipment   Transferring 1-->assistive equipment   Toileting 1-->assistive equipment   Bathing 3-->assistive equipment and person   Dressing 0-->independent   Eating 0-->independent   Communication 0-->understands/communicates without difficulty   Swallowing 0-->swallows foods/liquids without difficulty   Prior Functional Level Comment Pt has HHA to A with bating 2x/wk, Pt makes own breakfast and goes to DR for lunch and dinner. Dtr sets up medbox, and family As with grocery shopping. for breakfast pt has coffee cake, OJ, etc. Pts dtr does the cleaning and laundry and lives nearby.    General Information   Onset of Illness/Injury or Date of Surgery - Date 05/22/17   Referring Physician Mauro Prater MD   Patient/Family Goals Statement Appears open to rehab for a little while.    Additional Occupational Profile Info/Pertinent History of Current Problem The patient is a 89-year-old female with a past medical history of hypertension, atrial fibrillation, asthma, depression and spinal stenosis who presents with dyspnea, generalized weakness. Pt diagnosed with acute asthma exacerbation.    Precautions/Limitations fall precautions   Cognitive Status Examination   Orientation orientation to person, place and time   Level of Consciousness alert   Able to Follow Commands WNL/WFL   Memory impaired   Sensory Examination   Sensory Quick Adds No deficits were identified   Pain  Assessment   Patient Currently in Pain No  (pain to touch of L lateral lower LE )   Range of Motion (ROM)   ROM Comment B UE AROM WFL    Strength   Strength Comments B UE MMT 4-/5, elbows 4/5   Bed Mobility Skill: Sit to Supine   Level of [place]: Sit/Supine minimum assist (75% patients effort)   Physical Assist/Nonphysical Assist: Sit/Supine 1 person assist;verbal cues   Transfer Skill: Bed to Chair/Chair to Bed   Level of [place]: Bed to Chair contact guard   Physical Assist/Nonphysical Assist: Bed to Chair 1 person assist;verbal cues   Assistive Device - Transfer Skill Bed to Chair Chair to Bed Rehab Eval standard walker   Transfer Skill: Sit to Stand   Level of [place]: Sit/Stand minimum assist (75% patients effort)   Physical Assist/Nonphysical Assist: Sit/Stand 1 person assist;verbal cues   Assistive Device for Transfer: Sit/Stand standard walker   Transfer Skill: Toilet Transfer   Level of [place]: Toilet minimum assist (75% patients effort)   Physical Assist/Nonphysical Assist: Toilet 1 person assist;verbal cues   Assistive Device standard walker;grab bars   Lower Body Dressing   Level of [place]: Dress Lower Body contact guard   Physical Assist/Nonphysical Assist: Dress Lower Body 1 person assist;verbal cues   Toileting   Level of [place]: Toilet stand-by assist   Physical Assist/Nonphysical Assist: Toilet 1 person assist;verbal cues   Grooming   Level of [place]: Grooming stand-by assist   Physical Assist/Nonphysical Assist: Grooming 1 person assist;verbal cues   Eating/Self Feeding   Level of [place]: Eating independent   Instrumental Activities of Daily Living (IADL)   Previous Responsibilities meal prep  (dtr sets up meds in med box)   Activities of Daily Living Analysis   Impairments Contributing to Impaired Activities of Daily Living balance impaired;cognition impaired;pain;strength decreased  (decreased activity tolerance)   General Therapy Interventions  "  Planned Therapy Interventions ADL retraining;cognition;transfer training   Clinical Impression   Criteria for Skilled Therapeutic Interventions Met yes, treatment indicated   OT Diagnosis decreased ADL's and functionalmobility   Influenced by the following impairments impaired balance, safety, decreased activity tolerance, overall strength, L LE lower latera pain   Assessment of Occupational Performance 1-3 Performance Deficits   Identified Performance Deficits impaired independence with basic ADls' dressing, toielt transfer etc.   Clinical Decision Making (Complexity) Moderate complexity   Therapy Frequency daily   Predicted Duration of Therapy Intervention (days/wks) 3 days   Anticipated Discharge Disposition Transitional Care Facility;Home with Assist;Home with Home Therapy  (TCU, pending progress home OT )   Risks and Benefits of Treatment have been explained. Yes   Patient, Family & other staff in agreement with plan of care Yes   [facility] AM-PAC  \"6 Clicks\" Daily Activity Inpatient Short Form   1. Putting on and taking off regular lower body clothing? 3 - A Little   2. Bathing (including washing, rinsing, drying)? 3 - A Little   3. Toileting, which includes using toilet, bedpan or urinal? 3 - A Little   4. Putting on and taking off regular upper body clothing? 3 - A Little   5. Taking care of personal grooming such as brushing teeth? 3 - A Little   6. Eating meals? 4 - None   Daily Activity Raw Score (Score out of 24.Lower scores equate to lower levels of function) 19   Total Evaluation Time   Total Evaluation Time (Minutes) 10     "

## 2017-05-23 NOTE — PROGRESS NOTES
[facility]    Hospitalist Progress Note    Date of Service (when I saw the patient): 05/23/2017    Assessment & Plan   Laya Lutz is a 89 year-old female with a past medical history of hypertension, atrial fibrillation, asthma, depression and spinal stenosis who was admitted on 5/22/2017 with dyspnea, generalized weakness.     Asthma exacerbation secondary to acute bronchitis   Patient with recent URI-type symptoms, as well as several recent sick contacts with similar.   - Reports noticeable improvement since admission   - Transition to oral prednisone 60 mg daily 5/24/17  - Decrease scheduled albuterol to QID   - On antibiotics as below, suspect any respiratory infection likely viral in nature based on history    Atrial fibrillation with rapid ventricular response  Suspect secondary to #1. Not on anticoagulation prior to admission, CHADS2-VASc at least 4.   - Improved rate control. Continue prior to admission metoprolol XL. Metoprolol IV available PRN for severe HR elevations    Possible urinary tract infection versus asymptomatic bacteruria   Urinalysis on admission mildly abnormal with 10 WBC, moderate LE. Negative nitrite. Patient notes change in stream strength over past 1-2 weeks, but no clear infectious symptoms. >100k GNR noted on preliminary culture results.  - Continue ciprofloxacin pending final culture results     Physical deconditioning  Multifactorial in setting of above  - PT/OT consulted     Hypertension  Blood pressure trend improving.   - Hold HCTZ. Continue prior to admission lisinopril, metoprolol XL, amlodipine    Depression  Anxiety  Continue prior to admit sertraline and citalopram.     Non-anion gap metabolic acidosis  Suspect secondary to IVF.   - Discontinue IV fluids  - Monitor BMP    DVT Prophylaxis: Pneumatic Compression Devices  Code Status: DNR/DNI    Disposition: Expected discharge 5/24/17 if respiratory status continues to improve on oral steroids.     Ruben WOO  [facility]    Interval History   No acute events overnight. Reports she feels improved since admission, breathing currently feels about 75% of baseline. Denies any chest pain, n/v, abdominal pain.     -Data reviewed today: I reviewed all new labs and imaging results over the last 24 hours. I personally reviewed no images or EKG's today.    Physical Exam   Temp: 98.1  F (36.7  C) Temp src: Oral BP: 166/89 Pulse: 98 Heart Rate: 107 Resp: 16 SpO2: 94 % O2 Device: None (Room air) Oxygen Delivery: 2 LPM  Vitals:    05/22/17 1024 05/23/17 0652   Weight: 47.6 kg (105 lb) 48.7 kg (107 lb 5.8 oz)     Vital Signs with Ranges  Temp:  [97.8  F (36.6  C)-98.5  F (36.9  C)] 98.1  F (36.7  C)  Pulse:  [] 98  Heart Rate:  [] 107  Resp:  [16-28] 16  BP: (151-188)/() 166/89  SpO2:  [90 %-95 %] 94 %  I/O last 3 completed shifts:  In: 1430 [P.O.:120; I.V.:1310]  Out: -     Constitutional: Well-appearing, NAD  Respiratory: Clear to auscultation bilaterally, good air movement bilaterally  Cardiovascular: Irregularly irregular, normal rate  GI: Soft, non-tender, non-distended. BS normoactive.   Skin/Integumen: Warm, dry  Other:     Medications     Reason anticoagulant not prescribed for atrial fibrillation       NaCl 75 mL/hr at 05/23/17 0431       amLODIPine (NORVASC) tablet 5 mg  5 mg Oral Daily     citalopram  40 mg Oral Daily     gabapentin  900 mg Oral At Bedtime     lisinopril (PRINIVIL/ZESTRIL) tablet 40 mg  40 mg Oral Daily     metoprolol  25 mg Oral Daily     montelukast  10 mg Oral At Bedtime     omeprazole  20 mg Oral BID AC     sertraline (ZOLOFT) tablet 50 mg  50 mg Oral Daily     sodium chloride (PF)  3 mL Intracatheter Q8H     albuterol  3 mL Nebulization Q4H     methylPREDNISolone  62.5 mg Intravenous Q12H     metoprolol  5 mg Intravenous Q6H     ciprofloxacin  400 mg Intravenous Daily       Data     Recent Labs  Lab 05/23/17  0755 05/22/17 2012 05/22/17  1450 05/22/17  1045   WBC 7.0  --   --  7.7   HGB  11.8  --   --  12.9   MCV 94  --   --  93     --   --  286     --   --  136   POTASSIUM 3.8  --   --  3.8   CHLORIDE 103  --   --  101   CO2 19*  --   --  22   BUN 26  --   --  23   CR 0.82  --   --  0.97   ANIONGAP 13  --   --  13   NANCY 8.8  --   --  9.3   *  --   --  123*   TROPI  --  <0.015The 99th percentile for upper reference range is 0.045 ug/L.  Troponin values in the range of 0.045 - 0.120 ug/L may be associated with risks of adverse clinical events. <0.015The 99th percentile for upper reference range is 0.045 ug/L.  Troponin values in the range of 0.045 - 0.120 ug/L may be associated with risks of adverse clinical events.  --        No results found for this or any previous visit (from the past 24 hour(s)).

## 2017-05-23 NOTE — PLAN OF CARE
Problem: Goal Outcome Summary  Goal: Goal Outcome Summary  OT: eval completed and treatment initiated. Pt lives in an half-way apt, recently moved there, alone and reports independence with dressing, toileting transfer and has HHA assist with bathing, lunch and dinner provided and dtr A's with cleaning, laundry and med setup. Pt uses a ww for functional mobility. Pt limited due to decreased activity tolerance, overall strength, balance, safety. Pt currently requires JA for toilet transfer and CGA/SBA for basic ADLs for safety. Pt reports pain in L Lateral lower leg comes and goes. Noted SOB with min activity pt reports this is baseline for pt and O2 sats 94% on RA after activity. REcommend TCU at discharge, however, pending progress may be able to return home with cont'd A with IADL's and home OT for home safety eval.

## 2017-05-23 NOTE — PROGRESS NOTES
Pt on room air, has diminished BBS, and seems confused at times. Needs help with her IS, and only obtaining 100 on peak flow. Given nebs as scheduled, will continue to follow.

## 2017-05-23 NOTE — PROGRESS NOTES
05/23/17 1400   Quick Adds   Type of Visit Initial PT Evaluation   Living Environment   Lives With alone   Living Arrangements assisted living   Home Accessibility no concerns   Number of Stairs to Enter Home 0   Number of Stairs Within Home 0   Self-Care   Usual Activity Tolerance moderate   Current Activity Tolerance fair   Regular Exercise yes   Activity/Exercise Type walking   Equipment Currently Used at Home walker, rolling   Functional Level Prior   Ambulation 1-->assistive equipment   Transferring 1-->assistive equipment   Fall history within last six months no   Which of the above functional risks had a recent onset or change? none   General Information   Onset of Illness/Injury or Date of Surgery - Date 05/22/17   Referring Physician Mauro Prater MD   Patient/Family Goals Statement return home   Pertinent History of Current Problem (include personal factors and/or comorbidities that impact the POC) Admitted with weakness, dyspnea, asthma exacerbation, UTI and afib with RVR. PMH: asthma, HTN, depression, anxiety, afib.   Precautions/Limitations fall precautions   Weight-Bearing Status - LLE full weight-bearing   Weight-Bearing Status - RLE full weight-bearing   Cognitive Status Examination   Orientation orientation to person, place and time   Level of Consciousness alert   Follows Commands and Answers Questions 100% of the time;able to follow multistep instructions   Personal Safety and Judgment intact   Memory intact   Pain Assessment   Patient Currently in Pain No   Posture    Posture Protracted shoulders;Forward head position   Range of Motion (ROM)   ROM Quick Adds No deficits were identified   Strength   Strength Comments B hip flex 4/5, knee ext 4/5, DF 4/5   Bed Mobility   Bed Mobility Comments Supine to sit with SBA   Transfer Skills   Transfer Comments Sit to stand with SBA and FWW   Gait   Gait Comments Pt amb 10 ft with FWW and min A, dec in balance noted   Balance   Balance Comments Balance  "dec with gait   General Therapy Interventions   Planned Therapy Interventions balance training;gait training;neuromuscular re-education;strengthening;transfer training   Clinical Impression   Criteria for Skilled Therapeutic Intervention yes, treatment indicated   PT Diagnosis Difficulty ambulating   Influenced by the following impairments Dec strength, balance, activity tolerance, SOB   Functional limitations due to impairments Difficulty ambulating and transferring   Clinical Presentation Stable/Uncomplicated   Clinical Presentation Rationale medically stable   Clinical Decision Making (Complexity) Low complexity   Therapy Frequency` daily   Predicted Duration of Therapy Intervention (days/wks) 3 days   Anticipated Discharge Disposition Transitional Care Facility   Risk & Benefits of therapy have been explained Yes   Patient, Family & other staff in agreement with plan of care Yes   [facility] TM \"6 Clicks\"   2016, Trustees of [facility], under license to [employer].  All rights reserved.   6 Clicks Short Forms Basic Mobility Inpatient Short Form   [facility]  \"6 Clicks\" V.2 Basic Mobility Inpatient Short Form   1. Turning from your back to your side while in a flat bed without using bedrails? 4 - None   2. Moving from lying on your back to sitting on the side of a flat bed without using bedrails? 3 - A Little   3. Moving to and from a bed to a chair (including a wheelchair)? 3 - A Little   4. Standing up from a chair using your arms (e.g., wheelchair, or bedside chair)? 3 - A Little   5. To walk in hospital room? 3 - A Little   6. Climbing 3-5 steps with a railing? 2 - A Lot   Basic Mobility Raw Score (Score out of 24.Lower scores equate to lower levels of function) 18   Total Evaluation Time   Total Evaluation Time (Minutes) 15     "

## 2017-05-23 NOTE — PLAN OF CARE
"Problem: Goal Outcome Summary  Goal: Goal Outcome Summary  PT: Orders received, eval completed, treatment initiated. Pt admitted with SOB, asthma exacerbation, UTI and afib with RVR. Prior to admit pt was living in an DEEPAK, uses a 4ww, independent with mobility. Pt is active walking daily in her building. Currently requires SBA for supine to sit, SBA sit to stand with FWW, min A for gait of 75 ft with FWW on RA with sats 93% pre and 90% post with complaints of moderate SOB. Pt was not at baseline with gait, states she \"couldn't coordinate my legs.\"  Pt demonstrates dec strength, balance, activity tolerance, SOB and difficulty ambulating and transferring and would benefit from skilled PT services in order to improve this. Recommend discharge to TCU at this time. Barriers to returning home include dec balance with gait, falls risk, needs assist for safety. Pt in agreement with this if discharge is soon.          "

## 2017-05-24 ENCOUNTER — APPOINTMENT (OUTPATIENT)
Dept: OCCUPATIONAL THERAPY | Facility: CLINIC | Age: 82
DRG: 202 | End: 2017-05-24
Payer: MEDICARE

## 2017-05-24 ENCOUNTER — APPOINTMENT (OUTPATIENT)
Dept: PHYSICAL THERAPY | Facility: CLINIC | Age: 82
DRG: 202 | End: 2017-05-24
Payer: MEDICARE

## 2017-05-24 LAB
ANION GAP SERPL CALCULATED.3IONS-SCNC: 10 MMOL/L (ref 3–14)
BACTERIA SPEC CULT: NORMAL
BUN SERPL-MCNC: 26 MG/DL (ref 7–30)
CALCIUM SERPL-MCNC: 8.6 MG/DL (ref 8.5–10.1)
CHLORIDE SERPL-SCNC: 104 MMOL/L (ref 94–109)
CO2 SERPL-SCNC: 25 MMOL/L (ref 20–32)
CREAT SERPL-MCNC: 0.8 MG/DL (ref 0.52–1.04)
GFR SERPL CREATININE-BSD FRML MDRD: 67 ML/MIN/1.7M2
GLUCOSE SERPL-MCNC: 104 MG/DL (ref 70–99)
MICRO REPORT STATUS: NORMAL
POTASSIUM SERPL-SCNC: 3.6 MMOL/L (ref 3.4–5.3)
SODIUM SERPL-SCNC: 139 MMOL/L (ref 133–144)
SPECIMEN SOURCE: NORMAL

## 2017-05-24 PROCEDURE — 25000132 ZZH RX MED GY IP 250 OP 250 PS 637: Mod: GY | Performed by: HOSPITALIST

## 2017-05-24 PROCEDURE — 25000128 H RX IP 250 OP 636: Performed by: HOSPITALIST

## 2017-05-24 PROCEDURE — 99232 SBSQ HOSP IP/OBS MODERATE 35: CPT | Performed by: INTERNAL MEDICINE

## 2017-05-24 PROCEDURE — 94640 AIRWAY INHALATION TREATMENT: CPT

## 2017-05-24 PROCEDURE — 36415 COLL VENOUS BLD VENIPUNCTURE: CPT | Performed by: INTERNAL MEDICINE

## 2017-05-24 PROCEDURE — A9270 NON-COVERED ITEM OR SERVICE: HCPCS | Mod: GY | Performed by: HOSPITALIST

## 2017-05-24 PROCEDURE — 97116 GAIT TRAINING THERAPY: CPT | Mod: GP

## 2017-05-24 PROCEDURE — 40000133 ZZH STATISTIC OT WARD VISIT: Performed by: OCCUPATIONAL THERAPY ASSISTANT

## 2017-05-24 PROCEDURE — 97110 THERAPEUTIC EXERCISES: CPT | Mod: GP

## 2017-05-24 PROCEDURE — 25000125 ZZHC RX 250: Performed by: HOSPITALIST

## 2017-05-24 PROCEDURE — 97535 SELF CARE MNGMENT TRAINING: CPT | Mod: GO | Performed by: OCCUPATIONAL THERAPY ASSISTANT

## 2017-05-24 PROCEDURE — 80048 BASIC METABOLIC PNL TOTAL CA: CPT | Performed by: INTERNAL MEDICINE

## 2017-05-24 PROCEDURE — 12000000 ZZH R&B MED SURG/OB

## 2017-05-24 PROCEDURE — 97530 THERAPEUTIC ACTIVITIES: CPT | Mod: GO | Performed by: OCCUPATIONAL THERAPY ASSISTANT

## 2017-05-24 PROCEDURE — 25000125 ZZHC RX 250: Performed by: INTERNAL MEDICINE

## 2017-05-24 PROCEDURE — A9270 NON-COVERED ITEM OR SERVICE: HCPCS | Mod: GY | Performed by: INTERNAL MEDICINE

## 2017-05-24 PROCEDURE — 40000193 ZZH STATISTIC PT WARD VISIT

## 2017-05-24 PROCEDURE — 25000132 ZZH RX MED GY IP 250 OP 250 PS 637: Mod: GY | Performed by: INTERNAL MEDICINE

## 2017-05-24 PROCEDURE — 40000275 ZZH STATISTIC RCP TIME EA 10 MIN

## 2017-05-24 RX ORDER — ALBUTEROL SULFATE 90 UG/1
2 AEROSOL, METERED RESPIRATORY (INHALATION) EVERY 4 HOURS PRN
Status: DISCONTINUED | OUTPATIENT
Start: 2017-05-24 | End: 2017-05-25 | Stop reason: HOSPADM

## 2017-05-24 RX ADMIN — PREDNISONE 60 MG: 20 TABLET ORAL at 08:48

## 2017-05-24 RX ADMIN — ALBUTEROL SULFATE 2.5 MG: 2.5 SOLUTION RESPIRATORY (INHALATION) at 07:22

## 2017-05-24 RX ADMIN — ALBUTEROL SULFATE 2 PUFF: 90 AEROSOL, METERED RESPIRATORY (INHALATION) at 22:11

## 2017-05-24 RX ADMIN — ALBUTEROL SULFATE 2 PUFF: 90 AEROSOL, METERED RESPIRATORY (INHALATION) at 18:16

## 2017-05-24 RX ADMIN — AMLODIPINE BESYLATE 5 MG: 5 TABLET ORAL at 08:48

## 2017-05-24 RX ADMIN — CITALOPRAM HYDROBROMIDE 40 MG: 40 TABLET ORAL at 08:48

## 2017-05-24 RX ADMIN — BUDESONIDE 1 PUFF: 180 AEROSOL, POWDER RESPIRATORY (INHALATION) at 11:12

## 2017-05-24 RX ADMIN — METOPROLOL SUCCINATE 25 MG: 25 TABLET, EXTENDED RELEASE ORAL at 08:49

## 2017-05-24 RX ADMIN — OMEPRAZOLE 20 MG: 20 CAPSULE, DELAYED RELEASE ORAL at 15:55

## 2017-05-24 RX ADMIN — CIPROFLOXACIN 400 MG: 2 INJECTION, SOLUTION INTRAVENOUS at 08:55

## 2017-05-24 RX ADMIN — ALBUTEROL SULFATE 2.5 MG: 2.5 SOLUTION RESPIRATORY (INHALATION) at 01:13

## 2017-05-24 RX ADMIN — GABAPENTIN 900 MG: 300 CAPSULE ORAL at 21:29

## 2017-05-24 RX ADMIN — SERTRALINE HYDROCHLORIDE 50 MG: 50 TABLET ORAL at 08:48

## 2017-05-24 RX ADMIN — MONTELUKAST SODIUM 10 MG: 10 TABLET, FILM COATED ORAL at 21:29

## 2017-05-24 RX ADMIN — BUDESONIDE 1 PUFF: 180 AEROSOL, POWDER RESPIRATORY (INHALATION) at 21:29

## 2017-05-24 RX ADMIN — LISINOPRIL 40 MG: 40 TABLET ORAL at 08:49

## 2017-05-24 RX ADMIN — OMEPRAZOLE 20 MG: 20 CAPSULE, DELAYED RELEASE ORAL at 06:49

## 2017-05-24 NOTE — PLAN OF CARE
Problem: Goal Outcome Summary  Goal: Goal Outcome Summary  Outcome: No Change  Pt A&O, forgetful. Tele SR. VSS, except tachy at times. Refused O2NC, sats in low 90's on RA. [place] and SOB on exertion. PRN neb given.  Denied pain. Incontinent of bladder at times and used bathroom. Up with 1 walker/gait belt. Regular diet.

## 2017-05-24 NOTE — PLAN OF CARE
Problem: Goal Outcome Summary  Goal: Goal Outcome Summary  Outcome: Improving  VSS. A&O x4, forgetful.  Up with 1, GB and walker. Tolerating RD. RA. Tele NSR. Incontinent at times. Denies pain. Plans to discharge to TCU.  tomorrow.

## 2017-05-24 NOTE — PROGRESS NOTES
Care Transition Initial Assessment -   Reason For Consult: discharge planning, facility placement  Met with: PATIENT    Active Problems:    Asthma exacerbation         DATA  Lives With: alone  Living Arrangements: [facility]  Description of Support System: Involved. Per pt, her daughter Etta and granddaughter Shelli check on her daily. Etta sets up her medications. She has two meals per day in the dining room. Does not use home O2.  Who is your support system?: Children  Identified issues/concerns regarding health management: PT/OT recommends TCU. Pt is in agreement. She would like to go to [facility] TCU.      Quality Of Family Relationships: involved  Transportation Available: family or friend will provide    ASSESSMENT  Cognitive Status:  Appears alert and oriented.   Concerns to be addressed: On-going DC planning.     PLAN  Financial costs for the patient includes: None at this time.  Patient given options and choices for discharge: Yes.  Patient/family is agreeable to the plan?  YES  Patient Goals and Preferences: DC to TCU.  Patient anticipates discharging to:  TCU.    MATTY Colindres  [phone]

## 2017-05-24 NOTE — PLAN OF CARE
Problem: Goal Outcome Summary  Goal: Goal Outcome Summary  OT: Per plan established by the Occupational Therapist, the recommended discharge location is TCU to maximize safe and independence with completing ADLS/IADLS.  Pt CGA with cues for hand placement sit to stands, amb with FWW CGA and cues for walker safety with proximity of walker to self during mobility.  Toilet transfer Ashvin. Completed cognitive assessment with pt score of 22/30 indicates mild cognitive deficits, deficits noted in STM and executive functions.  Pt on RA with O2 91-93% post activity.

## 2017-05-24 NOTE — PLAN OF CARE
Problem: Goal Outcome Summary  Goal: Goal Outcome Summary  PT: Patient states she is feeling better but not back to her baseline mobility. Required CGA and 3 tries to stand from the chair, CGA for gait of 120 ft with FWW on 2 L with sats 95% post gait with complaints of only mild SOB with dec in balance noted at times. Participated well in strengthening activities with rest breaks. Recommend discharge to TCU, pt in agreement.

## 2017-05-24 NOTE — PROGRESS NOTES
[facility]    Hospitalist Progress Note    Date of Service (when I saw the patient): 05/24/2017    Assessment & Plan   Laya Lutz is a 89 year-old female with a past medical history of hypertension, atrial fibrillation, asthma, depression and spinal stenosis who was admitted on 5/22/2017 with dyspnea, generalized weakness.     Asthma exacerbation secondary to acute bronchitis   Patient with recent URI-type symptoms, as well as several recent sick contacts with similar.   - Reports noticeable improvement since admission, now feeling that her dyspnea is at baseline  - Continue oral prednisone 60 mg daily (started 5/24/17)  - Change from scheduled albuterol nebs to PRN albuterol inhl given improvement  - Suspect any respiratory infection likely viral in nature based on history, w/o underlying COPD, therefore will complete antibiotic course 5/24/2017 as below   - She reports she typically uses her albuterol inhaler a few times a day at baseline. She is not presently on any controller medications, reports she had been in the past however, with no specific reason why she is not now. Will start budesonide inhl, with teaching with first dose.   - Continue PTA montelukast     Atrial fibrillation with rapid ventricular response  Suspect secondary to #1. Not on anticoagulation prior to admission, CHADS2-VASc at least 4.   - Improved rate control. Continue prior to admission metoprolol XL. Metoprolol IV available PRN for severe HR elevations    Possible urinary tract infection versus asymptomatic bacteruria   Urinalysis on admission mildly abnormal with 10 WBC, moderate LE. Negative nitrite. Patient notes change in stream strength over past 1-2 weeks, but no clear infectious symptoms. >100k E coli + >100k GNR noted on preliminary culture results.  - Will complete 3 day course of ciprofloxacin 5/24/2017    Physical deconditioning  Multifactorial in setting of above  - PT/OT consulted. Currently recommending  TCU, which patient is open to but reluctant and prefers discharge to [facility] if at all possible. Reassess 5/24/2017     Hypertension  - Blood pressure mildly elevated, steroids are likely contributing. Continue prior to admission lisinopril, metoprolol XL, amlodipine    Depression  Anxiety  Continue prior to admit sertraline and citalopram.     Non-anion gap metabolic acidosis  Suspect secondary to IVF.   - Normalized with fluids discontinued     DVT Prophylaxis: Pneumatic Compression Devices  Code Status: DNR/DNI    Disposition: Possible discharge this afternoon if weaned from oxygen, pending therapy reassessments on discharge location.      Ruben WOO Adeola    Interval History   No acute events overnight. Breathing continues to feel improved, she now feels she is at her baseline. Denies any urinary symptoms. Denies any chest pain, n/v, abdominal pain.     -Data reviewed today: I reviewed all new labs and imaging results over the last 24 hours. I personally reviewed no images or EKG's today.    Physical Exam   Temp: 97.9  F (36.6  C) Temp src: Oral BP: 148/82 Pulse: 90 Heart Rate: 85 Resp: 16 SpO2: 93 % O2 Device: Nasal cannula Oxygen Delivery: 2 LPM  Vitals:    05/22/17 1024 05/23/17 0652 05/24/17 0538   Weight: 47.6 kg (105 lb) 48.7 kg (107 lb 5.8 oz) 51.4 kg (113 lb 4.8 oz)     Vital Signs with Ranges  Temp:  [97.3  F (36.3  C)-97.9  F (36.6  C)] 97.9  F (36.6  C)  Pulse:  [90] 90  Heart Rate:  [] 85  Resp:  [15-18] 16  BP: (139-160)/(73-88) 148/82  SpO2:  [88 %-95 %] 93 %  I/O last 3 completed shifts:  In: 360 [P.O.:360]  Out: -     Constitutional: Well-appearing, NAD  Respiratory: Clear to auscultation bilaterally, no wheezes, good air movement bilaterally  Cardiovascular: Irregularly irregular, normal rate  GI: Soft, non-tender, non-distended. BS normoactive.   Skin/Integumen: Warm, dry  Other:     Medications     Reason anticoagulant not prescribed for atrial fibrillation         budesonide  1 puff  Inhalation BID     predniSONE  60 mg Oral Daily     amLODIPine (NORVASC) tablet 5 mg  5 mg Oral Daily     citalopram  40 mg Oral Daily     gabapentin  900 mg Oral At Bedtime     lisinopril (PRINIVIL/ZESTRIL) tablet 40 mg  40 mg Oral Daily     metoprolol  25 mg Oral Daily     montelukast  10 mg Oral At Bedtime     omeprazole  20 mg Oral BID AC     sertraline (ZOLOFT) tablet 50 mg  50 mg Oral Daily     sodium chloride (PF)  3 mL Intracatheter Q8H       Data     Recent Labs  Lab 05/24/17  0755 05/23/17  0755 05/22/17 2012 05/22/17  1450 05/22/17  1045   WBC  --  7.0  --   --  7.7   HGB  --  11.8  --   --  12.9   MCV  --  94  --   --  93   PLT  --  250  --   --  286    135  --   --  136   POTASSIUM 3.6 3.8  --   --  3.8   CHLORIDE 104 103  --   --  101   CO2 25 19*  --   --  22   BUN 26 26  --   --  23   CR 0.80 0.82  --   --  0.97   ANIONGAP 10 13  --   --  13   NANCY 8.6 8.8  --   --  9.3   * 158*  --   --  123*   TROPI  --   --  <0.015The 99th percentile for upper reference range is 0.045 ug/L.  Troponin values in the range of 0.045 - 0.120 ug/L may be associated with risks of adverse clinical events. <0.015The 99th percentile for upper reference range is 0.045 ug/L.  Troponin values in the range of 0.045 - 0.120 ug/L may be associated with risks of adverse clinical events.  --        No results found for this or any previous visit (from the past 24 hour(s)).

## 2017-05-24 NOTE — PLAN OF CARE
Problem: Goal Outcome Summary  Goal: Goal Outcome Summary  Outcome: Improving  A&O with forgetfulness, pleasant, calls appropriately, up with SBA. VSS, placed on 2L NC at bedtime, sats 88% on RA while asleep. , LS dim/exp wheezes. Inc at times. Tolerating diet. Tylenol given for mild headache, improved. Continue to monitor.

## 2017-05-25 VITALS
OXYGEN SATURATION: 94 % | SYSTOLIC BLOOD PRESSURE: 147 MMHG | HEIGHT: 58 IN | TEMPERATURE: 97.4 F | RESPIRATION RATE: 18 BRPM | WEIGHT: 108.47 LBS | BODY MASS INDEX: 22.77 KG/M2 | DIASTOLIC BLOOD PRESSURE: 95 MMHG | HEART RATE: 92 BPM

## 2017-05-25 LAB
BACTERIA SPEC CULT: ABNORMAL
Lab: ABNORMAL
MICRO REPORT STATUS: ABNORMAL
MICROORGANISM SPEC CULT: ABNORMAL
MICROORGANISM SPEC CULT: ABNORMAL
SPECIMEN SOURCE: ABNORMAL

## 2017-05-25 PROCEDURE — 99239 HOSP IP/OBS DSCHRG MGMT >30: CPT | Performed by: INTERNAL MEDICINE

## 2017-05-25 PROCEDURE — 25000132 ZZH RX MED GY IP 250 OP 250 PS 637: Mod: GY | Performed by: HOSPITALIST

## 2017-05-25 PROCEDURE — 25000125 ZZHC RX 250: Performed by: HOSPITALIST

## 2017-05-25 PROCEDURE — A9270 NON-COVERED ITEM OR SERVICE: HCPCS | Mod: GY | Performed by: HOSPITALIST

## 2017-05-25 PROCEDURE — 94640 AIRWAY INHALATION TREATMENT: CPT

## 2017-05-25 PROCEDURE — 40000275 ZZH STATISTIC RCP TIME EA 10 MIN

## 2017-05-25 PROCEDURE — 25000125 ZZHC RX 250: Performed by: INTERNAL MEDICINE

## 2017-05-25 RX ORDER — PREDNISONE 10 MG/1
TABLET ORAL
DISCHARGE
Start: 2017-05-25 | End: 2019-03-11

## 2017-05-25 RX ADMIN — PREDNISONE 60 MG: 20 TABLET ORAL at 08:15

## 2017-05-25 RX ADMIN — CITALOPRAM HYDROBROMIDE 40 MG: 40 TABLET ORAL at 08:15

## 2017-05-25 RX ADMIN — SERTRALINE HYDROCHLORIDE 50 MG: 50 TABLET ORAL at 08:15

## 2017-05-25 RX ADMIN — AMLODIPINE BESYLATE 5 MG: 5 TABLET ORAL at 08:15

## 2017-05-25 RX ADMIN — ALBUTEROL SULFATE 2 PUFF: 90 AEROSOL, METERED RESPIRATORY (INHALATION) at 10:53

## 2017-05-25 RX ADMIN — BUDESONIDE 1 PUFF: 180 AEROSOL, POWDER RESPIRATORY (INHALATION) at 08:16

## 2017-05-25 RX ADMIN — METOPROLOL SUCCINATE 25 MG: 25 TABLET, EXTENDED RELEASE ORAL at 08:15

## 2017-05-25 RX ADMIN — OMEPRAZOLE 20 MG: 20 CAPSULE, DELAYED RELEASE ORAL at 06:11

## 2017-05-25 RX ADMIN — ALBUTEROL SULFATE 2.5 MG: 2.5 SOLUTION RESPIRATORY (INHALATION) at 11:03

## 2017-05-25 RX ADMIN — LISINOPRIL 40 MG: 40 TABLET ORAL at 08:14

## 2017-05-25 NOTE — PLAN OF CARE
Problem: Goal Outcome Summary  Goal: Goal Outcome Summary  PT: Pt discharged to TCU prior to being seen by PT 5/25.     PT goals not met.

## 2017-05-25 NOTE — PROGRESS NOTES
D: SW following for DC planning.   I: Pt will DC today to [facility] TCU via [facility] at 1315. Pt informed she will get a bill for this ride. Orders faxed and facility updated. SW left a voicemail for pt's dtr Etta about plan.  P: DC today.    Rosie Valerio, LSW  [id number]    PAS-RR    D: Per DHS regulation, SW completed and submitted PAS-RR to [place] [employer] Direct Connect via the Senior [employer] Line.  PAS-RR confirmation # is : [id number]    I: SW spoke with pt and they are aware a PAS-RR has been submitted.  SW reviewed with pt that they may be contacted for a follow up appointment within 10 days of hospital discharge if their SNF stay is < 30 days.  Contact information for [employer] was also provided.    A: Pt verbalized understanding.    P: Further questions may be directed to Senior [employer] Line at #[phone], option #4 for PAS-RR staff.

## 2017-05-25 NOTE — PLAN OF CARE
Problem: Goal Outcome Summary  Goal: Goal Outcome Summary  Outcome: Adequate for Discharge Date Met:  05/25/17  given belongings and d/c to tcu  130 pm, patient was pain free at the time of d/c.

## 2017-05-25 NOTE — PLAN OF CARE
Problem: Goal Outcome Summary  Goal: Goal Outcome Summary  Outcome: No Change  A&O x4.  VSS, O2 sats in low 90's on room air.  Tele NSR.  Assist of 1 with gait belt and walker.  Regular diet, tolerated well.  Received PRN Albuterol inhaler x2 with immediate relief.  Denied pain.  Plan to d/c to TCU tomorrow.

## 2017-05-25 NOTE — PLAN OF CARE
Problem: Goal Outcome Summary  Goal: Goal Outcome Summary  Outcome: No Change  A&Ox4. Forgetful at times. VSS on RA. Sats remained stable above 90s% on RA. Noted MOROCHO, ambulating to bathroom.  L/s diminished, expiratory wheezing. Tele NSR. Up assist of one w walker & gait-belt to bathroom. Incontinent of urine at times. Denies pain. Plan is to discharge to TCU today. Will continue to monitor.

## 2017-05-25 NOTE — PLAN OF CARE
Problem: Goal Outcome Summary  Goal: Goal Outcome Summary  Occupational Therapy Discharge Summary     Reason for therapy discharge:    Discharged to transitional care facility.     Progress towards therapy goal(s). See goals on Care Plan in [facility] electronic health record for goal details.  Goals not met.  Barriers to achieving goals:   discharge from facility.     Therapy recommendation(s):    Continued therapy is recommended.  Rationale/Recommendations:  To maximize I in ADL/IADL.

## 2017-05-25 NOTE — DISCHARGE SUMMARY
[facility]    Discharge Summary  Hospitalist    Date of Admission:  5/22/2017  Date of Discharge:  5/25/2017  Discharging Provider: Ruben Mir  Date of Service (when I saw the patient): 05/25/17    Discharge Diagnoses   Asthma exacerbation secondary to acute viral bronchitis   Atrial fibrillation with rapid ventricular response  Possible uncomplicated urinary tract infection versus asymptomatic bacteruria   Physical deconditioning  Hypertension  Depression  Anxiety  Non-anion gap metabolic acidosis    History of Present Illness   Laya Lutz is an 89 year old female who presented with dyspnea, generalized weakness.    Hospital Course   Laya Lutz was admitted on 5/22/2017.  The following problems were addressed during her hospitalization:     Asthma exacerbation secondary to acute bronchitis   Presented with dyspnea and generalized weakness. Patient with recent URI-type symptoms, as well as several recent sick contacts with similar symptoms. Started on steroids with significant improvement. Discharged with rapid prednisone taper given her rapid improvement. Suspect any respiratory infection likely viral in nature based on history, without underlying COPD, therefore completed only antibiotics below. She reports she typically uses her albuterol inhaler a few times a day at baseline. She is not presently on any controller medications, reports she had been in the past however, with no specific reason why she is not now. Started budesonide inhl, with teaching with first dose.  Continue prior to admission montelukast.     Atrial fibrillation with rapid ventricular response  Suspect secondary to above. CHADS2-VASc at least 4, not on anticoagulation prior to admission, unclear context for this but would continue to defer to her outpatient providers. Improved rate control following improvement in respiratory status. Continues on prior to admission metoprolol XL.      Possible urinary tract  infection versus asymptomatic bacteruria   Urinalysis on admission mildly abnormal with 10 WBC, moderate LE. Negative nitrite. Patient notes change in stream strength over past 1-2 weeks, but no clear infectious symptoms. >100k E coli + >100k Klebsiella pneumoniae noted on culture results. Completed 3 day course of ciprofloxacin.     Physical deconditioning  Multifactorial in setting of above. PT/OT consulted, recommended TCU. Discharged to TCU for ongoing therapies.      Hypertension  Blood pressure mildly elevated, steroids are likely contributing. Continue prior to admission lisinopril, metoprolol XL, amlodipine.     Depression  Anxiety  Continue prior to admit sertraline and citalopram. Note she is on two SSRIs and has been so for some time it appears per chart review. Unclear circumstances for this. Seems to be tolerating well at this point, but would recommend she follow-up with her primary care provider after discharge to TCU to review if this is still appropriate.      Non-anion gap metabolic acidosis  Suspect secondary to IVF. Normalized with fluids discontinued.     Ruben Mir    Pending Results   These results will be followed up by hospitalist   Unresulted Labs Ordered in the Past 30 Days of this Admission     Date and Time Order Name Status Description    5/22/2017 1041 Blood culture Preliminary     5/22/2017 1041 Blood culture Preliminary           Code Status   Full Code       Primary Care Physician   Dayton Huston    Physical Exam   Temp: 97.4  F (36.3  C) Temp src: Oral BP: (!) 147/95 Pulse: 92 Heart Rate: 87 Resp: 18 SpO2: 94 % O2 Device: None (Room air)    Vitals:    05/23/17 0652 05/24/17 0538 05/25/17 0610   Weight: 48.7 kg (107 lb 5.8 oz) 51.4 kg (113 lb 4.8 oz) 49.2 kg (108 lb 7.5 oz)     Vital Signs with Ranges  Temp:  [97.4  F (36.3  C)-97.8  F (36.6  C)] 97.4  F (36.3  C)  Pulse:  [92] 92  Heart Rate:  [87-92] 87  Resp:  [16-20] 18  BP: (131-149)/(80-95) 147/95  SpO2:   [90 %-94 %] 94 %       Constitutional: Well-appearing, NAD  Respiratory:  Clear to auscultation bilaterally, no wheezes, good air movement bilaterally  Cardiovascular: Irregularly irregular, normal rate  GI: Soft, non-tender, non-distended. BS normoactive.   Skin/Integumen: Warm, dry  Other:     Discharge Disposition   Discharged to transitional care unit   Condition at discharge: Stable    Consultations This Hospital Stay   RESPIRATORY CARE IP CONSULT  PHYSICAL THERAPY ADULT IP CONSULT  OCCUPATIONAL THERAPY ADULT IP CONSULT  SOCIAL WORK IP CONSULT  OCCUPATIONAL THERAPY ADULT IP CONSULT  PHYSICAL THERAPY ADULT IP CONSULT    Time Spent on this Encounter   IRuben, personally saw the patient today and spent 35 minutes discharging this patient.    Discharge Orders     General info for SNF   Length of Stay Estimate: Short Term Care: Estimated # of Days <30  Condition at Discharge: Stable  Level of care:skilled   Rehabilitation Potential: Good  Admission H&P remains valid and up-to-date: Yes  Recent Chemotherapy: N/A  Use Nursing Home Standing Orders: Yes     Mantoux instructions   Give two-step Mantoux (PPD) Per Facility Policy Yes     Reason for your hospital stay   You were hospitalized for an exacerbation of asthma.     Follow Up and recommended labs and tests   Follow up with Nursing home physician.  No follow up labs or test are needed. Follow up with primary care provider after discharge from TCU to address long term use of two SSRIs.     Activity - Up with nursing assistance     DNR/DNI     Occupational Therapy Adult Consult   Evaluate and treat as clinically indicated.    Reason:  Physical deconditioning     Physical Therapy Adult Consult   Evaluate and treat as clinically indicated.    Reason:  Physical deconditioning     Advance Diet as Tolerated   Follow this diet upon discharge: Regular diet       Discharge Medications   Current Discharge Medication List      START taking these medications     Details   budesonide (PULMICORT FLEXHALER) 180 MCG/ACT inhaler Inhale 1 puff into the lungs 2 times daily    Associated Diagnoses: Mild intermittent asthma with acute exacerbation      predniSONE (DELTASONE) 10 MG tablet Take 50 mg for 2 days starting 5/26/17, then 40 mg for 2 days, then 30 mg for 2 days, then 20 mg for 2 days, then 10 mg for 2 days, then stop.    Associated Diagnoses: Asthma exacerbation         CONTINUE these medications which have NOT CHANGED    Details   acetaminophen (TYLENOL) 325 MG tablet Take 2 tablets (650 mg) by mouth every 4 hours as needed for mild pain  Qty: 100 tablet    Associated Diagnoses: Lumbar disc disease with radiculopathy      LISINOPRIL PO Take 40 mg by mouth daily      AMLODIPINE BESYLATE PO Take 5 mg by mouth daily      GABAPENTIN PO Take 900 mg by mouth At Bedtime (takes 3 x 300 mg caps)      Alendronate Sodium (FOSAMAX PO) Take 70 mg by mouth once a week      SERTRALINE HCL PO Take 50 mg by mouth daily      metoprolol (TOPROL-XL) 25 MG 24 hr tablet Take 1 tablet by mouth daily.  Qty: 90 tablet    Associated Diagnoses: Elevated troponin      albuterol (PROAIR HFA) 108 (90 BASE) MCG/ACT inhaler INHALE 2 PUFFS BY MOUTH EVERY 4 TO 6 HOURS AS NEEDED FOR WHEEZE  Qty: 2 Inhaler, Refills: 4    Associated Diagnoses: Intermittent asthma      omeprazole (PRILOSEC) 20 MG capsule TAKE ONE CAPSULE BY MOUTH DAILY 30 MINUTES BEFORE BREAKFAST  Qty: 30 capsule, Refills: 0    Associated Diagnoses: GERD (gastroesophageal reflux disease)      citalopram (CELEXA) 40 MG tablet TAKE ONE TABLET BY MOUTH DAILY  Qty: 90 tablet, Refills: 0    Associated Diagnoses: Depressive disorder, not elsewhere classified      Multiple Vitamins-Minerals (CENTRUM SILVER) per tablet Take 1 tablet by mouth daily.      montelukast (SINGULAIR) 10 MG tablet Take 10 mg by mouth At Bedtime.      order for DME Equipment being ordered: rib belt  Qty: 1 Device, Refills: 0    Associated Diagnoses: Closed fracture of  multiple ribs of right side, initial encounter; Rib pain on right side         STOP taking these medications       HYDROcodone-acetaminophen (NORCO) 5-325 MG per tablet Comments:   Reason for Stopping:         HYDROcodone-acetaminophen (NORCO)  MG per tablet Comments:   Reason for Stopping:             Allergies   Allergies   Allergen Reactions     Fish Allergy      Throat swelled, can tolerate shrimp,crab     Novocain [Procaine Hcl] Anaphylaxis     Face swelled difficulty breathing     Shellfish-Derived Products Shortness Of Breath     Fosamax [Alendronate Sodium]      Penicillins      Unsure of reaction     Data   Most Recent 3 CBC's:  Recent Labs   Lab Test  05/23/17   0755  05/22/17   1045  04/21/17   1358   WBC  7.0  7.7  10.3   HGB  11.8  12.9  11.0*   MCV  94  93  101*   PLT  250  286  293      Most Recent 3 BMP's:  Recent Labs   Lab Test  05/24/17   0755  05/23/17   0755  05/22/17   1045   NA  139  135  136   POTASSIUM  3.6  3.8  3.8   CHLORIDE  104  103  101   CO2  25  19*  22   BUN  26  26  23   CR  0.80  0.82  0.97   ANIONGAP  10  13  13   NANCY  8.6  8.8  9.3   GLC  104*  158*  123*     Most Recent 2 LFT's:  Recent Labs   Lab Test  04/21/17   1358  12/28/16   1304   AST  9  30   ALT  14  35   ALKPHOS  67  102   BILITOTAL  0.3  0.5     Most Recent INR's and Anticoagulation Dosing History:  Anticoagulation Dose History     Recent Dosing and Labs Latest Ref Rng & Units 7/31/2012    INR 0.86 - 1.14 0.99        Most Recent 3 Troponin's:  Recent Labs   Lab Test  05/22/17 2012 05/22/17   1450  12/29/16   0250   07/31/12   1123   TROPI  <0.015  The 99th percentile for upper reference range is 0.045 ug/L.  Troponin values in   the range of 0.045 - 0.120 ug/L may be associated with risks of adverse   clinical events.    <0.015  The 99th percentile for upper reference range is 0.045 ug/L.  Troponin values in   the range of 0.045 - 0.120 ug/L may be associated with risks of adverse   clinical events.    0.026    < >   --    TROPONIN   --    --    --    --   0.13*    < > = values in this interval not displayed.     Most Recent Cholesterol Panel:  Recent Labs   Lab Test  12/29/16   0445   CHOL  135   LDL  53   HDL  61   TRIG  103     Most Recent 6 Bacteria Isolates From Any Culture (See EPIC Reports for Culture Details):  Recent Labs   Lab Test  05/22/17   1500  05/22/17   1127  05/22/17   1122  05/22/17   1053  12/28/16   1304  04/02/16   1543   CULT  Heavy growth Normal les  No growth after 3 days  >100,000 colonies/mL Escherichia coli  >100,000 colonies/mL Klebsiella pneumoniae  *  No growth after 3 days  >100,000 colonies/mL Escherichia coli  10,000 to 50,000 colonies/mL Enterobacter cloacae complex  *  >100,000 colonies/mL Escherichia coli  This isolate tested negative for ESBL  production  *     Most Recent TSH, T4 and A1c Labs:  Recent Labs   Lab Test  05/22/17   1450   TSH  1.25     Results for orders placed or performed during the hospital encounter of 05/22/17   XR Chest 2 Views    Narrative    CHEST TWO VIEWS  5/22/2017 11:06 AM     HISTORY: Productive cough.    COMPARISON: 12/28/2016.    FINDINGS: Advanced thoracolumbar scoliosis concave to the right at the  thoracolumbar junction. Heart size normal. Lungs clear. No interval  change.      Impression    IMPRESSION: Nothing acute. No interval change.    FRANCISCO CARMEN MD

## 2017-05-28 LAB
BACTERIA SPEC CULT: NO GROWTH
BACTERIA SPEC CULT: NO GROWTH
Lab: NORMAL
Lab: NORMAL
MICRO REPORT STATUS: NORMAL
MICRO REPORT STATUS: NORMAL
SPECIMEN SOURCE: NORMAL
SPECIMEN SOURCE: NORMAL

## 2017-09-14 ENCOUNTER — DOCUMENTATION ONLY (OUTPATIENT)
Dept: OTHER | Facility: CLINIC | Age: 82
End: 2017-09-14

## 2017-09-14 PROBLEM — Z71.89 ACP (ADVANCE CARE PLANNING): Chronic | Status: ACTIVE | Noted: 2017-09-14

## 2017-10-08 ENCOUNTER — HOSPITAL ENCOUNTER (EMERGENCY)
Facility: CLINIC | Age: 82
Discharge: HOME | End: 2017-10-08
Attending: EMERGENCY MEDICINE | Admitting: EMERGENCY MEDICINE
Payer: MEDICARE

## 2017-10-08 VITALS
WEIGHT: 116 LBS | HEIGHT: 59 IN | RESPIRATION RATE: 18 BRPM | TEMPERATURE: 98.9 F | BODY MASS INDEX: 23.39 KG/M2 | OXYGEN SATURATION: 97 % | DIASTOLIC BLOOD PRESSURE: 107 MMHG | SYSTOLIC BLOOD PRESSURE: 187 MMHG

## 2017-10-08 DIAGNOSIS — I10 ESSENTIAL HYPERTENSION: Primary | ICD-10-CM

## 2017-10-08 DIAGNOSIS — H10.9 BACTERIAL CONJUNCTIVITIS OF LEFT EYE: ICD-10-CM

## 2017-10-08 LAB
ANION GAP SERPL CALCULATED.3IONS-SCNC: 8 MMOL/L (ref 3–14)
BUN SERPL-MCNC: 15 MG/DL (ref 7–30)
CALCIUM SERPL-MCNC: 9.5 MG/DL (ref 8.5–10.1)
CHLORIDE SERPL-SCNC: 103 MMOL/L (ref 94–109)
CO2 SERPL-SCNC: 28 MMOL/L (ref 20–32)
CREAT SERPL-MCNC: 0.91 MG/DL (ref 0.52–1.04)
GFR SERPL CREATININE-BSD FRML MDRD: 58 ML/MIN/1.7M2
GLUCOSE SERPL-MCNC: 108 MG/DL (ref 70–99)
INTERPRETATION ECG - MUSE: NORMAL
POTASSIUM SERPL-SCNC: 4.1 MMOL/L (ref 3.4–5.3)
SODIUM SERPL-SCNC: 139 MMOL/L (ref 133–144)

## 2017-10-08 PROCEDURE — 25000132 ZZH RX MED GY IP 250 OP 250 PS 637: Mod: GY | Performed by: EMERGENCY MEDICINE

## 2017-10-08 PROCEDURE — 99284 EMERGENCY DEPT VISIT MOD MDM: CPT

## 2017-10-08 PROCEDURE — 93005 ELECTROCARDIOGRAM TRACING: CPT

## 2017-10-08 PROCEDURE — A9270 NON-COVERED ITEM OR SERVICE: HCPCS | Mod: GY | Performed by: EMERGENCY MEDICINE

## 2017-10-08 PROCEDURE — 80048 BASIC METABOLIC PNL TOTAL CA: CPT | Performed by: EMERGENCY MEDICINE

## 2017-10-08 RX ORDER — AMLODIPINE BESYLATE 5 MG/1
5 TABLET ORAL ONCE
Status: COMPLETED | OUTPATIENT
Start: 2017-10-08 | End: 2017-10-08

## 2017-10-08 RX ORDER — POLYMYXIN B SULFATE AND TRIMETHOPRIM 1; 10000 MG/ML; [USP'U]/ML
1 SOLUTION OPHTHALMIC
Qty: 3 ML | Refills: 0 | Status: SHIPPED | OUTPATIENT
Start: 2017-10-08 | End: 2017-10-15

## 2017-10-08 RX ORDER — METOPROLOL SUCCINATE 25 MG/1
25 TABLET, EXTENDED RELEASE ORAL ONCE
Status: COMPLETED | OUTPATIENT
Start: 2017-10-08 | End: 2017-10-08

## 2017-10-08 RX ADMIN — METOPROLOL SUCCINATE 25 MG: 25 TABLET, EXTENDED RELEASE ORAL at 15:45

## 2017-10-08 RX ADMIN — AMLODIPINE BESYLATE 5 MG: 5 TABLET ORAL at 15:48

## 2017-10-08 ASSESSMENT — ENCOUNTER SYMPTOMS
SHORTNESS OF BREATH: 0
DIARRHEA: 0
EYE PAIN: 0
VOMITING: 0
EYE ITCHING: 0
ANAL BLEEDING: 0
EYE DISCHARGE: 1
NAUSEA: 0

## 2017-10-08 NOTE — DISCHARGE INSTRUCTIONS
Discharge Instructions  Hypertension - High Blood Pressure    During you visit to the Emergency Department, your blood pressure was higher than the recommended blood pressure.  This may be related to stress, pain, medication or other temporary conditions. In these cases, your blood pressure may return to normal on its own. If you have a history of high blood pressure, you may need to have your provider adjust your medications. Sometimes, your high measurement here may indicate that you have developed high blood pressure that will stay high unless it is treated. As a general rule, high blood pressure causes problems over years rather than days, weeks, or months. So, while it is important to treat blood pressure, it is rarely important to treat blood pressure immediately. Occasionally we will begin a medication in the Emergency Department; more often we will recommend close follow-up for medications with a primary doctor/clinic.    Generally, every Emergency Department visit should have a follow-up clinic visit with either a primary or a specialty clinic/provider. Please follow-up as instructed by your emergency provider today.    Return to the Emergency Department if you start to have:    A severe headache.    Chest pain.    Shortness of breath.    Weakness or numbness that affects one part of the body.    Confusion.    Vision changes.    Significant swelling of legs and/or eyes.    A reaction to any medication started in the Emergency Department.    What can I do to help myself?    Avoid alcohol.    Take any blood pressure medicine that you are prescribed.    Get a good night s sleep.    Lower your salt intake.    Exercise.    Lose weight.    Manage stress.    See your doctor regularly    If blood pressure medication was started in the Emergency Department:    The medicine may not have an immediate effect. The body and brain determine what blood pressure you have. The medicine s job is to retrain the body s  " thermostat  to a lower blood pressure.    You will need to follow up with your provider to see how this medicine is working for you.  If you were given a prescription for medicine here today, be sure to read all of the information (including the package insert) that comes with your prescription.  This will include important information about the medicine, its side effects, and any warnings that you need to know about.  The pharmacist who fills the prescription can provide more information and answer questions you may have about the medicine.  If you have questions or concerns that the pharmacist cannot address, please call or return to the Emergency Department.   Remember that you can always come back to the Emergency Department if you are not able to see your regular provider in the amount of time listed above, if you get any new symptoms, or if there is anything that worries you.    Discharge Instructions  Conjunctivitis  Conjunctivitis, or \"pinkeye\", is inflammation of the conjunctiva, which is the thin membrane that lines the inner surface of the eyelids and the whites of the eyes.   There are four main types of conjunctivitis: viral, bacterial, allergic, and non-specific. Both bacterial and viral conjunctivitis spread easily from one person to another by contact with the eye or another person s hands, by an object the infected person has touched (such as a door handle), or by sharing an object that has touched their eye (such as a towel or pillowcase). Because of this, children with bacterial conjunctivitis cannot go back to school or  until they have been on antibiotics for 24 hours.  Generally, every Emergency Department visit should have a follow-up clinic visit with either a primary or a specialty clinic/provider. Please follow-up as instructed by your emergency provider today.  VIRAL CONJUNCTIVITIS: The virus that causes the common cold and is often seen as part of a general cold typically causes " this type of conjunctivitis.  This type of conjunctivitis is not treated with antibiotics, and usually lasts 3 - 5 days.  An over-the-counter antihistamine/decongestant eye drop may help to relieve the itching and irritation of viral conjunctivitis.  BACTERIAL CONJUNCTIVITIS:  This is treated with an antibiotic ointment or eye drop.  In both bacterial and viral conjunctivitis, do not wear contact lenses until your eye is no longer red.   Your contact case should be thrown away and the contacts disinfected overnight, or replaced if disposable.  NON-SPECIFIC CONJUNCTIVITIS: Sometimes a red eye is caused by other things such as dry eye, chemical exposure, or foreign body in the eye such as dust or eyelash.   All of these problems generally improve on their own within 24 hours.  ALLERGIC CONJUNCTIVITIS: These are eye symptoms caused by allergies. This type of conjunctivitis will be treated with allergy medications.    Return to the Emergency Department if:    If you have blurry vision.    If you have increasing eye pain or drainage.    If you have new redness or swelling in the skin around the eye.  If you were given a prescription for medicine here today, be sure to read all of the information (including the package insert) that comes with your prescription.  This will include important information about the medicine, its side effects, and any warnings that you need to know about.  The pharmacist who fills the prescription can provide more information and answer questions you may have about the medicine.  If you have questions or concerns that the pharmacist cannot address, please call or return to the Emergency Department.   Remember that you can always come back to the Emergency Department if you are not able to see your regular provider in the amount of time listed above, if you get any new symptoms, or if there is anything that worries you.

## 2017-10-08 NOTE — ED NOTES
Bed: ED25  Expected date: 10/8/17  Expected time: 1:58 PM  Means of arrival: Ambulance  Comments:  [phone] [id number] HTN

## 2017-10-08 NOTE — ED AVS SNAPSHOT
Emergency Department    [address] [facility] [address]    Phone:  [phone]    Fax:  [phone]                                       Laya Lutz   MRN: [id number]    Department:   Emergency Department   Date of Visit:  10/8/2017           After Visit Summary Signature Page     I have received my discharge instructions, and my questions have been answered. I have discussed any challenges I see with this plan with the nurse or doctor.    ..........................................................................................................................................  Patient/Patient Representative Signature      ..........................................................................................................................................  Patient Representative Print Name and Relationship to Patient    ..................................................               ................................................  Date                                            Time    ..........................................................................................................................................  Reviewed by Signature/Title    ...................................................              ..............................................  Date                                                            Time

## 2017-10-08 NOTE — ED AVS SNAPSHOT
Emergency Department    [address] [facility] [address]    Phone:  [phone]    Fax:  [phone]                                       Laya Lutz   MRN: [id number]    Department:   Emergency Department   Date of Visit:  10/8/2017           Patient Information     Date Of Birth          3/13/1926        Your diagnoses for this visit were:     Essential hypertension     Bacterial conjunctivitis of left eye        You were seen by Evens Vang MD.      Follow-up Information     Follow up with  Emergency Department.    Specialty:  EMERGENCY MEDICINE    Why:  If symptoms worsen    Contact information:    [address] [facility] [id number] [phone]        Follow up with Dayton Huston MD In 2 days.    Specialty:  Family Practice    Why:  For re-check from today's ED visit    Contact information:    [facility] [address] [address]  [phone]          Discharge Instructions       Discharge Instructions  Hypertension - High Blood Pressure    During you visit to the Emergency Department, your blood pressure was higher than the recommended blood pressure.  This may be related to stress, pain, medication or other temporary conditions. In these cases, your blood pressure may return to normal on its own. If you have a history of high blood pressure, you may need to have your provider adjust your medications. Sometimes, your high measurement here may indicate that you have developed high blood pressure that will stay high unless it is treated. As a general rule, high blood pressure causes problems over years rather than days, weeks, or months. So, while it is important to treat blood pressure, it is rarely important to treat blood pressure immediately. Occasionally we will begin a medication in the Emergency Department; more often we will recommend close follow-up for medications with a primary doctor/clinic.    Generally,  every Emergency Department visit should have a follow-up clinic visit with either a primary or a specialty clinic/provider. Please follow-up as instructed by your emergency provider today.    Return to the Emergency Department if you start to have:    A severe headache.    Chest pain.    Shortness of breath.    Weakness or numbness that affects one part of the body.    Confusion.    Vision changes.    Significant swelling of legs and/or eyes.    A reaction to any medication started in the Emergency Department.    What can I do to help myself?    Avoid alcohol.    Take any blood pressure medicine that you are prescribed.    Get a good night s sleep.    Lower your salt intake.    Exercise.    Lose weight.    Manage stress.    See your doctor regularly    If blood pressure medication was started in the Emergency Department:    The medicine may not have an immediate effect. The body and brain determine what blood pressure you have. The medicine s job is to retrain the body s  thermostat  to a lower blood pressure.    You will need to follow up with your provider to see how this medicine is working for you.  If you were given a prescription for medicine here today, be sure to read all of the information (including the package insert) that comes with your prescription.  This will include important information about the medicine, its side effects, and any warnings that you need to know about.  The pharmacist who fills the prescription can provide more information and answer questions you may have about the medicine.  If you have questions or concerns that the pharmacist cannot address, please call or return to the Emergency Department.   Remember that you can always come back to the Emergency Department if you are not able to see your regular provider in the amount of time listed above, if you get any new symptoms, or if there is anything that worries you.    Discharge Instructions  Conjunctivitis  Conjunctivitis, or  "\"pinkeye\", is inflammation of the conjunctiva, which is the thin membrane that lines the inner surface of the eyelids and the whites of the eyes.   There are four main types of conjunctivitis: viral, bacterial, allergic, and non-specific. Both bacterial and viral conjunctivitis spread easily from one person to another by contact with the eye or another person s hands, by an object the infected person has touched (such as a door handle), or by sharing an object that has touched their eye (such as a towel or pillowcase). Because of this, children with bacterial conjunctivitis cannot go back to school or  until they have been on antibiotics for 24 hours.  Generally, every Emergency Department visit should have a follow-up clinic visit with either a primary or a specialty clinic/provider. Please follow-up as instructed by your emergency provider today.  VIRAL CONJUNCTIVITIS: The virus that causes the common cold and is often seen as part of a general cold typically causes this type of conjunctivitis.  This type of conjunctivitis is not treated with antibiotics, and usually lasts 3 - 5 days.  An over-the-counter antihistamine/decongestant eye drop may help to relieve the itching and irritation of viral conjunctivitis.  BACTERIAL CONJUNCTIVITIS:  This is treated with an antibiotic ointment or eye drop.  In both bacterial and viral conjunctivitis, do not wear contact lenses until your eye is no longer red.   Your contact case should be thrown away and the contacts disinfected overnight, or replaced if disposable.  NON-SPECIFIC CONJUNCTIVITIS: Sometimes a red eye is caused by other things such as dry eye, chemical exposure, or foreign body in the eye such as dust or eyelash.   All of these problems generally improve on their own within 24 hours.  ALLERGIC CONJUNCTIVITIS: These are eye symptoms caused by allergies. This type of conjunctivitis will be treated with allergy medications.    Return to the Emergency " Department if:    If you have blurry vision.    If you have increasing eye pain or drainage.    If you have new redness or swelling in the skin around the eye.  If you were given a prescription for medicine here today, be sure to read all of the information (including the package insert) that comes with your prescription.  This will include important information about the medicine, its side effects, and any warnings that you need to know about.  The pharmacist who fills the prescription can provide more information and answer questions you may have about the medicine.  If you have questions or concerns that the pharmacist cannot address, please call or return to the Emergency Department.   Remember that you can always come back to the Emergency Department if you are not able to see your regular provider in the amount of time listed above, if you get any new symptoms, or if there is anything that worries you.        24 Hour Appointment Hotline       To make an appointment at any [facility], call [phone] ([phone]). If you don't have a family doctor or clinic, we will help you find one. [facility] clinics are conveniently located to serve the needs of you and your family.             Review of your medicines      START taking        Dose / Directions Last dose taken    trimethoprim-polymyxin b ophthalmic solution   Commonly known as:  POLYTRIM   Dose:  1 drop   Quantity:  3 mL        Place 1 drop Into the left eye every 3 hours for 7 days   Refills:  0          Our records show that you are taking the medicines listed below. If these are incorrect, please call your family doctor or clinic.        Dose / Directions Last dose taken    acetaminophen 325 MG tablet   Commonly known as:  TYLENOL   Dose:  650 mg   Quantity:  100 tablet        Take 2 tablets (650 mg) by mouth every 4 hours as needed for mild pain   Refills:  0        AMLODIPINE BESYLATE PO   Dose:  5 mg        Take 5 mg by mouth daily    Refills:  0        budesonide 180 MCG/ACT inhaler   Commonly known as:  PULMICORT FLEXHALER   Dose:  1 puff        Inhale 1 puff into the lungs 2 times daily   Refills:  0        CENTRUM SILVER per tablet   Dose:  1 tablet        Take 1 tablet by mouth daily.   Refills:  0        citalopram 40 MG tablet   Commonly known as:  celeXA   Quantity:  90 tablet        TAKE ONE TABLET BY MOUTH DAILY   Refills:  0        FOSAMAX PO   Dose:  70 mg        Take 70 mg by mouth once a week   Refills:  0        GABAPENTIN PO   Dose:  900 mg        Take 900 mg by mouth At Bedtime (takes 3 x 300 mg caps)   Refills:  0        LISINOPRIL PO   Dose:  40 mg        Take 40 mg by mouth daily   Refills:  0        metoprolol 25 MG 24 hr tablet   Commonly known as:  TOPROL-XL   Dose:  25 mg   Quantity:  90 tablet        Take 1 tablet by mouth daily.   Refills:  0        omeprazole 20 MG CR capsule   Commonly known as:  priLOSEC   Quantity:  30 capsule        TAKE ONE CAPSULE BY MOUTH DAILY 30 MINUTES BEFORE BREAKFAST   Refills:  0        order for DME   Quantity:  1 Device        Equipment being ordered: rib belt   Refills:  0        predniSONE 10 MG tablet   Commonly known as:  DELTASONE        Take 50 mg for 2 days starting 5/26/17, then 40 mg for 2 days, then 30 mg for 2 days, then 20 mg for 2 days, then 10 mg for 2 days, then stop.   Refills:  0        PROAIR  (90 BASE) MCG/ACT Inhaler   Quantity:  2 Inhaler   Generic drug:  albuterol        INHALE 2 PUFFS BY MOUTH EVERY 4 TO 6 HOURS AS NEEDED FOR WHEEZE   Refills:  4        SERTRALINE HCL PO   Dose:  50 mg        Take 50 mg by mouth daily   Refills:  0        SINGULAIR 10 MG tablet   Dose:  10 mg   Generic drug:  montelukast        Take 10 mg by mouth At Bedtime.   Refills:  0                Prescriptions were sent or printed at these locations (1 Prescription)                   Other Prescriptions                Printed at Department/Unit printer (1 of 1)          trimethoprim-polymyxin b (POLYTRIM) ophthalmic solution                Procedures and tests performed during your visit     Basic metabolic panel    EKG 12 lead      Orders Needing Specimen Collection     None      Pending Results     Date and Time Order Name Status Description    10/8/2017 1454 EKG 12 lead Preliminary             Pending Culture Results     No orders found from 10/6/2017 to 10/9/2017.            Pending Results Instructions     If you had any lab results that were not finalized at the time of your Discharge, you can call the ED Lab Result RN at [phone]. You will be contacted by this team for any positive Lab results or changes in treatment. The nurses are available 7 days a week from 10A to 6:30P.  You can leave a message 24 hours per day and they will return your call.        Test Results From Your Hospital Stay        10/8/2017  4:02 PM      Component Results     Component Value Ref Range & Units Status    Sodium 139 133 - 144 mmol/L Final    Potassium 4.1 3.4 - 5.3 mmol/L Final    Chloride 103 94 - 109 mmol/L Final    Carbon Dioxide 28 20 - 32 mmol/L Final    Anion Gap 8 3 - 14 mmol/L Final    Glucose 108 (H) 70 - 99 mg/dL Final    Urea Nitrogen 15 7 - 30 mg/dL Final    Creatinine 0.91 0.52 - 1.04 mg/dL Final    GFR Estimate 58 (L) >60 mL/min/1.7m2 Final    Non  GFR Calc    GFR Estimate If Black 70 >60 mL/min/1.7m2 Final    African American GFR Calc    Calcium 9.5 8.5 - 10.1 mg/dL Final                Clinical Quality Measure: Blood Pressure Screening     Your blood pressure was checked while you were in the emergency department today. The last reading we obtained was  BP: (!) 170/114 . Please read the guidelines below about what these numbers mean and what you should do about them.  If your systolic blood pressure (the top number) is less than 120 and your diastolic blood pressure (the bottom number) is less than 80, then your blood pressure is normal. There is nothing more  "that you need to do about it.  If your systolic blood pressure (the top number) is 120-139 or your diastolic blood pressure (the bottom number) is 80-89, your blood pressure may be higher than it should be. You should have your blood pressure rechecked within a year by a primary care provider.  If your systolic blood pressure (the top number) is 140 or greater or your diastolic blood pressure (the bottom number) is 90 or greater, you may have high blood pressure. High blood pressure is treatable, but if left untreated over time it can put you at risk for heart attack, stroke, or kidney failure. You should have your blood pressure rechecked by a primary care provider within the next 4 weeks.  If your provider in the emergency department today gave you specific instructions to follow-up with your doctor or provider even sooner than that, you should follow that instruction and not wait for up to 4 weeks for your follow-up visit.        Thank you for choosing [facility]       Thank you for choosing [facility] for your care. Our goal is always to provide you with excellent care. Hearing back from our patients is one way we can continue to improve our services. Please take a few minutes to complete the written survey that you may receive in the mail after you visit with us. Thank you!        [employer] Information     [employer] lets you send messages to your doctor, view your test results, renew your prescriptions, schedule appointments and more. To sign up, go to [email or web address] . Click on \"Log in\" on the left side of the screen, which will take you to the Welcome page. Then click on \"Sign up Now\" on the right side of the page.     You will be asked to enter the access code listed below, as well as some personal information. Please follow the directions to create your username and password.     Your access code is: [email or web address]  Expires: 2018  4:30 PM     Your access code will  in 90 days. If you need help or a " new code, please call your [facility] clinic or [phone].        Care EveryWhere ID     This is your Care EveryWhere ID. This could be used by other organizations to access your [facility] medical records  [id number]        Equal Access to Services     NATHANIEL CONWAY : Gabriel lim Sorachell, waveronicada luqadaha, qaybta kaalmada amita, paige newman. So [facility] [phone].    ATENCIÓN: Si habla español, tiene a jara disposición servicios gratuitos de asistencia lingüística. Llame al [phone].    We comply with applicable federal civil rights laws and [place] laws. We do not discriminate on the basis of race, color, national origin, age, disability, sex, sexual orientation, or gender identity.            After Visit Summary       This is your record. Keep this with you and show to your community pharmacist(s) and doctor(s) at your next visit.

## 2017-10-08 NOTE — ED PROVIDER NOTES
History     Chief Complaint:  Hypertension    HPI   Laya Lutz is a 89 year old female who presents with hypertension. Patient arrived via EMS. The patient reports that she was at home today, and was going to take her medications but gagged on them this morning. She also noted spitting up her blood pressure medication and has not been able to take it at all today. Patient also complains of discharge in the left eye, however no eye pain. Denies chest pain, shortness of breath, nausea, vomiting, diarrhea, abdominal pain, and leg swelling.     Allergies:  Novocain  Fosamax  Penicillins     Medications:    budesonide (PULMICORT FLEXHALER) 180 MCG/ACT inhaler  predniSONE (DELTASONE) 10 MG tablet  LISINOPRIL PO  AMLODIPINE BESYLATE PO  GABAPENTIN PO  Alendronate Sodium (FOSAMAX PO)  SERTRALINE HCL PO  metoprolol (TOPROL-XL) 25 MG 24 hr tablet  albuterol (PROAIR HFA) 108 (90 BASE) MCG/ACT inhaler  omeprazole (PRILOSEC) 20 MG capsule  citalopram (CELEXA) 40 MG tablet  Multiple Vitamins-Minerals (CENTRUM SILVER) per tablet  montelukast (SINGULAIR) 10 MG tablet    Past Medical History:    Asthma  Depression  Hypertension  Generalized weakness  Encephalopathy acute  Lumbar disc disease    Past Surgical History:    Appendectomy  Cataract IOL  Cholecystectomy  Discectomy lumbar posterior microscopic one level  Hysterectomy    Family History:    History reviewed. No pertinent family history.     Social History:  Smoking status: None  Alcohol use: Yes  Marital Status:   [5]     Review of Systems   Eyes: Positive for discharge. Negative for pain and itching.   Respiratory: Negative for shortness of breath.    Cardiovascular: Negative for chest pain and leg swelling.   Gastrointestinal: Negative for anal bleeding, diarrhea, nausea and vomiting.   All other systems reviewed and are negative.    Physical Exam   Patient Vitals for the past 24 hrs:   BP Temp Temp src Heart Rate Resp SpO2 Height Weight   10/08/17 1645 (!)  "187/107 - - - - - - -   10/08/17 1630 (!) 185/105 - - - - - - -   10/08/17 1615 (!) 175/102 - - - - 97 % - -   10/08/17 1600 (!) 170/114 - - - - 95 % - -   10/08/17 1548 (!) 184/103 - - - - - - -   10/08/17 1545 (!) 184/103 - - 91 - - - -   10/08/17 1530 (!) 187/97 - - - - 95 % - -   10/08/17 1515 (!) 175/105 - - - - 95 % - -   10/08/17 1500 (!) 167/96 - - - - 96 % - -   10/08/17 1445 (!) 166/97 - - - - 95 % - -   10/08/17 1430 165/90 - - - - 95 % - -   10/08/17 1418 (!) 176/106 98.9  F (37.2  C) Oral 101 18 95 % 1.499 m (4' 11\") 52.6 kg (116 lb)     Physical Exam  General: Alert, appears well-developed and well-nourished. Cooperative.     In no distress  HEENT:  Head:  Atraumatic  Ears:  External ears are normal  Mouth/Throat:  Oropharynx is without erythema or exudate and mucous membranes are moist.   Eyes:   Left eye with minor conjunctivitis with purulent exudate, non-painful.  Eye mattering to left eye only.  EOM are normal. No scleral icterus.    Pupils are equal, round, and reactive to light.   Neck:   Normal range of motion. Neck supple.  CV:  Normal rate, regular rhythm, normal heart sounds and radial and dorsalis pedis pulses are 2+ and symmetric.  No murmur.  Resp:  Breath sounds are clear bilaterally    Non-labored, no retractions or accessory muscle use  GI:  Abdomen is soft, no distension, no tenderness. No rebound or guarding.  MS:  Normal range of motion. No edema.    Back atraumatic.  Skin:  Warm and dry.  No rash or lesions noted.  Neuro: Alert. Normal strength.  Sensation intact in all 4 extremities. GCS: 15  Psych:  Normal mood and affect.    Emergency Department Course   ECG (15:17:01):  Rate 91 bpm. MO interval 192. QRS duration 70. QT/QTc 362/445. P-R-T axes 69 -43 41. Sinus rhythm with premature atrial complexes. Left axis deviation. Inferior infarct, age undetermined. Possible Anterior infarct, age undetermined. Abnormal ECG. Interpreted at 1522 by Evens Vang MD. No significant change " compared to EKG dated 5//22/17.    Laboratory:  BMP: Glucose 108 (H), GFR estimate 58 (L) WNL (Creatinine 0.91)    Interventions:  1545: Toprol-XL 25 mg Oral  1548: Norvasc 5 mg Oral    Emergency Department Course:  The patient arrived in the emergency department via EMS.  Past medical records, nursing notes, and vitals reviewed.  1459: I performed an exam of the patient and obtained history, as documented above.   IV inserted and blood drawn. The patient was placed on continuous cardiac monitoring and pulse oximetry.  ECG obtained, results above.   I rechecked the patient. Findings and plan explained to the Patient. Patient discharged home with instructions regarding supportive care, medications, and reasons to return. The importance of close follow-up was reviewed.     Impression & Plan    CMS Diagnoses: None    Medical Decision Making:  Laya Lutz is a 89 year old female with a complex past medical history pertinent for hypertension. Who presents with hypertension. Patient apparently gagged on her morning medications and did not receive her blood pressure medications. She had a single reading that was elevated above 170 systolic and was sent to the emergency department. I suspect her elevated blood pressure is due to not having compliance with her morning medications as she gagged them up. Patient has an ECG which is non-ischemic and unchanged from prior ECG. Basic metabolic panel shows normal creatinine, otherwise normal electrolytes. Patient complains of no chest pain or shortness of breath. She does have some left conjunctivitis that may be bacterial. She had mattering and some purulent discharge. I'll prescribe Polytrim for the left eye to be used at her nursing home. Patient should continue on her outpatient regimens. We gave a dose of her metoprolol as well as amlodipine here in the emergency department. Patient did have elevated blood pressure to the 170's, but should return to normal now that she has  had her medications. She should follow-up with her primary care in 2 days for recheck of her blood pressure, she should continue her normal medications as prescribed. Discharged home. Follow-up instructions understood, given to nursing home prior to discharge.     Critical Care time:  none    Diagnosis:    ICD-10-CM   1. Essential hypertension I10   2. Bacterial conjunctivitis of left eye H10.9     Disposition:  discharged to home    Discharge Medications:  New Prescriptions    TRIMETHOPRIM-POLYMYXIN B (POLYTRIM) OPHTHALMIC SOLUTION    Place 1 drop Into the left eye every 3 hours for 7 days     Herminia Pratt  10/8/2017    EMERGENCY DEPARTMENT  Herminia JACKMAN Do, am serving as a scribe at 2:59 PM on 10/8/2017 to document services personally performed by Evens Vang MD based on my observations and the provider's statements to me.      Evens Vang MD  10/08/17 2802

## 2017-10-08 NOTE — ED NOTES
"Pt C/O feeling \"hot\" and feels SOB. States \"I have asthma\". Took 2 puffs of her own inhaler (albuterol). Dr Vang informed.  "

## 2017-11-21 ENCOUNTER — APPOINTMENT (OUTPATIENT)
Dept: GENERAL RADIOLOGY | Facility: CLINIC | Age: 82
End: 2017-11-21
Attending: PHYSICIAN ASSISTANT
Payer: MEDICARE

## 2017-11-21 ENCOUNTER — HOSPITAL ENCOUNTER (EMERGENCY)
Facility: CLINIC | Age: 82
Discharge: HOME | End: 2017-11-21
Attending: EMERGENCY MEDICINE | Admitting: EMERGENCY MEDICINE
Payer: MEDICARE

## 2017-11-21 ENCOUNTER — APPOINTMENT (OUTPATIENT)
Dept: CT IMAGING | Facility: CLINIC | Age: 82
End: 2017-11-21
Attending: EMERGENCY MEDICINE
Payer: MEDICARE

## 2017-11-21 VITALS
HEART RATE: 84 BPM | RESPIRATION RATE: 18 BRPM | TEMPERATURE: 98.7 F | HEIGHT: 58 IN | SYSTOLIC BLOOD PRESSURE: 174 MMHG | DIASTOLIC BLOOD PRESSURE: 100 MMHG | BODY MASS INDEX: 22.25 KG/M2 | WEIGHT: 106 LBS | OXYGEN SATURATION: 93 %

## 2017-11-21 DIAGNOSIS — S00.83XA CONTUSION OF FACE, SCALP AND NECK, INITIAL ENCOUNTER: ICD-10-CM
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
## 2017-11-21 DIAGNOSIS — S00.03XA CONTUSION OF FACE, SCALP AND NECK, INITIAL ENCOUNTER: ICD-10-CM

## 2017-11-21 DIAGNOSIS — S10.93XA CONTUSION OF FACE, SCALP AND NECK, INITIAL ENCOUNTER: ICD-10-CM

## 2017-11-21 DIAGNOSIS — S80.12XA CONTUSION OF LEFT LOWER EXTREMITY, INITIAL ENCOUNTER: ICD-10-CM

## 2017-11-21 LAB
ANION GAP SERPL CALCULATED.3IONS-SCNC: 7 MMOL/L (ref 3–14)
BASOPHILS # BLD AUTO: 0 10E9/L (ref 0–0.2)
BASOPHILS NFR BLD AUTO: 0.4 %
BUN SERPL-MCNC: 15 MG/DL (ref 7–30)
CALCIUM SERPL-MCNC: 9.2 MG/DL (ref 8.5–10.1)
CHLORIDE SERPL-SCNC: 102 MMOL/L (ref 94–109)
CO2 SERPL-SCNC: 27 MMOL/L (ref 20–32)
CREAT SERPL-MCNC: 1.05 MG/DL (ref 0.52–1.04)
DIFFERENTIAL METHOD BLD: ABNORMAL
EOSINOPHIL # BLD AUTO: 0.2 10E9/L (ref 0–0.7)
EOSINOPHIL NFR BLD AUTO: 2.5 %
ERYTHROCYTE [DISTWIDTH] IN BLOOD BY AUTOMATED COUNT: 13.3 % (ref 10–15)
GFR SERPL CREATININE-BSD FRML MDRD: 49 ML/MIN/1.7M2
GLUCOSE SERPL-MCNC: 93 MG/DL (ref 70–99)
HCT VFR BLD AUTO: 34.2 % (ref 35–47)
HGB BLD-MCNC: 11.3 G/DL (ref 11.7–15.7)
IMM GRANULOCYTES # BLD: 0 10E9/L (ref 0–0.4)
IMM GRANULOCYTES NFR BLD: 0.4 %
INTERPRETATION ECG - MUSE: NORMAL
LYMPHOCYTES # BLD AUTO: 1.9 10E9/L (ref 0.8–5.3)
LYMPHOCYTES NFR BLD AUTO: 24.2 %
MCH RBC QN AUTO: 30.2 PG (ref 26.5–33)
MCHC RBC AUTO-ENTMCNC: 33 G/DL (ref 31.5–36.5)
MCV RBC AUTO: 91 FL (ref 78–100)
MONOCYTES # BLD AUTO: 0.9 10E9/L (ref 0–1.3)
MONOCYTES NFR BLD AUTO: 11.5 %
NEUTROPHILS # BLD AUTO: 4.7 10E9/L (ref 1.6–8.3)
NEUTROPHILS NFR BLD AUTO: 61 %
NRBC # BLD AUTO: 0 10*3/UL
NRBC BLD AUTO-RTO: 0 /100
PLATELET # BLD AUTO: 307 10E9/L (ref 150–450)
POTASSIUM SERPL-SCNC: 4.2 MMOL/L (ref 3.4–5.3)
RBC # BLD AUTO: 3.74 10E12/L (ref 3.8–5.2)
SODIUM SERPL-SCNC: 136 MMOL/L (ref 133–144)
TROPONIN I SERPL-MCNC: <0.015 UG/L (ref 0–0.04)
WBC # BLD AUTO: 7.7 10E9/L (ref 4–11)

## 2017-11-21 PROCEDURE — 84484 ASSAY OF TROPONIN QUANT: CPT | Performed by: PHYSICIAN ASSISTANT

## 2017-11-21 PROCEDURE — 99285 EMERGENCY DEPT VISIT HI MDM: CPT | Mod: 25

## 2017-11-21 PROCEDURE — 25000132 ZZH RX MED GY IP 250 OP 250 PS 637: Mod: GY

## 2017-11-21 PROCEDURE — 80048 BASIC METABOLIC PNL TOTAL CA: CPT | Performed by: PHYSICIAN ASSISTANT

## 2017-11-21 PROCEDURE — 85025 COMPLETE CBC W/AUTO DIFF WBC: CPT | Performed by: PHYSICIAN ASSISTANT

## 2017-11-21 PROCEDURE — 93005 ELECTROCARDIOGRAM TRACING: CPT

## 2017-11-21 PROCEDURE — 25000125 ZZHC RX 250

## 2017-11-21 PROCEDURE — 70450 CT HEAD/BRAIN W/O DYE: CPT

## 2017-11-21 PROCEDURE — 73552 X-RAY EXAM OF FEMUR 2/>: CPT | Mod: LT

## 2017-11-21 PROCEDURE — 72170 X-RAY EXAM OF PELVIS: CPT

## 2017-11-21 PROCEDURE — A9270 NON-COVERED ITEM OR SERVICE: HCPCS | Mod: GY

## 2017-11-21 RX ORDER — ALBUTEROL SULFATE 0.83 MG/ML
SOLUTION RESPIRATORY (INHALATION)
Status: COMPLETED
Start: 2017-11-21 | End: 2017-11-21

## 2017-11-21 RX ORDER — ACETAMINOPHEN 325 MG/1
TABLET ORAL
Status: COMPLETED
Start: 2017-11-21 | End: 2017-11-21

## 2017-11-21 RX ORDER — ALBUTEROL SULFATE 0.83 MG/ML
SOLUTION RESPIRATORY (INHALATION)
Status: DISCONTINUED
Start: 2017-11-21 | End: 2017-11-21 | Stop reason: WASHOUT

## 2017-11-21 RX ORDER — ALBUTEROL SULFATE 5 MG/ML
2.5 SOLUTION RESPIRATORY (INHALATION) EVERY 6 HOURS PRN
Status: DISCONTINUED | OUTPATIENT
Start: 2017-11-21 | End: 2017-11-21 | Stop reason: HOSPADM

## 2017-11-21 RX ADMIN — ACETAMINOPHEN 650 MG: 325 TABLET, FILM COATED ORAL at 18:50

## 2017-11-21 RX ADMIN — ALBUTEROL SULFATE 2.5 MG: 2.5 SOLUTION RESPIRATORY (INHALATION) at 17:14

## 2017-11-21 ASSESSMENT — ENCOUNTER SYMPTOMS
NECK PAIN: 1
FEVER: 0
MYALGIAS: 1
BACK PAIN: 1
DYSURIA: 0
ARTHRALGIAS: 1
ABDOMINAL PAIN: 0
DIZZINESS: 0
HEADACHES: 1
CHILLS: 0
LIGHT-HEADEDNESS: 1

## 2017-11-21 NOTE — ED AVS SNAPSHOT
Emergency Department    [address] [facility] [address]    Phone:  [phone]    Fax:  [phone]                                       Laya Lutz   MRN: [id number]    Department:   Emergency Department   Date of Visit:  11/21/2017           After Visit Summary Signature Page     I have received my discharge instructions, and my questions have been answered. I have discussed any challenges I see with this plan with the nurse or doctor.    ..........................................................................................................................................  Patient/Patient Representative Signature      ..........................................................................................................................................  Patient Representative Print Name and Relationship to Patient    ..................................................               ................................................  Date                                            Time    ..........................................................................................................................................  Reviewed by Signature/Title    ...................................................              ..............................................  Date                                                            Time

## 2017-11-21 NOTE — ED NOTES
Bed: ED10  Expected date: 11/21/17  Expected time: 1:57 PM  Means of arrival: Ambulance  Comments:  534 89F fall last night, head pain today

## 2017-11-21 NOTE — ED PROVIDER NOTES
History     Chief Complaint:  Fall     HPI   Laya Lutz is a 89 year old female, with a history of encephalopathy and physical deconditioning, who presents to the ED for evaluation of fall. The patient reports she fell today at 2:00AM. The patient notes she fell onto the bathroom floor and hit the left side of her head. The patient reports she felt lightheaded before falling, denies chest pain. No syncope. The patient notes she was able to ambulate back to bed with her walker and ambulated to breakfast this morning at her nursing home. The patient reports she currently has a headache, left sided neck pain, left shoulder pain, left leg pain, and tailbone pain. The patient denies any loss of consciousness, fevers, chills, dizziness, chest pain, abdominal pain, dysuria, gait problems, or other pain.       Allergies:  Novocain  Fosamax  Penicillins      Medications:    budesonide (PULMICORT FLEXHALER) 180 MCG/ACT inhaler   acetaminophen (TYLENOL) 325 MG tablet   LISINOPRIL PO   AMLODIPINE BESYLATE PO   GABAPENTIN PO   Alendronate Sodium (FOSAMAX PO)   SERTRALINE HCL PO   metoprolol (TOPROL-XL) 25 MG 24 hr tablet   albuterol (PROAIR HFA) 108 (90 BASE) MCG/ACT inhaler   omeprazole (PRILOSEC) 20 MG capsule   citalopram (CELEXA) 40 MG tablet   Multiple Vitamins-Minerals (CENTRUM SILVER) per tablet   montelukast (SINGULAIR) 10 MG tablet     Past Medical History:    Asthma  Depression  HTN  Encephalopathy   Lumbar disc disease w/ radiculopathy  Spinal stenosis   UTI  Physical deconditioning  Stress-induced cardiomyopathy  Tachycardia    Past Surgical History:    Appendectomy  Bilateral cataract IOL  Cholecystectomy  Left posterior lumbar discectomy, +1 level L3-4  Hysterectomy    Family History:    History reviewed. No pertinent family history.     Social History:  Smoking status: Never smoker  Alcohol use: Occasionally   Presents to ED alone   Resides at [facility]  Marital Status:   [5]    Review of  "Systems   Constitutional: Negative for chills and fever.   Cardiovascular: Negative for chest pain.   Gastrointestinal: Negative for abdominal pain.   Genitourinary: Negative for dysuria.   Musculoskeletal: Positive for arthralgias, back pain, myalgias and neck pain. Negative for gait problem.   Neurological: Positive for light-headedness and headaches. Negative for dizziness and syncope.   All other systems reviewed and are negative.    Physical Exam     Patient Vitals for the past 24 hrs:   BP Temp Temp src Pulse Resp SpO2 Height Weight   11/21/17 1618 - - - 84 - 95 % - -   11/21/17 1614 (!) 174/94 - - - - - - -   11/21/17 1506 191/89 - - - - 94 % - -   11/21/17 1500 - - - - - 94 % - -   11/21/17 1445 - - - - - 95 % - -   11/21/17 1430 - - - - - 94 % - -   11/21/17 1415 - - - - - 95 % - -   11/21/17 1413 (!) 183/94 98.7  F (37.1  C) Oral 89 18 94 % 1.473 m (4' 10\") 48.1 kg (106 lb)   11/21/17 1412 (!) 183/94 - - - - 93 % - -     Physical Exam  General: Alert, interactive in no distress. Resting comfortably on the bed.  Head:  Patient has a large area of ecchymosis to left forehead.  No other areas of bleeding or bruising on the head or scalp. No tenderness with palpation of cranial and facial bones.   Eyes:  EOM intact. The pupils are equal, round, and reactive to light. No scleral icterus.  ENT:                                      Ears:  The external ears are normal. No hemotympanum.   Nose:  The external nose is normal.  Throat:  The oropharynx is normal. Mucus membranes are moist.                 Neck:  Normal range of motion. There is no rigidity. Trachea midline.  CV:  Regular rate and rhythm. No murmur.   Resp:  Breath sounds are clear bilaterally. Non-labored, no retractions or accessory muscle use.  GI:  Abdomen is soft, no distension, no tenderness.   MS:  Normal strength in all 4 extremities. Patient is able to straight leg raise bilaterally with no pain. Full ROM of the hips and knees. Full ROM of " shoulders, elbows, and wrists.  No midline cervical, thoracic, or lumbar tenderness. The patient has diffuse tenderness to her left hip and left femur and left knee. No obvious deformity.   Skin:  Warm and dry, No rash or lesions noted.  Neuro:  Strength 5/5 x4. Sensation intact in all 4 extremities. CN 3-12 intact. GCS: 15  Psych:  Awake. Alert and orientedx3. Appropriate interactions.   Lymph: No anterior or posterior cervical lymphadenopathy noted.       Emergency Department Course     ECG (15:46:38):  Rate 82 bpm. HI interval 186. QRS duration 74. QT/QTc 388/453. P-R-T axes 62 -37 25. Sinus rhythm with premature supraventricular complexes. Left axis deviation. Inferior infarct, age undetermined. Abnormal ECG. No significant change compared to EKG dated 10/8/17. Interpreted at 1623 by Lorene Bahena MD.    Imaging:  Radiographic findings were communicated with the patient who voiced understanding of the findings.  Head CT w/o contrast   Final Result   IMPRESSION:    1. No acute abnormality.   2. Atrophy of the brain.  White matter changes consistent with   sequelae of small vessel ischemic disease. This is unchanged.        ROMIE DAMIAN MD      Femur XR,  2 views, left   Final Result   IMPRESSION: No evidence of acute fracture or malalignment.      DEBORA DE LEON MD      XR Pelvis 1/2 Views   Final Result   IMPRESSION: No evidence of acute fracture or malalignment.      DEBORA DE LEON MD         Laboratory:  Labs Ordered and Resulted from Time of ED Arrival Up to the Time of Departure from the ED   CBC WITH PLATELETS DIFFERENTIAL - Abnormal; Notable for the following:        Result Value    RBC Count 3.74 (*)     Hemoglobin 11.3 (*)     Hematocrit 34.2 (*)     All other components within normal limits   BASIC METABOLIC PANEL - Abnormal; Notable for the following:     Creatinine 1.05 (*)     GFR Estimate 49 (*)     GFR Estimate If Black 59 (*)     All other components within normal limits   TROPONIN I         Interventions:  Medications   albuterol (PROVENTIL) neb solution 2.5 mg (not administered)   albuterol (2.5 MG/3ML) 0.083% neb solution (not administered)       Emergency Department Course:  Past medical records, nursing notes, and vitals reviewed.  I performed an exam of the patient and obtained history, as documented above. GCS 15.     Blood drawn and IV inserted  Supervising provider also interviewed and examined the patient at bedside and agrees with the plan.    The patient was sent for a imaging as noted above while in the emergency department, findings above.     4:29 PM: I rechecked the patient and updated her on the results. Plan on road test and discharge to home.      The patient was ambulated here in the emergency department with a walker.  She reported feeling short of breath she has a history of asthma and took out her inhaler, though no mechanical difficulties. Ordered an albuterol nebulizer.     I rechecked the patient.  Findings and plan explained to the patient. Patient discharged home with instructions regarding supportive care, medications, and reasons to return. The importance of close follow-up was reviewed.        Impression & Plan      Medical Decision Making:  Laya Lutz is a 89 year old female presents after a fall in the middle of the night.  She reported feeling lightheaded and became off balance while walking to the bathroom.  She hit the left side of her head and fell onto her left side.  She was able to ambulate with her walker around the nursing home this morning, and after staff noted bruising to the left side of her forehead she was sent to the emergency department for further evaluation.  Here she had diffuse tenderness to the left hip and left femur and left knee, with full range of motion of all joints.  Head CT showed no acute abnormalities. Femur, which included imaging of the knee, and pelvis x-ray were negative for fracture or deformity.  EKG showed no significant  change compared to EKG dated 10/8/17. Troponin negative. The patient was able to ambulate here in the emergency department with her walker without mechanical difficulty.  She has a history of asthma and felt short of breath with ambulation so an albuterol nebulizer was given.  She felt improved after intervention and plan is to discharge home to her nursing home.  The patient agrees with plan and all questions/concerns were addressed.    Diagnosis:    ICD-10-CM    1. Fall, initial encounter W19.XXXA    2. Contusion of face, scalp and neck, initial encounter S00.83XA     S00.03XA     S10.93XA    3. Contusion of left lower extremity, initial encounter S80.12XA        Disposition: Discharged to home    Emergency Department Attending Supervision Note  11/21/2017  6:44 PM      I evaluated this patient in conjunction with Lorene Zacarias PA-C.    History of Present Illness:    Briefly, the patient presented with concern of bruising after a fall last night.  The fall was when she lost her balance which has been a problem for her.  No preceding chest pain or shortness of breath.  No syncope.  She has ambulated.  No vomiting.     Please consult ANNE-MARIE Zcaarias's full note above for additional information, history an complete ROS.    Physical Exam:  General: Well-nourished, no acute distress  Eyes: PERRL, conjunctivae pink no scleral icterus or conjunctival injection  ENT:  Moist mucus membranes  Respiratory:  No respiratory distress  CV: Normal rate   Skin: Warm, dry.  Bruising over left forehead, mildly tender.  Musculoskeletal: Mild left knee/femur/hip tenderness  Neuro: Alert and oriented to person/place/time, eating while I discuss with her  Psychiatric: Normal affect      Medical Decision Making:    I agree with MELODY Zacarias's medical decision making.      Laya Lutz is a 89 year old female who suffered a minor head trauma and contusions after a mechanical fall.  Given her age, the patient underwent head CT. CT reveals no skull  fracture or intracerebral hemorrhage. The patient is at their normal baseline neurologic state. Xrays were obtained at the locations of her mild tenderness and were negative.  She is at her baseline and ambulated and ate. Thus the patient will be discharged home although the patient was made aware of the possibility of delayed intracerebral hemorrhage and the need to return to the ER immediately for worsening headache, vision change, confusion, numbness, weakness, vomiting or any other concerns.    Diagnosis:    ICD-10-CM    1. Abdominal pain, generalized R10.84 Urine Culture         Lorene Zacarias PA-C  11/21/2017    EMERGENCY DEPARTMENT         Lorene Zacarias PA-C  11/21/17 7227       Lorene Bahena MD  11/21/17 5639

## 2017-11-21 NOTE — ED AVS SNAPSHOT
Emergency Department    [address] [facility] [address]    Phone:  [phone]    Fax:  [phone]                                       Laya Lutz   MRN: [id number]    Department:   Emergency Department   Date of Visit:  11/21/2017           Patient Information     Date Of Birth          3/13/1926        Your diagnoses for this visit were:     Fall, initial encounter     Contusion of face, scalp and neck, initial encounter     Contusion of left lower extremity, initial encounter        You were seen by Lorene Bahena MD.      Follow-up Information     Schedule an appointment as soon as possible for a visit with Dayton Huston MD.    Specialty:  Family Practice    Contact information:    [address] [id number] [phone]          Discharge Instructions       *No sports or activities that put you at risk for a repeat head injury until you have been without symptoms for 1 week. Use your walker every time.  *Tylenol as directed as needed for pain.  *Follow-up with your doctor for a recheck in 2-3 days.  *Return if you develop worsening headache, recurrent vomiting, seizures, neurologic changes or become worse in any way.      Discharge Instructions  Head Injury    You have been seen today for a head injury. You were checked for serious problems, like bleeding on the brain, but these problems cannot always be found right away.  Due to this risk, you should not be alone for 24 hours after your injury.  Follow up with your regular physician in 2-3 days. If you are taking a blood thinner, such as aspirin, Pradaxa  (dabigatran), Coumadin  (warfarin), or Plavix  (clopidogrel), you are at especially high risk for immediate or delayed bleeding, and need to re-check with a physician in 24 hours, or sooner if any of the symptoms below happen.     Return to the Emergency Department if:    You are confused, have amnesia, or you are not acting  right.    Your headache gets worse or you start to have a really bad headache even with your recommended treatment plan.    You vomit more than once.    You have a convulsion or seizure.    You have trouble walking.    You have weakness or paralysis in an arm or a leg.    You have blood or fluid coming from your ears or nose.    You have new symptoms or anything that worries you.    Sleeping:  It is okay for you to sleep, but someone should wake you up as instructed by your doctor, and someone should check on you at your usual time to wake up.     Activity:    Do not drive for at least 24 hours.    Do not drive if you have dizzy spells or trouble concentrating, or remembering things.    Do not return to any contact sports until cleared by your regular doctor.     Follow-up:  It is very important that you make an appointment with your clinic and go to the appointment.  If you do not follow-up with your regular doctor, it may result in missing an important development which could result in permanent injury or disability and/or lasting pain.  If there is any problem keeping your appointment, call your doctor or return to the Emergency Department.    MORE INFORMATION:    Concussion:  A concussion is a minor head injury that may cause temporary problems with the way your brain works.  Some symptoms include:  confusion, amnesia, nausea and vomiting, dizziness, fatigue, memory or concentration problems, irritability and sleep problems.    CT Scans: Your evaluation today may have included a CT scan (CAT scan) to look for things like bleeding or a skull fracture (break).  CT scans involve radiation and too many CT scans can cause serious health problems like cancer, especially in children.  Because of this, your doctor may not have ordered a CT scan today if they think you are at low risk for a serious or life threatening problem.    If you were given a prescription for medicine here today, be sure to read all of the  information (including the package insert) that comes with your prescription.  This will include important information about the medicine, its side effects, and any warnings that you need to know about.  The pharmacist who fills the prescription can provide more information and answer questions you may have about the medicine.  If you have questions or concerns that the pharmacist cannot address, please call or return to the Emergency Department.     Opioid Medication Information    Pain medications are among the most commonly prescribed medicines, so we are including this information for all our patients. If you did not receive pain medication or get a prescription for pain medicine, you can ignore it.     You may have been given a prescription for an opioid (narcotic) pain medicine and/or have received a pain medicine while here in the Emergency Department. These medicines can make you drowsy or impaired. You must not drive, operate dangerous equipment, or engage in any other dangerous activities while taking these medications. If you drive while taking these medications, you could be arrested for DUI, or driving under the influence. Do not drink any alcohol while you are taking these medications.     Opioid pain medications can cause addiction. If you have a history of chemical dependency of any type, you are at a higher risk of becoming addicted to pain medications.  Only take these prescribed medications to treat your pain when all other options have been tried. Take it for as short a time and as few doses as possible. Store your pain pills in a secure place, as they are frequently stolen and provide a dangerous opportunity for children or visitors in your house to start abusing these powerful medications. We will not replace any lost or stolen medicine.  As soon as your pain is better, you should flush all your remaining medication.     Many prescription pain medications contain Tylenol  (acetaminophen),  including Vicodin , Tylenol #3 , Norco , Lortab , and Percocet .  You should not take any extra pills of Tylenol  if you are using these prescription medications or you can get very sick.  Do not ever take more than 3000 mg of acetaminophen in any 24 hour period.    All opioids tend to cause constipation. Drink plenty of water and eat foods that have a lot of fiber, such as fruits, vegetables, prune juice, apple juice and high fiber cereal.  Take a laxative if you don t move your bowels at least every other day. Miralax , Milk of Magnesia, Colace , or Senna  can be used to keep you regular.      Remember that you can always come back to the Emergency Department if you are not able to see your regular doctor in the amount of time listed above, if you get any new symptoms, or if there is anything that worries you.            Understanding Bone Bruise (Bone Contusion)  A bone bruise is an injury to a bone that is less severe than a bone fracture. Bone bruises are fairly common. They can happen to people of all ages. Any type of bone in your body can be bruised. Other injuries often happen along with a bone bruise, such as damage to nearby ligaments.  What happens when a bone is bruised?  Bone is made of different kinds of tissue. The periosteum is a thin layer of tissue that covers most of a bone. Where bones come together, there is usually a layer of cartilage at the edges. The bone here is called subchondral bone. Deep inside the bone is an area called the medulla. It contains the bone marrow and fibrous tissue called trabeculae.  With a bone fracture, all of the trabeculae in a region of bone have broken. But with a bone bruise, an injury only damages some of these trabeculae. An injury might cause blood to build up in the area beneath the periosteum. This causes a subperiosteal hematoma, a type of bone bruise. An injury might also cause bleeding and swelling in the area between your cartilage and the bone beneath  it. This causes a subchondral bone bruise. Or bleeding and swelling can occur in the medulla of your bone. This is called an intraosseous bone bruise.  What causes a bone bruise?  Injury of any kind can cause a bone bruise. Sports injuries, motor vehicle accidents, or falls from a height can cause them. Twisting injuries that cause joint sprains can also cause a bone bruise. Health conditions like arthritis may also lead to a bone bruise. This is because arthritis causes bone surfaces to grind against each other. Child abuse is another cause of bone bruises.  Symptoms of a bone bruise  Symptoms of a bone bruise can include:    Pain and soreness in the injured area    Swelling in the area and soft tissues around it    Change in color of the injured area    Swelling or stiffness of an injured joint  This pain is often more severe and lasts longer than a soft tissue injury. How severe your symptoms are and how long they last depends on how severe the bone bruise is.  Diagnosing a bone bruise  Your healthcare provider will ask you about your medical history and symptoms. He or she will ask how you got your injury. Your provider will examine the injured area to check for pain, bruising, and swelling. After the exam, your health care provider may be able to tell if you have a bone bruise.  A bone bruise doesn t show up on an X-ray. But you may be given an X-ray to rule out a bone fracture. A fracture may need a different kind of treatment. An MRI can confirm a bone bruise. But your healthcare provider will likely only give you an MRI if your symptoms don t get better.  Date Last Reviewed: 4/1/2017 2000-2017 [employer]. [address] [place] [address], [place] [address]. All rights reserved. This information is not intended as a substitute for professional medical care. Always follow your healthcare professional's instructions.          24 Hour Appointment Hotline       To make an appointment at any [facility]  clinic, call [phone] ([phone]). If you don't have a family doctor or clinic, we will help you find one. [facility] clinics are conveniently located to serve the needs of you and your family.             Review of your medicines      Our records show that you are taking the medicines listed below. If these are incorrect, please call your family doctor or clinic.        Dose / Directions Last dose taken    acetaminophen 325 MG tablet   Commonly known as:  TYLENOL   Dose:  650 mg   Quantity:  100 tablet        Take 2 tablets (650 mg) by mouth every 4 hours as needed for mild pain   Refills:  0        AMLODIPINE BESYLATE PO   Dose:  5 mg        Take 5 mg by mouth daily   Refills:  0        budesonide 180 MCG/ACT inhaler   Commonly known as:  PULMICORT FLEXHALER   Dose:  1 puff        Inhale 1 puff into the lungs 2 times daily   Refills:  0        CENTRUM SILVER per tablet   Dose:  1 tablet        Take 1 tablet by mouth daily.   Refills:  0        citalopram 40 MG tablet   Commonly known as:  celeXA   Quantity:  90 tablet        TAKE ONE TABLET BY MOUTH DAILY   Refills:  0        FOSAMAX PO   Dose:  70 mg        Take 70 mg by mouth once a week   Refills:  0        GABAPENTIN PO   Dose:  900 mg        Take 900 mg by mouth At Bedtime (takes 3 x 300 mg caps)   Refills:  0        LISINOPRIL PO   Dose:  40 mg        Take 40 mg by mouth daily   Refills:  0        metoprolol 25 MG 24 hr tablet   Commonly known as:  TOPROL-XL   Dose:  25 mg   Quantity:  90 tablet        Take 1 tablet by mouth daily.   Refills:  0        omeprazole 20 MG CR capsule   Commonly known as:  priLOSEC   Quantity:  30 capsule        TAKE ONE CAPSULE BY MOUTH DAILY 30 MINUTES BEFORE BREAKFAST   Refills:  0        order for DME   Quantity:  1 Device        Equipment being ordered: rib belt   Refills:  0        predniSONE 10 MG tablet   Commonly known as:  DELTASONE        Take 50 mg for 2 days starting 5/26/17, then 40 mg for 2 days, then 30  mg for 2 days, then 20 mg for 2 days, then 10 mg for 2 days, then stop.   Refills:  0        PROAIR  (90 BASE) MCG/ACT Inhaler   Quantity:  2 Inhaler   Generic drug:  albuterol        INHALE 2 PUFFS BY MOUTH EVERY 4 TO 6 HOURS AS NEEDED FOR WHEEZE   Refills:  4        SERTRALINE HCL PO   Dose:  50 mg        Take 50 mg by mouth daily   Refills:  0        SINGULAIR 10 MG tablet   Dose:  10 mg   Generic drug:  montelukast        Take 10 mg by mouth At Bedtime.   Refills:  0                Procedures and tests performed during your visit     Basic metabolic panel    CBC with platelets + differential    EKG 12 lead    Femur XR,  2 views, left    Head CT w/o contrast    Troponin I (now)    XR Pelvis 1/2 Views      Orders Needing Specimen Collection     None      Pending Results     No orders found from 11/19/2017 to 11/22/2017.            Pending Culture Results     No orders found from 11/19/2017 to 11/22/2017.            Pending Results Instructions     If you had any lab results that were not finalized at the time of your Discharge, you can call the ED Lab Result RN at [phone]. You will be contacted by this team for any positive Lab results or changes in treatment. The nurses are available 7 days a week from 10A to 6:30P.  You can leave a message 24 hours per day and they will return your call.        Test Results From Your Hospital Stay              11/21/2017  3:35 PM      Narrative     XR FEMUR LT 2 VW 11/21/2017 3:26 PM    HISTORY: Pain.    COMPARISON: None.        Impression     IMPRESSION: No evidence of acute fracture or malalignment.    DEBORA DE LEON MD               11/21/2017  3:22 PM      Component Results     Component Value Ref Range & Units Status    WBC 7.7 4.0 - 11.0 10e9/L Final    RBC Count 3.74 (L) 3.8 - 5.2 10e12/L Final    Hemoglobin 11.3 (L) 11.7 - 15.7 g/dL Final    Hematocrit 34.2 (L) 35.0 - 47.0 % Final    MCV 91 78 - 100 fl Final    MCH 30.2 26.5 - 33.0 pg Final    MCHC 33.0 31.5 -  36.5 g/dL Final    RDW 13.3 10.0 - 15.0 % Final    Platelet Count 307 150 - 450 10e9/L Final    Diff Method Automated Method  Final    % Neutrophils 61.0 % Final    % Lymphocytes 24.2 % Final    % Monocytes 11.5 % Final    % Eosinophils 2.5 % Final    % Basophils 0.4 % Final    % Immature Granulocytes 0.4 % Final    Nucleated RBCs 0 0 /100 Final    Absolute Neutrophil 4.7 1.6 - 8.3 10e9/L Final    Absolute Lymphocytes 1.9 0.8 - 5.3 10e9/L Final    Absolute Monocytes 0.9 0.0 - 1.3 10e9/L Final    Absolute Eosinophils 0.2 0.0 - 0.7 10e9/L Final    Absolute Basophils 0.0 0.0 - 0.2 10e9/L Final    Abs Immature Granulocytes 0.0 0 - 0.4 10e9/L Final    Absolute Nucleated RBC 0.0  Final         11/21/2017  3:45 PM      Component Results     Component Value Ref Range & Units Status    Sodium 136 133 - 144 mmol/L Final    Potassium 4.2 3.4 - 5.3 mmol/L Final    Chloride 102 94 - 109 mmol/L Final    Carbon Dioxide 27 20 - 32 mmol/L Final    Anion Gap 7 3 - 14 mmol/L Final    Glucose 93 70 - 99 mg/dL Final    Urea Nitrogen 15 7 - 30 mg/dL Final    Creatinine 1.05 (H) 0.52 - 1.04 mg/dL Final    GFR Estimate 49 (L) >60 mL/min/1.7m2 Final    Non  GFR Calc    GFR Estimate If Black 59 (L) >60 mL/min/1.7m2 Final    African American GFR Calc    Calcium 9.2 8.5 - 10.1 mg/dL Final         11/21/2017  3:49 PM      Component Results     Component Value Ref Range & Units Status    Troponin I ES <0.015 0.000 - 0.045 ug/L Final    The 99th percentile for upper reference range is 0.045 ug/L.  Troponin values   in the range of 0.045 - 0.120 ug/L may be associated with risks of adverse   clinical events.           11/21/2017  3:35 PM      Narrative     XR PELVIS 1/2 VW 11/21/2017 3:24 PM    HISTORY: Pain.    COMPARISON: None.        Impression     IMPRESSION: No evidence of acute fracture or malalignment.    DEBORA DE LEON MD         11/21/2017  3:53 PM      Narrative     CT SCAN OF THE HEAD WITHOUT CONTRAST   11/21/2017 3:42 PM      HISTORY: Fall, head injury. Left forehead contusion.    TECHNIQUE:  Axial images of the head and coronal reformations without  IV contrast material. Radiation dose for this scan was reduced using  automated exposure control, adjustment of the mA and/or kV according  to patient size, or iterative reconstruction technique.    COMPARISON: 3/30/2015    FINDINGS:  There is generalized atrophy of the brain.  There is low  attenuation in the white matter of the cerebral hemispheres consistent  with sequelae of small vessel ischemic disease. There is no evidence  of intracranial hemorrhage, mass, acute infarct or anomaly.     The visualized portions of the sinuses and mastoids appear normal.  There is no evidence of trauma.         Impression     IMPRESSION:   1. No acute abnormality.  2. Atrophy of the brain.  White matter changes consistent with  sequelae of small vessel ischemic disease. This is unchanged.      ROMIE DAMIAN MD                Clinical Quality Measure: Blood Pressure Screening     Your blood pressure was checked while you were in the emergency department today. The last reading we obtained was  BP: (!) 175/98 . Please read the guidelines below about what these numbers mean and what you should do about them.  If your systolic blood pressure (the top number) is less than 120 and your diastolic blood pressure (the bottom number) is less than 80, then your blood pressure is normal. There is nothing more that you need to do about it.  If your systolic blood pressure (the top number) is 120-139 or your diastolic blood pressure (the bottom number) is 80-89, your blood pressure may be higher than it should be. You should have your blood pressure rechecked within a year by a primary care provider.  If your systolic blood pressure (the top number) is 140 or greater or your diastolic blood pressure (the bottom number) is 90 or greater, you may have high blood pressure. High blood pressure is treatable, but if  "left untreated over time it can put you at risk for heart attack, stroke, or kidney failure. You should have your blood pressure rechecked by a primary care provider within the next 4 weeks.  If your provider in the emergency department today gave you specific instructions to follow-up with your doctor or provider even sooner than that, you should follow that instruction and not wait for up to 4 weeks for your follow-up visit.        Thank you for choosing [facility]       Thank you for choosing [facility] for your care. Our goal is always to provide you with excellent care. Hearing back from our patients is one way we can continue to improve our services. Please take a few minutes to complete the written survey that you may receive in the mail after you visit with us. Thank you!        [facility] Information     [employer] lets you send messages to your doctor, view your test results, renew your prescriptions, schedule appointments and more. To sign up, go to [email or web address] . Click on \"Log in\" on the left side of the screen, which will take you to the Welcome page. Then click on \"Sign up Now\" on the right side of the page.     You will be asked to enter the access code listed below, as well as some personal information. Please follow the directions to create your username and password.     Your access code is: [email or web address]  Expires: 2018  3:30 PM     Your access code will  in 90 days. If you need help or a new code, please call your [facility] clinic or [phone].        Care EveryWhere ID     This is your Care EveryWhere ID. This could be used by other organizations to access your [facility] medical records  [id number]        Equal Access to Services     [facility] : Hadii avila Martin, wabyron chanel, qaybpaige cross. So [facility] [phone].    ATENCIÓN: Si habla español, tiene a jara disposición servicios gratuitos de asistencia " alem Perezyisel al [phone].    We comply with applicable federal civil rights laws and [place] laws. We do not discriminate on the basis of race, color, national origin, age, disability, sex, sexual orientation, or gender identity.            After Visit Summary       This is your record. Keep this with you and show to your community pharmacist(s) and doctor(s) at your next visit.

## 2017-11-21 NOTE — DISCHARGE INSTRUCTIONS
*No sports or activities that put you at risk for a repeat head injury until you have been without symptoms for 1 week. Use your walker every time.  *Tylenol as directed as needed for pain.  *Follow-up with your doctor for a recheck in 2-3 days.  *Return if you develop worsening headache, recurrent vomiting, seizures, neurologic changes or become worse in any way.      Discharge Instructions  Head Injury    You have been seen today for a head injury. You were checked for serious problems, like bleeding on the brain, but these problems cannot always be found right away.  Due to this risk, you should not be alone for 24 hours after your injury.  Follow up with your regular physician in 2-3 days. If you are taking a blood thinner, such as aspirin, Pradaxa  (dabigatran), Coumadin  (warfarin), or Plavix  (clopidogrel), you are at especially high risk for immediate or delayed bleeding, and need to re-check with a physician in 24 hours, or sooner if any of the symptoms below happen.     Return to the Emergency Department if:    You are confused, have amnesia, or you are not acting right.    Your headache gets worse or you start to have a really bad headache even with your recommended treatment plan.    You vomit more than once.    You have a convulsion or seizure.    You have trouble walking.    You have weakness or paralysis in an arm or a leg.    You have blood or fluid coming from your ears or nose.    You have new symptoms or anything that worries you.    Sleeping:  It is okay for you to sleep, but someone should wake you up as instructed by your doctor, and someone should check on you at your usual time to wake up.     Activity:    Do not drive for at least 24 hours.    Do not drive if you have dizzy spells or trouble concentrating, or remembering things.    Do not return to any contact sports until cleared by your regular doctor.     Follow-up:  It is very important that you make an appointment with your clinic and  go to the appointment.  If you do not follow-up with your regular doctor, it may result in missing an important development which could result in permanent injury or disability and/or lasting pain.  If there is any problem keeping your appointment, call your doctor or return to the Emergency Department.    MORE INFORMATION:    Concussion:  A concussion is a minor head injury that may cause temporary problems with the way your brain works.  Some symptoms include:  confusion, amnesia, nausea and vomiting, dizziness, fatigue, memory or concentration problems, irritability and sleep problems.    CT Scans: Your evaluation today may have included a CT scan (CAT scan) to look for things like bleeding or a skull fracture (break).  CT scans involve radiation and too many CT scans can cause serious health problems like cancer, especially in children.  Because of this, your doctor may not have ordered a CT scan today if they think you are at low risk for a serious or life threatening problem.    If you were given a prescription for medicine here today, be sure to read all of the information (including the package insert) that comes with your prescription.  This will include important information about the medicine, its side effects, and any warnings that you need to know about.  The pharmacist who fills the prescription can provide more information and answer questions you may have about the medicine.  If you have questions or concerns that the pharmacist cannot address, please call or return to the Emergency Department.     Opioid Medication Information    Pain medications are among the most commonly prescribed medicines, so we are including this information for all our patients. If you did not receive pain medication or get a prescription for pain medicine, you can ignore it.     You may have been given a prescription for an opioid (narcotic) pain medicine and/or have received a pain medicine while here in the Emergency  Department. These medicines can make you drowsy or impaired. You must not drive, operate dangerous equipment, or engage in any other dangerous activities while taking these medications. If you drive while taking these medications, you could be arrested for DUI, or driving under the influence. Do not drink any alcohol while you are taking these medications.     Opioid pain medications can cause addiction. If you have a history of chemical dependency of any type, you are at a higher risk of becoming addicted to pain medications.  Only take these prescribed medications to treat your pain when all other options have been tried. Take it for as short a time and as few doses as possible. Store your pain pills in a secure place, as they are frequently stolen and provide a dangerous opportunity for children or visitors in your house to start abusing these powerful medications. We will not replace any lost or stolen medicine.  As soon as your pain is better, you should flush all your remaining medication.     Many prescription pain medications contain Tylenol  (acetaminophen), including Vicodin , Tylenol #3 , Norco , Lortab , and Percocet .  You should not take any extra pills of Tylenol  if you are using these prescription medications or you can get very sick.  Do not ever take more than 3000 mg of acetaminophen in any 24 hour period.    All opioids tend to cause constipation. Drink plenty of water and eat foods that have a lot of fiber, such as fruits, vegetables, prune juice, apple juice and high fiber cereal.  Take a laxative if you don t move your bowels at least every other day. Miralax , Milk of Magnesia, Colace , or Senna  can be used to keep you regular.      Remember that you can always come back to the Emergency Department if you are not able to see your regular doctor in the amount of time listed above, if you get any new symptoms, or if there is anything that worries you.            Understanding Bone Bruise  (Bone Contusion)  A bone bruise is an injury to a bone that is less severe than a bone fracture. Bone bruises are fairly common. They can happen to people of all ages. Any type of bone in your body can be bruised. Other injuries often happen along with a bone bruise, such as damage to nearby ligaments.  What happens when a bone is bruised?  Bone is made of different kinds of tissue. The periosteum is a thin layer of tissue that covers most of a bone. Where bones come together, there is usually a layer of cartilage at the edges. The bone here is called subchondral bone. Deep inside the bone is an area called the medulla. It contains the bone marrow and fibrous tissue called trabeculae.  With a bone fracture, all of the trabeculae in a region of bone have broken. But with a bone bruise, an injury only damages some of these trabeculae. An injury might cause blood to build up in the area beneath the periosteum. This causes a subperiosteal hematoma, a type of bone bruise. An injury might also cause bleeding and swelling in the area between your cartilage and the bone beneath it. This causes a subchondral bone bruise. Or bleeding and swelling can occur in the medulla of your bone. This is called an intraosseous bone bruise.  What causes a bone bruise?  Injury of any kind can cause a bone bruise. Sports injuries, motor vehicle accidents, or falls from a height can cause them. Twisting injuries that cause joint sprains can also cause a bone bruise. Health conditions like arthritis may also lead to a bone bruise. This is because arthritis causes bone surfaces to grind against each other. Child abuse is another cause of bone bruises.  Symptoms of a bone bruise  Symptoms of a bone bruise can include:    Pain and soreness in the injured area    Swelling in the area and soft tissues around it    Change in color of the injured area    Swelling or stiffness of an injured joint  This pain is often more severe and lasts longer than  a soft tissue injury. How severe your symptoms are and how long they last depends on how severe the bone bruise is.  Diagnosing a bone bruise  Your healthcare provider will ask you about your medical history and symptoms. He or she will ask how you got your injury. Your provider will examine the injured area to check for pain, bruising, and swelling. After the exam, your health care provider may be able to tell if you have a bone bruise.  A bone bruise doesn t show up on an X-ray. But you may be given an X-ray to rule out a bone fracture. A fracture may need a different kind of treatment. An MRI can confirm a bone bruise. But your healthcare provider will likely only give you an MRI if your symptoms don t get better.  Date Last Reviewed: 4/1/2017 2000-2017 [employer]. [address] [place] [address], [place] [address]. All rights reserved. This information is not intended as a substitute for professional medical care. Always follow your healthcare professional's instructions.

## 2017-11-21 NOTE — ED NOTES
Pt up walking in hallways with walker. Denies dizziness. Steady. C/O right lower back pain. Pt got SOB on way back to room. States she has asthma and did not bring her inhaler here. Order received for albuterol neb.

## 2017-11-22 NOTE — ED NOTES
"O2 sat 88-90 after walk, room air. Pt states she feels \"winded\" after walking. States she has asthma. RR 24, slightly labored.  "

## 2019-03-11 ENCOUNTER — APPOINTMENT (OUTPATIENT)
Dept: CT IMAGING | Facility: CLINIC | Age: 84
DRG: 871 | End: 2019-03-11
Attending: EMERGENCY MEDICINE
Payer: MEDICARE

## 2019-03-11 ENCOUNTER — HOSPITAL ENCOUNTER (INPATIENT)
Facility: CLINIC | Age: 84
LOS: 5 days | Discharge: NURSING, REHAB OR LONG-TERM CARE | DRG: 871 | End: 2019-03-16
Attending: EMERGENCY MEDICINE | Admitting: INTERNAL MEDICINE
Payer: MEDICARE

## 2019-03-11 ENCOUNTER — APPOINTMENT (OUTPATIENT)
Dept: GENERAL RADIOLOGY | Facility: CLINIC | Age: 84
DRG: 871 | End: 2019-03-11
Attending: EMERGENCY MEDICINE
Payer: MEDICARE

## 2019-03-11 ENCOUNTER — APPOINTMENT (OUTPATIENT)
Dept: SPEECH THERAPY | Facility: CLINIC | Age: 84
DRG: 871 | End: 2019-03-11
Attending: PHYSICIAN ASSISTANT
Payer: MEDICARE

## 2019-03-11 DIAGNOSIS — J18.9 PNEUMONIA OF RIGHT UPPER LOBE DUE TO INFECTIOUS ORGANISM: ICD-10-CM

## 2019-03-11 DIAGNOSIS — N39.0 UTI (URINARY TRACT INFECTION) WITH PYURIA: ICD-10-CM

## 2019-03-11 DIAGNOSIS — R79.89 ELEVATED TROPONIN: ICD-10-CM

## 2019-03-11 DIAGNOSIS — R00.0 TACHYCARDIA: ICD-10-CM

## 2019-03-11 DIAGNOSIS — E87.1 HYPONATREMIA: ICD-10-CM

## 2019-03-11 DIAGNOSIS — I10 ESSENTIAL HYPERTENSION: ICD-10-CM

## 2019-03-11 DIAGNOSIS — I24.9 ACS (ACUTE CORONARY SYNDROME) (H): ICD-10-CM

## 2019-03-11 DIAGNOSIS — H10.9 BACTERIAL CONJUNCTIVITIS OF LEFT EYE: Primary | ICD-10-CM

## 2019-03-11 DIAGNOSIS — M51.16 LUMBAR DISC DISEASE WITH RADICULOPATHY: ICD-10-CM
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
## 2019-03-11 PROBLEM — A41.9 SEPSIS (H): Status: ACTIVE | Noted: 2019-03-11

## 2019-03-11 LAB
ALBUMIN SERPL-MCNC: 3 G/DL (ref 3.4–5)
ALBUMIN UR-MCNC: 30 MG/DL
ALP SERPL-CCNC: 117 U/L (ref 40–150)
ALT SERPL W P-5'-P-CCNC: 29 U/L (ref 0–50)
ANION GAP SERPL CALCULATED.3IONS-SCNC: 10 MMOL/L (ref 3–14)
ANION GAP SERPL CALCULATED.3IONS-SCNC: 10 MMOL/L (ref 3–14)
ANION GAP SERPL CALCULATED.3IONS-SCNC: 12 MMOL/L (ref 3–14)
APPEARANCE UR: ABNORMAL
AST SERPL W P-5'-P-CCNC: 78 U/L (ref 0–45)
BACTERIA #/AREA URNS HPF: ABNORMAL /HPF
BASOPHILS # BLD AUTO: 0 10E9/L (ref 0–0.2)
BASOPHILS NFR BLD AUTO: 0.1 %
BILIRUB SERPL-MCNC: 0.9 MG/DL (ref 0.2–1.3)
BILIRUB UR QL STRIP: NEGATIVE
BUN SERPL-MCNC: 27 MG/DL (ref 7–30)
BUN SERPL-MCNC: 28 MG/DL (ref 7–30)
BUN SERPL-MCNC: 30 MG/DL (ref 7–30)
CALCIUM SERPL-MCNC: 8.1 MG/DL (ref 8.5–10.1)
CALCIUM SERPL-MCNC: 8.2 MG/DL (ref 8.5–10.1)
CALCIUM SERPL-MCNC: 8.5 MG/DL (ref 8.5–10.1)
CHLORIDE SERPL-SCNC: 90 MMOL/L (ref 94–109)
CHLORIDE SERPL-SCNC: 96 MMOL/L (ref 94–109)
CHLORIDE SERPL-SCNC: 98 MMOL/L (ref 94–109)
CK SERPL-CCNC: 233 U/L (ref 30–225)
CO2 SERPL-SCNC: 20 MMOL/L (ref 20–32)
CO2 SERPL-SCNC: 20 MMOL/L (ref 20–32)
CO2 SERPL-SCNC: 23 MMOL/L (ref 20–32)
COLOR UR AUTO: YELLOW
CREAT SERPL-MCNC: 1.33 MG/DL (ref 0.52–1.04)
CREAT SERPL-MCNC: 1.41 MG/DL (ref 0.52–1.04)
CREAT SERPL-MCNC: 1.46 MG/DL (ref 0.52–1.04)
DIFFERENTIAL METHOD BLD: ABNORMAL
EOSINOPHIL # BLD AUTO: 0 10E9/L (ref 0–0.7)
EOSINOPHIL NFR BLD AUTO: 0.1 %
ERYTHROCYTE [DISTWIDTH] IN BLOOD BY AUTOMATED COUNT: 13.6 % (ref 10–15)
FLUAV+FLUBV AG SPEC QL: NEGATIVE
FLUAV+FLUBV AG SPEC QL: NEGATIVE
GFR SERPL CREATININE-BSD FRML MDRD: 31 ML/MIN/{1.73_M2}
GFR SERPL CREATININE-BSD FRML MDRD: 32 ML/MIN/{1.73_M2}
GFR SERPL CREATININE-BSD FRML MDRD: 34 ML/MIN/{1.73_M2}
GLUCOSE SERPL-MCNC: 106 MG/DL (ref 70–99)
GLUCOSE SERPL-MCNC: 122 MG/DL (ref 70–99)
GLUCOSE SERPL-MCNC: 96 MG/DL (ref 70–99)
GLUCOSE UR STRIP-MCNC: NEGATIVE MG/DL
HCT VFR BLD AUTO: 39.9 % (ref 35–47)
HGB BLD-MCNC: 13.6 G/DL (ref 11.7–15.7)
HGB UR QL STRIP: ABNORMAL
HYALINE CASTS #/AREA URNS LPF: 3 /LPF (ref 0–2)
IMM GRANULOCYTES # BLD: 0.1 10E9/L (ref 0–0.4)
IMM GRANULOCYTES NFR BLD: 0.4 %
INR PPP: 1.08 (ref 0.86–1.14)
KETONES UR STRIP-MCNC: 5 MG/DL
LACTATE BLD-SCNC: 1.4 MMOL/L (ref 0.7–2)
LEUKOCYTE ESTERASE UR QL STRIP: ABNORMAL
LYMPHOCYTES # BLD AUTO: 1 10E9/L (ref 0.8–5.3)
LYMPHOCYTES NFR BLD AUTO: 6.9 %
MAGNESIUM SERPL-MCNC: 2 MG/DL (ref 1.6–2.3)
MCH RBC QN AUTO: 30.5 PG (ref 26.5–33)
MCHC RBC AUTO-ENTMCNC: 34.1 G/DL (ref 31.5–36.5)
MCV RBC AUTO: 90 FL (ref 78–100)
MONOCYTES # BLD AUTO: 0.8 10E9/L (ref 0–1.3)
MONOCYTES NFR BLD AUTO: 5.4 %
MUCOUS THREADS #/AREA URNS LPF: PRESENT /LPF
NEUTROPHILS # BLD AUTO: 13.2 10E9/L (ref 1.6–8.3)
NEUTROPHILS NFR BLD AUTO: 87.1 %
NITRATE UR QL: POSITIVE
NRBC # BLD AUTO: 0 10*3/UL
NRBC BLD AUTO-RTO: 0 /100
NT-PROBNP SERPL-MCNC: ABNORMAL PG/ML (ref 0–1800)
NT-PROBNP SERPL-MCNC: ABNORMAL PG/ML (ref 0–1800)
OSMOLALITY SERPL: 273 MMOL/KG (ref 280–301)
PH UR STRIP: 6 PH (ref 5–7)
PHOSPHATE SERPL-MCNC: 4.4 MG/DL (ref 2.5–4.5)
PLATELET # BLD AUTO: 247 10E9/L (ref 150–450)
POTASSIUM SERPL-SCNC: 4.4 MMOL/L (ref 3.4–5.3)
POTASSIUM SERPL-SCNC: 4.9 MMOL/L (ref 3.4–5.3)
POTASSIUM SERPL-SCNC: 5.2 MMOL/L (ref 3.4–5.3)
PROT SERPL-MCNC: 7 G/DL (ref 6.8–8.8)
RBC # BLD AUTO: 4.46 10E12/L (ref 3.8–5.2)
RBC #/AREA URNS AUTO: 1 /HPF (ref 0–2)
SODIUM SERPL-SCNC: 125 MMOL/L (ref 133–144)
SODIUM SERPL-SCNC: 126 MMOL/L (ref 133–144)
SODIUM SERPL-SCNC: 127 MMOL/L (ref 133–144)
SODIUM SERPL-SCNC: 128 MMOL/L (ref 133–144)
SOURCE: ABNORMAL
SP GR UR STRIP: 1.01 (ref 1–1.03)
SPECIMEN SOURCE: NORMAL
T4 FREE SERPL-MCNC: 1.01 NG/DL (ref 0.76–1.46)
TROPONIN I SERPL-MCNC: 3.13 UG/L (ref 0–0.04)
TROPONIN I SERPL-MCNC: 4.85 UG/L (ref 0–0.04)
TROPONIN I SERPL-MCNC: 4.96 UG/L (ref 0–0.04)
TSH SERPL DL<=0.005 MIU/L-ACNC: 10.85 MU/L (ref 0.4–4)
UROBILINOGEN UR STRIP-MCNC: NORMAL MG/DL (ref 0–2)
WBC # BLD AUTO: 15.2 10E9/L (ref 4–11)
WBC #/AREA URNS AUTO: 21 /HPF (ref 0–5)
WBC CLUMPS #/AREA URNS HPF: PRESENT /HPF

## 2019-03-11 PROCEDURE — 93010 ELECTROCARDIOGRAM REPORT: CPT | Performed by: INTERNAL MEDICINE

## 2019-03-11 PROCEDURE — 87086 URINE CULTURE/COLONY COUNT: CPT | Performed by: EMERGENCY MEDICINE

## 2019-03-11 PROCEDURE — 25000128 H RX IP 250 OP 636: Performed by: INTERNAL MEDICINE

## 2019-03-11 PROCEDURE — 83735 ASSAY OF MAGNESIUM: CPT | Performed by: EMERGENCY MEDICINE

## 2019-03-11 PROCEDURE — 99207 ZZC APP CREDIT; MD BILLING SHARED VISIT: CPT | Performed by: PHYSICIAN ASSISTANT

## 2019-03-11 PROCEDURE — 96365 THER/PROPH/DIAG IV INF INIT: CPT

## 2019-03-11 PROCEDURE — 25000125 ZZHC RX 250: Performed by: PHYSICIAN ASSISTANT

## 2019-03-11 PROCEDURE — 25000128 H RX IP 250 OP 636: Performed by: PHYSICIAN ASSISTANT

## 2019-03-11 PROCEDURE — 84295 ASSAY OF SERUM SODIUM: CPT | Performed by: PHYSICIAN ASSISTANT

## 2019-03-11 PROCEDURE — 36415 COLL VENOUS BLD VENIPUNCTURE: CPT | Performed by: PHYSICIAN ASSISTANT

## 2019-03-11 PROCEDURE — 87040 BLOOD CULTURE FOR BACTERIA: CPT | Performed by: EMERGENCY MEDICINE

## 2019-03-11 PROCEDURE — 25800030 ZZH RX IP 258 OP 636: Performed by: EMERGENCY MEDICINE

## 2019-03-11 PROCEDURE — 92526 ORAL FUNCTION THERAPY: CPT | Mod: GN | Performed by: SPEECH-LANGUAGE PATHOLOGIST

## 2019-03-11 PROCEDURE — 80053 COMPREHEN METABOLIC PANEL: CPT | Performed by: EMERGENCY MEDICINE

## 2019-03-11 PROCEDURE — 25000125 ZZHC RX 250: Performed by: EMERGENCY MEDICINE

## 2019-03-11 PROCEDURE — 94640 AIRWAY INHALATION TREATMENT: CPT | Mod: 76

## 2019-03-11 PROCEDURE — 85025 COMPLETE CBC W/AUTO DIFF WBC: CPT | Performed by: EMERGENCY MEDICINE

## 2019-03-11 PROCEDURE — 83605 ASSAY OF LACTIC ACID: CPT | Performed by: PHYSICIAN ASSISTANT

## 2019-03-11 PROCEDURE — A9270 NON-COVERED ITEM OR SERVICE: HCPCS | Mod: GY | Performed by: INTERNAL MEDICINE

## 2019-03-11 PROCEDURE — 84100 ASSAY OF PHOSPHORUS: CPT | Performed by: EMERGENCY MEDICINE

## 2019-03-11 PROCEDURE — 40000275 ZZH STATISTIC RCP TIME EA 10 MIN

## 2019-03-11 PROCEDURE — 25000128 H RX IP 250 OP 636: Performed by: NURSE PRACTITIONER

## 2019-03-11 PROCEDURE — 99291 CRITICAL CARE FIRST HOUR: CPT | Performed by: NURSE PRACTITIONER

## 2019-03-11 PROCEDURE — 81001 URINALYSIS AUTO W/SCOPE: CPT | Performed by: EMERGENCY MEDICINE

## 2019-03-11 PROCEDURE — 94640 AIRWAY INHALATION TREATMENT: CPT

## 2019-03-11 PROCEDURE — 72125 CT NECK SPINE W/O DYE: CPT

## 2019-03-11 PROCEDURE — 87804 INFLUENZA ASSAY W/OPTIC: CPT | Performed by: EMERGENCY MEDICINE

## 2019-03-11 PROCEDURE — 25000128 H RX IP 250 OP 636: Performed by: EMERGENCY MEDICINE

## 2019-03-11 PROCEDURE — 25000125 ZZHC RX 250: Performed by: NURSE PRACTITIONER

## 2019-03-11 PROCEDURE — 96375 TX/PRO/DX INJ NEW DRUG ADDON: CPT

## 2019-03-11 PROCEDURE — 21000001 ZZH R&B HEART CARE

## 2019-03-11 PROCEDURE — 25000132 ZZH RX MED GY IP 250 OP 250 PS 637: Mod: GY | Performed by: INTERNAL MEDICINE

## 2019-03-11 PROCEDURE — 83880 ASSAY OF NATRIURETIC PEPTIDE: CPT | Performed by: EMERGENCY MEDICINE

## 2019-03-11 PROCEDURE — 84443 ASSAY THYROID STIM HORMONE: CPT | Performed by: EMERGENCY MEDICINE

## 2019-03-11 PROCEDURE — 92610 EVALUATE SWALLOWING FUNCTION: CPT | Mod: GN | Performed by: SPEECH-LANGUAGE PATHOLOGIST

## 2019-03-11 PROCEDURE — 87088 URINE BACTERIA CULTURE: CPT | Performed by: EMERGENCY MEDICINE

## 2019-03-11 PROCEDURE — 87186 SC STD MICRODIL/AGAR DIL: CPT | Performed by: EMERGENCY MEDICINE

## 2019-03-11 PROCEDURE — 93005 ELECTROCARDIOGRAM TRACING: CPT

## 2019-03-11 PROCEDURE — 70450 CT HEAD/BRAIN W/O DYE: CPT

## 2019-03-11 PROCEDURE — 83930 ASSAY OF BLOOD OSMOLALITY: CPT | Performed by: PHYSICIAN ASSISTANT

## 2019-03-11 PROCEDURE — 99285 EMERGENCY DEPT VISIT HI MDM: CPT | Mod: 25

## 2019-03-11 PROCEDURE — 99222 1ST HOSP IP/OBS MODERATE 55: CPT | Performed by: INTERNAL MEDICINE

## 2019-03-11 PROCEDURE — 80048 BASIC METABOLIC PNL TOTAL CA: CPT | Performed by: PHYSICIAN ASSISTANT

## 2019-03-11 PROCEDURE — 71045 X-RAY EXAM CHEST 1 VIEW: CPT

## 2019-03-11 PROCEDURE — 85610 PROTHROMBIN TIME: CPT | Performed by: EMERGENCY MEDICINE

## 2019-03-11 PROCEDURE — 25800030 ZZH RX IP 258 OP 636: Performed by: PHYSICIAN ASSISTANT

## 2019-03-11 PROCEDURE — 84484 ASSAY OF TROPONIN QUANT: CPT | Performed by: PHYSICIAN ASSISTANT

## 2019-03-11 PROCEDURE — 25000128 H RX IP 250 OP 636

## 2019-03-11 PROCEDURE — 82550 ASSAY OF CK (CPK): CPT | Performed by: EMERGENCY MEDICINE

## 2019-03-11 PROCEDURE — 83880 ASSAY OF NATRIURETIC PEPTIDE: CPT | Performed by: PHYSICIAN ASSISTANT

## 2019-03-11 PROCEDURE — 96366 THER/PROPH/DIAG IV INF ADDON: CPT

## 2019-03-11 PROCEDURE — 84484 ASSAY OF TROPONIN QUANT: CPT | Performed by: EMERGENCY MEDICINE

## 2019-03-11 PROCEDURE — 96361 HYDRATE IV INFUSION ADD-ON: CPT

## 2019-03-11 PROCEDURE — 25000125 ZZHC RX 250

## 2019-03-11 PROCEDURE — 94660 CPAP INITIATION&MGMT: CPT

## 2019-03-11 PROCEDURE — 84484 ASSAY OF TROPONIN QUANT: CPT | Performed by: INTERNAL MEDICINE

## 2019-03-11 PROCEDURE — 80048 BASIC METABOLIC PNL TOTAL CA: CPT | Performed by: INTERNAL MEDICINE

## 2019-03-11 PROCEDURE — 99223 1ST HOSP IP/OBS HIGH 75: CPT | Mod: AI | Performed by: INTERNAL MEDICINE

## 2019-03-11 PROCEDURE — 84439 ASSAY OF FREE THYROXINE: CPT | Performed by: EMERGENCY MEDICINE

## 2019-03-11 PROCEDURE — A9270 NON-COVERED ITEM OR SERVICE: HCPCS | Performed by: PHYSICIAN ASSISTANT

## 2019-03-11 RX ORDER — METOPROLOL SUCCINATE 25 MG/1
25 TABLET, EXTENDED RELEASE ORAL DAILY
Status: DISCONTINUED | OUTPATIENT
Start: 2019-03-12 | End: 2019-03-12

## 2019-03-11 RX ORDER — SODIUM CHLORIDE 9 MG/ML
INJECTION, SOLUTION INTRAVENOUS CONTINUOUS
Status: DISCONTINUED | OUTPATIENT
Start: 2019-03-11 | End: 2019-03-11

## 2019-03-11 RX ORDER — POLYETHYLENE GLYCOL 3350 17 G/17G
17 POWDER, FOR SOLUTION ORAL DAILY PRN
Status: DISCONTINUED | OUTPATIENT
Start: 2019-03-11 | End: 2019-03-16 | Stop reason: HOSPADM

## 2019-03-11 RX ORDER — LEVALBUTEROL 1.25 MG/.5ML
1.25 SOLUTION, CONCENTRATE RESPIRATORY (INHALATION) EVERY 6 HOURS PRN
Status: DISCONTINUED | OUTPATIENT
Start: 2019-03-11 | End: 2019-03-12

## 2019-03-11 RX ORDER — CITALOPRAM HYDROBROMIDE 20 MG/1
40 TABLET ORAL DAILY
Status: DISCONTINUED | OUTPATIENT
Start: 2019-03-12 | End: 2019-03-16 | Stop reason: HOSPADM

## 2019-03-11 RX ORDER — CEFTRIAXONE 1 G/1
1 INJECTION, POWDER, FOR SOLUTION INTRAMUSCULAR; INTRAVENOUS ONCE
Status: COMPLETED | OUTPATIENT
Start: 2019-03-11 | End: 2019-03-11

## 2019-03-11 RX ORDER — HYDROCODONE BITARTRATE AND ACETAMINOPHEN 10; 325 MG/1; MG/1
1 TABLET ORAL 3 TIMES DAILY PRN
Status: ON HOLD | COMMUNITY
End: 2019-03-16

## 2019-03-11 RX ORDER — PROCHLORPERAZINE 25 MG
12.5 SUPPOSITORY, RECTAL RECTAL EVERY 12 HOURS PRN
Status: DISCONTINUED | OUTPATIENT
Start: 2019-03-11 | End: 2019-03-16 | Stop reason: HOSPADM

## 2019-03-11 RX ORDER — ACETAMINOPHEN 650 MG/1
650 SUPPOSITORY RECTAL EVERY 4 HOURS PRN
Status: DISCONTINUED | OUTPATIENT
Start: 2019-03-11 | End: 2019-03-11

## 2019-03-11 RX ORDER — HYDROCODONE BITARTRATE AND ACETAMINOPHEN 10; 325 MG/1; MG/1
1 TABLET ORAL 3 TIMES DAILY PRN
Status: DISCONTINUED | OUTPATIENT
Start: 2019-03-11 | End: 2019-03-16 | Stop reason: HOSPADM

## 2019-03-11 RX ORDER — GABAPENTIN 300 MG/1
900 CAPSULE ORAL AT BEDTIME
Status: DISCONTINUED | OUTPATIENT
Start: 2019-03-11 | End: 2019-03-11

## 2019-03-11 RX ORDER — FUROSEMIDE 10 MG/ML
20 INJECTION INTRAMUSCULAR; INTRAVENOUS ONCE
Status: COMPLETED | OUTPATIENT
Start: 2019-03-11 | End: 2019-03-11

## 2019-03-11 RX ORDER — AMLODIPINE BESYLATE 5 MG/1
5 TABLET ORAL DAILY
Status: DISCONTINUED | OUTPATIENT
Start: 2019-03-12 | End: 2019-03-16 | Stop reason: HOSPADM

## 2019-03-11 RX ORDER — LEVALBUTEROL 1.25 MG/.5ML
1.25 SOLUTION, CONCENTRATE RESPIRATORY (INHALATION)
Status: DISCONTINUED | OUTPATIENT
Start: 2019-03-11 | End: 2019-03-16 | Stop reason: HOSPADM

## 2019-03-11 RX ORDER — METOPROLOL TARTRATE 1 MG/ML
5 INJECTION, SOLUTION INTRAVENOUS
Status: COMPLETED | OUTPATIENT
Start: 2019-03-11 | End: 2019-03-11

## 2019-03-11 RX ORDER — ONDANSETRON 2 MG/ML
4 INJECTION INTRAMUSCULAR; INTRAVENOUS EVERY 6 HOURS PRN
Status: DISCONTINUED | OUTPATIENT
Start: 2019-03-11 | End: 2019-03-16 | Stop reason: HOSPADM

## 2019-03-11 RX ORDER — IPRATROPIUM BROMIDE AND ALBUTEROL SULFATE 2.5; .5 MG/3ML; MG/3ML
3 SOLUTION RESPIRATORY (INHALATION) ONCE
Status: COMPLETED | OUTPATIENT
Start: 2019-03-11 | End: 2019-03-11

## 2019-03-11 RX ORDER — PROCHLORPERAZINE MALEATE 5 MG
5 TABLET ORAL EVERY 6 HOURS PRN
Status: DISCONTINUED | OUTPATIENT
Start: 2019-03-11 | End: 2019-03-16 | Stop reason: HOSPADM

## 2019-03-11 RX ORDER — METOPROLOL TARTRATE 1 MG/ML
2.5 INJECTION, SOLUTION INTRAVENOUS EVERY 6 HOURS PRN
Status: DISCONTINUED | OUTPATIENT
Start: 2019-03-11 | End: 2019-03-12

## 2019-03-11 RX ORDER — CIPROFLOXACIN HYDROCHLORIDE 3.5 MG/ML
1 SOLUTION/ DROPS TOPICAL 2 TIMES DAILY
Status: DISCONTINUED | OUTPATIENT
Start: 2019-03-11 | End: 2019-03-11 | Stop reason: RX

## 2019-03-11 RX ORDER — AMOXICILLIN 250 MG
1 CAPSULE ORAL 2 TIMES DAILY PRN
Status: DISCONTINUED | OUTPATIENT
Start: 2019-03-11 | End: 2019-03-16 | Stop reason: HOSPADM

## 2019-03-11 RX ORDER — LIDOCAINE 40 MG/G
CREAM TOPICAL
Status: DISCONTINUED | OUTPATIENT
Start: 2019-03-11 | End: 2019-03-12

## 2019-03-11 RX ORDER — FUROSEMIDE 10 MG/ML
80 INJECTION INTRAMUSCULAR; INTRAVENOUS ONCE
Status: COMPLETED | OUTPATIENT
Start: 2019-03-11 | End: 2019-03-11

## 2019-03-11 RX ORDER — LEVOFLOXACIN 5 MG/ML
750 INJECTION, SOLUTION INTRAVENOUS EVERY 24 HOURS
Status: DISCONTINUED | OUTPATIENT
Start: 2019-03-11 | End: 2019-03-11 | Stop reason: DRUGHIGH

## 2019-03-11 RX ORDER — ACETAMINOPHEN 650 MG/1
650 SUPPOSITORY RECTAL EVERY 4 HOURS PRN
Status: DISCONTINUED | OUTPATIENT
Start: 2019-03-11 | End: 2019-03-16 | Stop reason: HOSPADM

## 2019-03-11 RX ORDER — NALOXONE HYDROCHLORIDE 0.4 MG/ML
.1-.4 INJECTION, SOLUTION INTRAMUSCULAR; INTRAVENOUS; SUBCUTANEOUS
Status: DISCONTINUED | OUTPATIENT
Start: 2019-03-11 | End: 2019-03-16 | Stop reason: HOSPADM

## 2019-03-11 RX ORDER — ASPIRIN 81 MG/1
81 TABLET, CHEWABLE ORAL DAILY
Status: DISCONTINUED | OUTPATIENT
Start: 2019-03-11 | End: 2019-03-16 | Stop reason: HOSPADM

## 2019-03-11 RX ORDER — LEVOFLOXACIN 5 MG/ML
500 INJECTION, SOLUTION INTRAVENOUS ONCE
Status: COMPLETED | OUTPATIENT
Start: 2019-03-11 | End: 2019-03-11

## 2019-03-11 RX ORDER — IPRATROPIUM BROMIDE AND ALBUTEROL SULFATE 2.5; .5 MG/3ML; MG/3ML
SOLUTION RESPIRATORY (INHALATION)
Status: COMPLETED
Start: 2019-03-11 | End: 2019-03-11

## 2019-03-11 RX ORDER — AMOXICILLIN 250 MG
2 CAPSULE ORAL 2 TIMES DAILY PRN
Status: DISCONTINUED | OUTPATIENT
Start: 2019-03-11 | End: 2019-03-16 | Stop reason: HOSPADM

## 2019-03-11 RX ORDER — BENZTROPINE MESYLATE 1 MG/1
1-2 TABLET ORAL 3 TIMES DAILY PRN
Status: DISCONTINUED | OUTPATIENT
Start: 2019-03-11 | End: 2019-03-16 | Stop reason: HOSPADM

## 2019-03-11 RX ORDER — ONDANSETRON 4 MG/1
4 TABLET, ORALLY DISINTEGRATING ORAL EVERY 6 HOURS PRN
Status: DISCONTINUED | OUTPATIENT
Start: 2019-03-11 | End: 2019-03-16 | Stop reason: HOSPADM

## 2019-03-11 RX ORDER — LEVOFLOXACIN 5 MG/ML
250 INJECTION, SOLUTION INTRAVENOUS EVERY 24 HOURS
Status: DISCONTINUED | OUTPATIENT
Start: 2019-03-12 | End: 2019-03-12

## 2019-03-11 RX ORDER — ACETAMINOPHEN 325 MG/1
650 TABLET ORAL EVERY 4 HOURS PRN
Status: DISCONTINUED | OUTPATIENT
Start: 2019-03-11 | End: 2019-03-16 | Stop reason: HOSPADM

## 2019-03-11 RX ORDER — ACETAMINOPHEN 325 MG/1
650 TABLET ORAL EVERY 4 HOURS PRN
Status: DISCONTINUED | OUTPATIENT
Start: 2019-03-11 | End: 2019-03-11

## 2019-03-11 RX ORDER — ERYTHROMYCIN 5 MG/G
OINTMENT OPHTHALMIC EVERY 6 HOURS
Status: DISCONTINUED | OUTPATIENT
Start: 2019-03-11 | End: 2019-03-11

## 2019-03-11 RX ORDER — ERYTHROMYCIN 5 MG/G
OINTMENT OPHTHALMIC 4 TIMES DAILY
Status: DISCONTINUED | OUTPATIENT
Start: 2019-03-11 | End: 2019-03-16 | Stop reason: HOSPADM

## 2019-03-11 RX ORDER — ATORVASTATIN CALCIUM 20 MG/1
20 TABLET, FILM COATED ORAL EVERY EVENING
Status: DISCONTINUED | OUTPATIENT
Start: 2019-03-11 | End: 2019-03-16 | Stop reason: HOSPADM

## 2019-03-11 RX ORDER — CEFTRIAXONE 2 G/1
2 INJECTION, POWDER, FOR SOLUTION INTRAMUSCULAR; INTRAVENOUS EVERY 24 HOURS
Status: DISCONTINUED | OUTPATIENT
Start: 2019-03-11 | End: 2019-03-11

## 2019-03-11 RX ORDER — FUROSEMIDE 10 MG/ML
INJECTION INTRAMUSCULAR; INTRAVENOUS
Status: DISCONTINUED
Start: 2019-03-11 | End: 2019-03-11 | Stop reason: HOSPADM

## 2019-03-11 RX ORDER — NALOXONE HYDROCHLORIDE 0.4 MG/ML
.1-.4 INJECTION, SOLUTION INTRAMUSCULAR; INTRAVENOUS; SUBCUTANEOUS
Status: DISCONTINUED | OUTPATIENT
Start: 2019-03-11 | End: 2019-03-11

## 2019-03-11 RX ORDER — BISACODYL 10 MG
10 SUPPOSITORY, RECTAL RECTAL DAILY PRN
Status: DISCONTINUED | OUTPATIENT
Start: 2019-03-11 | End: 2019-03-16 | Stop reason: HOSPADM

## 2019-03-11 RX ADMIN — SODIUM CHLORIDE: 9 INJECTION, SOLUTION INTRAVENOUS at 16:12

## 2019-03-11 RX ADMIN — METOPROLOL TARTRATE 5 MG: 5 INJECTION, SOLUTION INTRAVENOUS at 13:19

## 2019-03-11 RX ADMIN — Medication 2500 UNITS: at 18:17

## 2019-03-11 RX ADMIN — IPRATROPIUM BROMIDE AND ALBUTEROL SULFATE 3 ML: 2.5; .5 SOLUTION RESPIRATORY (INHALATION) at 12:55

## 2019-03-11 RX ADMIN — LEVOFLOXACIN 500 MG: 5 INJECTION, SOLUTION INTRAVENOUS at 17:15

## 2019-03-11 RX ADMIN — FUROSEMIDE 20 MG: 10 INJECTION, SOLUTION INTRAVENOUS at 18:06

## 2019-03-11 RX ADMIN — METOPROLOL TARTRATE 5 MG: 5 INJECTION, SOLUTION INTRAVENOUS at 13:12

## 2019-03-11 RX ADMIN — HEPARIN SODIUM 550 UNITS/HR: 10000 INJECTION, SOLUTION INTRAVENOUS at 18:17

## 2019-03-11 RX ADMIN — FUROSEMIDE 80 MG: 10 INJECTION, SOLUTION INTRAVENOUS at 19:10

## 2019-03-11 RX ADMIN — ERYTHROMYCIN: 5 OINTMENT OPHTHALMIC at 18:06

## 2019-03-11 RX ADMIN — LEVALBUTEROL HYDROCHLORIDE 1.25 MG: 1.25 SOLUTION, CONCENTRATE RESPIRATORY (INHALATION) at 20:01

## 2019-03-11 RX ADMIN — METOPROLOL TARTRATE 2.5 MG: 5 INJECTION, SOLUTION INTRAVENOUS at 22:28

## 2019-03-11 RX ADMIN — ERYTHROMYCIN: 5 OINTMENT OPHTHALMIC at 13:17

## 2019-03-11 RX ADMIN — HYDROCODONE BITARTRATE AND ACETAMINOPHEN 1 TABLET: 10; 325 TABLET ORAL at 17:11

## 2019-03-11 RX ADMIN — IPRATROPIUM BROMIDE 0.5 MG: 0.5 SOLUTION RESPIRATORY (INHALATION) at 20:01

## 2019-03-11 RX ADMIN — ASPIRIN 81 MG 81 MG: 81 TABLET ORAL at 18:06

## 2019-03-11 RX ADMIN — CEFTRIAXONE SODIUM 1 G: 1 INJECTION, POWDER, FOR SOLUTION INTRAMUSCULAR; INTRAVENOUS at 12:54

## 2019-03-11 RX ADMIN — CEFTRIAXONE SODIUM 1 G: 1 INJECTION, POWDER, FOR SOLUTION INTRAMUSCULAR; INTRAVENOUS at 12:16

## 2019-03-11 RX ADMIN — SODIUM CHLORIDE 500 ML: 9 INJECTION, SOLUTION INTRAVENOUS at 11:33

## 2019-03-11 RX ADMIN — SODIUM CHLORIDE 500 MG: 9 INJECTION, SOLUTION INTRAVENOUS at 13:11

## 2019-03-11 RX ADMIN — METOPROLOL TARTRATE 5 MG: 5 INJECTION, SOLUTION INTRAVENOUS at 12:56

## 2019-03-11 ASSESSMENT — MIFFLIN-ST. JEOR: SCORE: 742.01

## 2019-03-11 ASSESSMENT — ACTIVITIES OF DAILY LIVING (ADL): ADLS_ACUITY_SCORE: 21

## 2019-03-11 NOTE — PROGRESS NOTES
03/11/19 1709   General Information   Onset Date 03/11/19   Start of Care Date 03/11/19   Referring Physician Ariella XIE   Patient Profile Review/OT: Additional Occupational Profile Info See Profile for full history and prior level of function   Swallowing Evaluation Bedside swallow evaluation   Behaviorial Observations Alert;Confused;Distractible   Mode of current nutrition NPO   Respiratory Status O2 Supply   Type of O2 supply Nasal cannula  (SOB)   Comments Per PA note: Laya Lutz is a 89 year old female with PMHx of paroxysmal atrial fibrillation not on chronic anticoagulation, mild intermittent asthma, HTN, depression, neuropathy, severe osteoarthritis and scoliosis on chronic narcotics, cognitive impairment, hx of Takotsubo cardiomyopathy, and CKD III admitted on 3/11/2019 after being found down on the floor at her care home. History limited due to cognitive impairment.    Sepsis secondary to right upper lobe pneumonia.      Daughter notes pt eats softer foods, has trouble chewing meat.      SLP eval and Tx 12/2016-1/2017, DDL1 and nectar recommended, discharged on DDL1 and thin, no straw, precautions.      Did not attempt semi-solids due to loose dentures, pt distracted, SOB.   Clinical Swallow Evaluation   Dentition lower dentures;upper dentures  (lower dentures loose)   Oral Labial Strength and Mobility (asymmetrical)   Lingual Strength and Mobility impaired protrusion  (pull to right)   Laryngeal Function Cough;Throat clear;Swallow;Voicing initiated;Dry swallow palpated  (Weak cough initially)   Clinical Swallow Eval: Thin Liquid Texture Trial   Mode of Presentation, Thin Liquids cup   Volume of Liquid or Food Presented sips x 5   Oral Phase of Swallow Premature pharyngeal entry   Pharyngeal Phase of Swallow repeated swallows  (delay)   Diagnostic Statement increased SOB   Clinical Swallow Eval: Nectar Thick Liquid Texture Trial   Mode of Presentation, Nectar spoon;fed by clinician   Volume of  Nectar Presented tsps x 5   Oral Phase, Nectar Premature pharyngeal entry   Pharyngeal Phase, Nectar (delay)   Diagnostic Statement no increase in SOB, improved timing of swallow   Clinical Swallow Eval: Puree Solid Texture Trial   Mode of Presentation, Puree spoon;fed by clinician   Volume of Puree Presented tsps x 5   Oral Phase, Puree Premature pharyngeal entry   Pharyngeal Phase, Puree (delay)   Diagnostic Statement no increase in SOB, improved timing of swallow   Swallow Compensations   Swallow Compensations Pacing;Reduce amounts;Alternate viscosity of consistencies   Esophageal Phase of Swallow   Patient reports or presents with symptoms of esophageal dysphagia No   Swallow Eval: Clinical Impressions   Skilled Criteria for Therapy Intervention Skilled criteria met.  Treatment indicated.   Functional Assessment Scale (FAS) 3   Treatment Diagnosis moderate oral-pharyngeal dysphagia   Diet texture recommendations Dysphagia diet level 1;Nectar thick liquids   Recommended Feeding/Eating Techniques (see below)   Therapy Frequency 5 times/wk   Predicted Duration of Therapy Intervention (days/wks) 1 week   Anticipated Discharge Disposition (DEEPAK/to be determined if increased needs)   Risks and Benefits of Treatment have been explained. Yes   Patient, family and/or staff in agreement with Plan of Care Yes   Clinical Impression Comments A bedside swallow evaluation was completed this pm.  Patient presents with moderate oral-pharyngeal dysphagia/aspiration risk at bedside.  Deficits/risk factors include hx of dysphagia with temporary modification to nectar thick liquids and puree food during a previous admit, softer foods at baseline per family report, new RUL pneumonia, loose dentures, oral motor deficits, SOB, delayed swallows, need for double swallows, and increased SOB with thin liquid trials.  Unable to rule out silent aspiration at bedside.  Patient appeared to tolerate pudding and nectar thick liquids with mod-max  assist/cues.  Recommend a dysphagia diet level 1 and nectar thick liquids with 1:1 assist/supervision, sit up at 90 degrees, small bites/sips, liquids by spoon, slow rate, crush meds and give with puree, hold po if respiratory status declines.  Plan to continue swallow Tx with ongoing assessment of swallow safety.  Will attempt diet/liquid upgrades as indicated.   Total Evaluation Time   Total Evaluation Time (Minutes) 20

## 2019-03-11 NOTE — CONSULTS
[facility]  Cardiology Consultation     Date of Admission:  3/11/2019    Assessment & Plan   Laya Lutz is a 89 year old female who was admitted on 3/11/2019.      1.  Elevated troponin  The patient is chest pain-free.  Even after repeated asking she denies chest pain.  There is a low likelihood that the rise in troponin secondary to acute myocardial damage from a unstable plaque.  I think it is most likely that rise in troponin secondary demand ischemia from UTI or pneumonia.  However, I would start her on a heparin drip for the next 24 hours.  Given her overall status I am not very inclined to perform an ischemic workup unless troponin continue to rise.  However, I will discuss this further with her family members and with her when she is more lucid.   I will continue her on the current medications she is currently on including ASA.    2.  Acute on chronic systolic heart failure  On physical examination the patient appears to have bilateral basilar crackles suggestive of  pulmonary edema secondary to LV dysfunction.  There is further supported by a significantly elevated BNP.  I will diurese her with 20 mg of Lasix x1 now.  If she has good response I will continue her on 20 mg IV twice a day while in the hospital.  I would perform a transthoracic echocardiogram to assess her LV systolic function.    3.  Hypertension.   Currently well controlled.    Saleem Klein    Code Status    DNR/DNI    Reason for Consult   Reason for consult: Rule out ACS and heart failure    Primary Care Physician   Dayton Huston    Chief Complaint   Found down    History of Present Illness   Laya Lutz is a 89 year old female with a significant past medical history of stress-induced cardia myopathy diagnosed in 2012, left ventricle systolic dysfunction with ejection fraction of 25%, hypertension, CKD stage III, chronic pain on narcotics, cognitive impairment  who was brought to the hospital after  being found down at her correction.  The patient was not to use it during the encounter and was unable to provide meaningful history.  Most of the history was obtained by chart review.    Presentation to the hospital the patient underwent an EKG which showed normal sinus rhythm, no significant ST-T wave changes.  Troponin was 3.1 and then increased to 4.9.  BNP was over >39,000 and increased to >54,000.  As an outpatient the patient was only taking amlodipine and lisinopril and metoprolol as a cardiac medications.  She was not on a diuretic.  The patient has similar presentation 2012 when her BNP was found to be elevated along with elevation of troponin to current levels.  An echocardiogram at that time showed findings consistent with stress-induced cardiomyopathy which was thought to be secondary to UTI.  Left ventricular ejection fraction was 30-40%.  Ischemic workup was not performed due to cognitive impairment and less likelihood of coronary disease given the findings on echocardiography and no changes on EKG.      Patient Active Problem List   Diagnosis     Diarrhea     Tachycardia     Essential hypertension     Depression     Left leg pain     Radicular leg pain     Confusional state     UTI (urinary tract infection)     Physical deconditioning     Stress-induced cardiomyopathy     Asthma     Hand pain, left     Spinal stenosis of lumbar region     Aftercare following surgery     Lumbar disc disease with radiculopathy     Hypertension     Hypertensive urgency     Encephalopathy acute     Generalized weakness     Aspiration of food, initial encounter     Asthma exacerbation     ACP (advance care planning)     Sepsis (H)     ACS (acute coronary syndrome) (H)     Pneumonia       Past Medical History   I have reviewed this patient's medical history and updated it with pertinent information if needed.   Past Medical History:   Diagnosis Date     Asthma      Atrial fibrillation (H)      Depression      Unspecified  essential hypertension        Past Surgical History   I have reviewed this patient's surgical history and updated it with pertinent information if needed.  Past Surgical History:   Procedure Laterality Date     APPENDECTOMY       CATARACT IOL, RT/LT       CHOLECYSTECTOMY       DISCECTOMY LUMBAR POSTERIOR MICROSCOPIC ONE LEVEL  9/17/2012    Procedure: DISCECTOMY LUMBAR POSTERIOR MICROSCOPIC ONE LEVEL;  MICRODISCECTOMY LEFT L3-4;  Surgeon: Abrahan Cabral MD;  Location: [facility] OR     HYSTERECTOMY         Prior to Admission Medications   Prior to Admission Medications   Prescriptions Last Dose Informant Patient Reported? Taking?   AMLODIPINE BESYLATE PO  Daughter Yes Yes   Sig: Take 5 mg by mouth daily   GABAPENTIN PO  Daughter Yes Yes   Sig: Take 900 mg by mouth At Bedtime (takes 3 x 300 mg caps)   HYDROcodone-acetaminophen (NORCO)  MG per tablet prn med Daughter Yes Yes   Sig: Take 1 tablet by mouth 3 times daily as needed for severe pain   LISINOPRIL PO  Daughter Yes Yes   Sig: Take 40 mg by mouth daily   Multiple Vitamins-Minerals (CENTRUM SILVER) per tablet  Daughter Yes Yes   Sig: Take 1 tablet by mouth daily.   acetaminophen (TYLENOL) 325 MG tablet prn med Daughter No Yes   Sig: Take 2 tablets (650 mg) by mouth every 4 hours as needed for mild pain   albuterol (PROAIR HFA) 108 (90 BASE) MCG/ACT inhaler prn med Daughter No Yes   Sig: INHALE 2 PUFFS BY MOUTH EVERY 4 TO 6 HOURS AS NEEDED FOR WHEEZE   citalopram (CELEXA) 40 MG tablet  Daughter No Yes   Sig: TAKE ONE TABLET BY MOUTH DAILY   metoprolol (TOPROL-XL) 25 MG 24 hr tablet  Daughter No Yes   Sig: Take 1 tablet by mouth daily.   omeprazole (PRILOSEC) 20 MG capsule  Daughter No Yes   Sig: TAKE ONE CAPSULE BY MOUTH DAILY 30 MINUTES BEFORE BREAKFAST   order for [facility]  Pharmacy No No   Sig: Equipment being ordered: rib belt      Facility-Administered Medications: None     Current Facility-Administered Medications   Medication Dose Route Frequency     furosemide          [START ON 3/12/2019] amLODIPine  5 mg Oral Daily     aspirin  81 mg Oral Daily     atorvastatin  20 mg Oral QPM     [START ON 3/12/2019] citalopram  40 mg Oral Daily     erythromycin   Left Eye 4x Daily     furosemide  80 mg Intravenous Once     ipratropium  0.5 mg Nebulization Q6H     levalbuterol  1.25 mg Nebulization Q6H     [START ON 3/12/2019] levofloxacin  250 mg Intravenous Q24H     [START ON 3/12/2019] metoprolol succinate ER  25 mg Oral Daily     [START ON 3/12/2019] omeprazole  20 mg Oral QAM AC     sodium chloride (PF)  3 mL Intracatheter Q8H     sodium chloride (PF)  3 mL Intracatheter Q8H     Current Facility-Administered Medications   Medication Last Rate     - MEDICATION INSTRUCTIONS -       - MEDICATION INSTRUCTIONS -       HEParin 550 Units/hr (03/11/19 1817)     - MEDICATION INSTRUCTIONS -       - MEDICATION INSTRUCTIONS -       - MEDICATION INSTRUCTIONS -       - MEDICATION INSTRUCTIONS -       ACE/ARB/ARNI NOT PRESCRIBED       Allergies   Allergies   Allergen Reactions     Fish Allergy      Throat swelled, can tolerate shrimp,crab     Novocain [Procaine Hcl] Anaphylaxis     Face swelled difficulty breathing     Shellfish-Derived Products Shortness Of Breath     Fosamax [Alendronate Sodium]      Penicillins      Unsure of reaction       Social History    reports that  has never smoked. she has never used smokeless tobacco. She reports that she drinks alcohol. She reports that she does not use drugs.    Family History   No family history on file.    Review of Systems   The comprehensive 10 point Review of Systems is negative other than noted in the HPI or here.     Physical Exam   Temp: 97.5  F (36.4  C) Temp src: Axillary BP: 123/83 Pulse: 83 Heart Rate: 96 Resp: 28 SpO2: 100 % O2 Device: Oxymask Oxygen Delivery: 15 LPM  Vital Signs with Ranges  Temp:  [97.5  F (36.4  C)-98.2  F (36.8  C)] 97.5  F (36.4  C)  Pulse:  [] 83  Heart Rate:  [] 96  Resp:  [17-37] 28  BP:  (109-167)/() 123/83  SpO2:  [90 %-100 %] 100 %  101 lbs 0 oz    Constitutional: Frail appearing lady, hard of hearing, reports pain in left ankle  Respiratory: Poor air entry bilaterally, basal crackles bilaterally after intrascapular area.,  Use of a sensory muscles.  Cardiovascular: Soft S1-S2, no murmurs, regular rate and rhythm   GI: Soft nondistended nontender   Genitourinary: Deferred  Skin: No ulcers or bruises  Musculoskeletal: Moves all 4 extremities  Neuropsychiatric: Alert and oriented x1    Data   I personally reviewed EKG which showed sinus rhythm, low-voltage complexes, no ST-T wave changes    Results for orders placed or performed during the hospital encounter of 03/11/19 (from the past 24 hour(s))   CBC with platelets differential   Result Value Ref Range    WBC 15.2 (H) 4.0 - 11.0 10e9/L    RBC Count 4.46 3.8 - 5.2 10e12/L    Hemoglobin 13.6 11.7 - 15.7 g/dL    Hematocrit 39.9 35.0 - 47.0 %    MCV 90 78 - 100 fl    MCH 30.5 26.5 - 33.0 pg    MCHC 34.1 31.5 - 36.5 g/dL    RDW 13.6 10.0 - 15.0 %    Platelet Count 247 150 - 450 10e9/L    Diff Method Automated Method     % Neutrophils 87.1 %    % Lymphocytes 6.9 %    % Monocytes 5.4 %    % Eosinophils 0.1 %    % Basophils 0.1 %    % Immature Granulocytes 0.4 %    Nucleated RBCs 0 0 /100    Absolute Neutrophil 13.2 (H) 1.6 - 8.3 10e9/L    Absolute Lymphocytes 1.0 0.8 - 5.3 10e9/L    Absolute Monocytes 0.8 0.0 - 1.3 10e9/L    Absolute Eosinophils 0.0 0.0 - 0.7 10e9/L    Absolute Basophils 0.0 0.0 - 0.2 10e9/L    Abs Immature Granulocytes 0.1 0 - 0.4 10e9/L    Absolute Nucleated RBC 0.0    Comprehensive metabolic panel   Result Value Ref Range    Sodium 125 (L) 133 - 144 mmol/L    Potassium 5.2 3.4 - 5.3 mmol/L    Chloride 90 (L) 94 - 109 mmol/L    Carbon Dioxide 23 20 - 32 mmol/L    Anion Gap 12 3 - 14 mmol/L    Glucose 122 (H) 70 - 99 mg/dL    Urea Nitrogen 28 7 - 30 mg/dL    Creatinine 1.46 (H) 0.52 - 1.04 mg/dL    GFR Estimate 31 (L) >60  mL/min/[1.73_m2]    GFR Estimate If Black 36 (L) >60 mL/min/[1.73_m2]    Calcium 8.5 8.5 - 10.1 mg/dL    Bilirubin Total 0.9 0.2 - 1.3 mg/dL    Albumin 3.0 (L) 3.4 - 5.0 g/dL    Protein Total 7.0 6.8 - 8.8 g/dL    Alkaline Phosphatase 117 40 - 150 U/L    ALT 29 0 - 50 U/L    AST 78 (H) 0 - 45 U/L   INR   Result Value Ref Range    INR 1.08 0.86 - 1.14   Troponin I   Result Value Ref Range    Troponin I ES 3.131 (HH) 0.000 - 0.045 ug/L   Nt probnp inpatient   Result Value Ref Range    N-Terminal Pro BNP Inpatient 39,103 (H) 0 - 1,800 pg/mL   CK total   Result Value Ref Range    CK Total 233 (H) 30 - 225 U/L   Magnesium   Result Value Ref Range    Magnesium 2.0 1.6 - 2.3 mg/dL   TSH with free T4 reflex   Result Value Ref Range    TSH 10.85 (H) 0.40 - 4.00 mU/L   T4 free   Result Value Ref Range    T4 Free 1.01 0.76 - 1.46 ng/dL   Phosphorus   Result Value Ref Range    Phosphorus 4.4 2.5 - 4.5 mg/dL   UA with Microscopic   Result Value Ref Range    Color Urine Yellow     Appearance Urine Slightly Cloudy     Glucose Urine Negative NEG^Negative mg/dL    Bilirubin Urine Negative NEG^Negative    Ketones Urine 5 (A) NEG^Negative mg/dL    Specific Gravity Urine 1.013 1.003 - 1.035    Blood Urine Trace (A) NEG^Negative    pH Urine 6.0 5.0 - 7.0 pH    Protein Albumin Urine 30 (A) NEG^Negative mg/dL    Urobilinogen mg/dL Normal 0.0 - 2.0 mg/dL    Nitrite Urine Positive (A) NEG^Negative    Leukocyte Esterase Urine Small (A) NEG^Negative    Source Catheterized Urine     WBC Urine 21 (H) 0 - 5 /HPF    RBC Urine 1 0 - 2 /HPF    WBC Clumps Present (A) NEG^Negative /HPF    Bacteria Urine Few (A) NEG^Negative /HPF    Mucous Urine Present (A) NEG^Negative /LPF    Hyaline Casts 3 (H) 0 - 2 /LPF   Blood culture   Result Value Ref Range    Specimen Description Blood Right Arm     Special Requests Aerobic and anaerobic bottles received     Culture Micro No growth after 2 hours    Blood culture   Result Value Ref Range    Specimen  Description Blood Left Hand     Special Requests Aerobic and anaerobic bottles received     Culture Micro No growth after 2 hours    XR Chest 1 View    Narrative    CHEST ONE VIEW  3/11/2019 12:14 PM     HISTORY: cough, weakness    COMPARISON: 5/22/2017    FINDINGS : Increased opacity at the lateral right upper lobe is  consistent with infiltrate/pneumonia. Subtle curvilinear opacity in  the lateral lower right lung is similar but slightly more prominent  than on the previous exam. Minimal focal left upper lobe opacity. Left  lung otherwise clear. Heart and pulmonary vessels within normal  limits. Tortuous thoracic aorta with calcification. Degenerative  changes left shoulder. Scoliosis.      Impression    IMPRESSION: Consistent with right-sided pneumonia. Follow-up to ensure  complete resolution.    PO MORRISON MD   CT Head w/o Contrast    Narrative    CT SCAN OF THE HEAD WITHOUT CONTRAST   3/11/2019 12:19 PM     HISTORY: Trauma, decreased mental status.    TECHNIQUE:  Axial images of the head and coronal reformations without  IV contrast material. Radiation dose for this scan was reduced using  automated exposure control, adjustment of the mA and/or kV according  to patient size, or iterative reconstruction technique.    COMPARISON: 11/21/2017    FINDINGS:  There is generalized atrophy of the brain.  There is low  attenuation in the white matter of the cerebral hemispheres consistent  with sequelae of small vessel ischemic disease. Old infarct is seen in  the right superior cerebellar hemisphere. Old lacunar infarct is seen  in head of the right caudate nucleus. There is no evidence of  intracranial hemorrhage, mass, acute infarct or anomaly.     The visualized portions of the sinuses and mastoids appear normal.  There is no evidence of trauma.      Impression    IMPRESSION:   1. No acute abnormality.  2. Atrophy of the brain.  White matter changes consistent with  sequelae of small vessel ischemic  disease.  3. 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  4. No change.     ROMIE DAMIAN MD   CT Cervical Spine w/o Contrast    Narrative    CT CERVICAL SPINE WITHOUT CONTRAST   3/11/2019 12:19 PM     HISTORY: Trauma, decreased mental status.     TECHNIQUE: Axial images of the cervical spine were obtained without  intravenous contrast. Multiplanar reformations were performed.   Radiation dose for this scan was reduced using automated exposure  control, adjustment of the mA and/or kV according to patient size, or  iterative reconstruction technique.    COMPARISON: 12/12/2014    FINDINGS: There is no evidence of fracture, but motion artifacts limit  detail for small fractures. There is multilevel degenerative disc  disease and degenerative facet arthropathy. No evidence of hematoma.  Paraspinous soft tissues appear normal. Scoliosis convex to the left.    Alveolar infiltrates with intermixed groundglass densities are noted  in lateral aspect of the right upper lobe suggestive of pneumonia.      Impression    IMPRESSION:  1. No evidence of acute trauma, but motion artifacts limit detail.  2. Multilevel degenerative disc disease and degenerative facet  arthropathy, unchanged.  3. Right upper lobe pulmonary infiltrate laterally consistent with  pneumonia.    ROMIE DAMIAN MD   Influenza A/B antigen   Result Value Ref Range    Influenza A/B Agn Specimen Nasopharyngeal     Influenza A Negative NEG^Negative    Influenza B Negative NEG^Negative   EKG 12-lead, tracing only   Result Value Ref Range    Interpretation ECG Click View Image link to view waveform and result    Troponin I   Result Value Ref Range    Troponin I ES 4.957 (HH) 0.000 - 0.045 ug/L   NT proBNP inpatient and ED   Result Value Ref Range    N-Terminal Pro BNP Inpatient 54,086 (H) 0 - 1,800 pg/mL   Basic metabolic panel   Result Value Ref Range    Sodium 128 (L) 133 - 144 mmol/L    Potassium 4.9 3.4 - 5.3 mmol/L    Chloride 98 94 - 109 mmol/L    Carbon Dioxide 20 20 - 32  mmol/L    Anion Gap 10 3 - 14 mmol/L    Glucose 106 (H) 70 - 99 mg/dL    Urea Nitrogen 27 7 - 30 mg/dL    Creatinine 1.33 (H) 0.52 - 1.04 mg/dL    GFR Estimate 34 (L) >60 mL/min/[1.73_m2]    GFR Estimate If Black 40 (L) >60 mL/min/[1.73_m2]    Calcium 8.2 (L) 8.5 - 10.1 mg/dL   Osmolality   Result Value Ref Range    Osmolality 273 (L) 280 - 301 mmol/kg   Sodium   Result Value Ref Range    Sodium 127 (L) 133 - 144 mmol/L   Lactic acid whole blood   Result Value Ref Range    Lactic Acid 1.4 0.7 - 2.0 mmol/L     Most Recent 3 CBC's:  Recent Labs   Lab Test 03/11/19  1100 11/21/17  1500 05/23/17  0755   WBC 15.2* 7.7 7.0   HGB 13.6 11.3* 11.8   MCV 90 91 94    307 250

## 2019-03-11 NOTE — ED NOTES
Bed: ED26  Expected date:   Expected time:   Means of arrival:   Comments:  [facility] - 592 - 89F fall leg pain eta 1050

## 2019-03-11 NOTE — ED NOTES
"[facility]  ED Nurse Handoff Report    ED Chief complaint: Fall (FOUND BY STAFF ON FLOOR OF br AT ASISTED LIVING, bg 92, hr 132, FATIGUE, DEC loc, UNKNOWN WHY OR HOW SHE FELL, FAMILY DOES MEDS, 20G iv LEFT HAND, c COLLAR ON, STAFF MOVED PT TO SOFA FROM FLOOR )      ED Diagnosis:   Final diagnoses:   None       Code Status: has POLST     Allergies:   Allergies   Allergen Reactions     Fish Allergy      Throat swelled, can tolerate shrimp,crab     Novocain [Procaine Hcl] Anaphylaxis     Face swelled difficulty breathing     Shellfish-Derived Products Shortness Of Breath     Fosamax [Alendronate Sodium]      Penicillins      Unsure of reaction       Activity level - Baseline/Home:  Unable to Assess    Activity Level - Current:   Unable to Assess     Needed?: No    Isolation: No  Infection: Not Applicable  Bariatric?: No    Vital Signs:   Vitals:    03/11/19 1058 03/11/19 1112   BP: (!) 167/122    Resp: 20    Temp: 97.7  F (36.5  C) 98.2  F (36.8  C)   TempSrc: Oral Rectal   SpO2: 91%        Cardiac Rhythm: ,        Pain level:      Is this patient confused?:hard to say, very sleepy Does this patient have a guardian?  No         If yes, is there guardianship documents in the Epic \"Code/ACP\" activity?  No         Guardian Notified?  No  [place] - Suicide Severity Rating Scale Completed?  Yes   If yes, what color did the patient score?  White    Patient Report: Initial Complaint: found on BR floor by assisted living staff , they moved her to the couch, ems placed c collar, very sleepy, unknown why she fell , difficult to assess if anything hurts ,  Focused Assessment: eyes crysted shut, falls asleep when asked questions, seems oriented when she answers, states yes to CP, but yes to multiple pain sites, unknown LOC no obvious injuriew   Tests Performed: labs,cy ecg, cxr   Abnormal Results: trop critical high, NA low multiple labs abnormal   Treatments provided: awaiting orders     Family " Comments: none here     OBS brochure/video discussed/provided to patient/family: No              Name of person given brochure if not patient: na              Relationship to patient: na    ED Medications:   Medications   0.9% sodium chloride BOLUS (500 mLs Intravenous New Bag 3/11/19 1133)       Drips infusing?:  No    For the majority of the shift this patient was Green.   Interventions performed were na.    Severe Sepsis OR Septic Shock Diagnosis Present: No    To be done/followed up on inpatient unit:  none at this time     ED NURSE PHONE NUMBER: [phone]

## 2019-03-11 NOTE — H&P
[facility]    History and Physical - Hospitalist Service       Date of Admission:  3/11/2019    Assessment & Plan   Laya Lutz is a 89 year old female with PMHx of paroxysmal atrial fibrillation not on chronic anticoagulation, mild intermittent asthma, HTN, depression, neuropathy, severe osteoarthritis and scoliosis on chronic narcotics, cognitive impairment, hx of Takotsubo cardiomyopathy, and CKD III admitted on 3/11/2019 after being found down on the floor at her intermediate. History limited due to cognitive impairment; collateral information from pt's daughter, Etta, and extensive chart review. Vitals currently WNL. Pt currently stable.      Sepsis secondary to right upper lobe pneumonia  Acute hypoxic respiratory failure secondary to above  Mild intermittent asthma, not in acute exacerbation:  Not hypoxic on admission, but now requiring 3 LPM supplemental oxygen via NC. WBC 15.2. CXR with right upper lobe pneumonia. Given dose of azithromycin and ceftriaxone in the ED. Rapid influenza negative.   -- Admit under inpatient status   -- IV Levaquin  -- Xoponex inhaler PRN   -- RCAT consulted, IS and acapella ordered    -- Urine legionella   -- Ween oxygen as tolerated   -- SLP to eval for dysphagia; daughter notes pt eats softer foods, has trouble chewing meat   -- Follow blood cultures, CMP, CBC     UTI: UA positive for nitrites, WBC 21.   -- IV Levaquin as above   -- Follow urine cultures   -- Bladder scan to ensure no retention    SVT, resolved  NSTEMI, likely type II in the setting of above  Hx of Takotsubo cardiomyopathy (2012): EKG on admission with SVT and HR in the 130s. Pt given Metoprolol IV 5 mg X3 with resolve of rates. Hx of Takotsubo cardiomyopathy in 8/2012 in the context of UTI. At that time, EF 35-40%, large area of apical akinesis extending to involve the distal portions of the myocardial segments, mild pulmonary HTN noted--during that hospitalization, trop peaked at 3.750; no prior  stress testing or angiogram. No chest pain, dizziness, lightheadedness. BNP 69388. Trop 3.131. Does not appear overtly fluid overloaded on exam.   -- Repeat EKG with NSR, no evidence of ischemia   -- STAT troponin, serial troponins   -- Echocardiogram   -- Cardiology consulted, appreciate assistance   -- Telemetry   -- Continue PTA BB, resume ACE-I in the AM pending renal function   -- Monitor for respiratory decline, notify hospitalist immediately if any change in respiratory status   -- I&Os, daily weights     Unwitnessed fall: CT head and cervical spine negative for acute pathology. Denies focal pain elsewhere, no focal neuro deficits. .   -- PT, OT, SW consult  -- Neuro checks q4; discontinue after 24 hours     Hypochloremic hyponatremia: Sodium 125, chloride 90. UA with hyaline casts. Pt received 500 ml bolus in the ED   -- IVF with 0.9% NS at 50 ccs/hr   -- Repeat BMP now to re-eval sodium and again at 2000; do not want to overcorrect sodium >6 mmol/L in the first 24 hours; notify hospitalist if repeat sodium close to 131  -- Urine osm, serum osm, urine sodium     Left eye purulence: Noted dried purulence in left eye, sclera not injected. PERRLA. Hx limited at this time. Received erythromycin ophthalmic in the ED.   -- Cipro gtt BID, clean area with warm water   -- Reeval symptoms in the AM     Paroxysmal atrial fibrillation: CHADS2-VASc 4. Not on chronic anticoagulation; takes Aspirin 81 mg po every day.   -- Continue Toprol XL 25 mg po every day  -- Telemetry     HTN: Continue PTA Norvasc 5 mg po qd and Toprol XL 25 mg po every day. Hold PTA Lisinopril 40 mg po every day.     Depression: Continue Celexa 40 mg po every day     Severe osteoarthritis  Scoliosis   Neuropathy: Continue PTA PRN Tylenol, PRN Norco. Hold gabapentin 900 mg po qhs at this time.     Cognitive impairment: Oriented to place and person, thinks it is 2018 on my interview. Noted mild cognitive impairment per daughter. Receives  assistance with ADLs at [facility] and family manages medications.   -- OT consult  -- Delirium prevention protocol ordered     CKD III: Most recent creatinine from 8/2018 was 1.33.   -- Hold Lisinopril above   -- Avoid nephrotoxic agents   -- BMP in the AM     Abnormal thyroid studies: TSH 10.85, T4 1.01.   -- Repeat thyroid studies in 4-6 weeks.      Diet: NPO for Medical/Clinical Reasons Except for: Meds until bedside swallow performed by RN, then advance to 2g sodium diet   DVT Prophylaxis: Pneumatic Compression Devices  Ma Catheter: not present  Code Status: DNR/DNI      Disposition Plan   Expected discharge: 2 - 3 days, recommended to transitional care unit once antibiotic plan established, mental status at baseline, safe disposition plan/ TCU bed available and SIRS/Sepsis treated.  Entered: Ariella Perez PA-C 03/11/2019, 3:07 PM     The patient's care was discussed with the Attending Physician, Dr. Zuñiga.    Ariella Perez PA-C  [facility]  ______________________________________________________________________  Chief Complaint   Fall     History is obtained from the patient    History of Present Illness   Laya Lutz is a 89 year old female with PMHx of paroxysmal atrial fibrillation not on chronic anticoagulation, mild intermittent asthma, HTN, depression, neuropathy, severe osteoarthritis and scoliosis on chronic narcotics, cognitive impairment, hx of Takotsubo cardiomyopathy, and CKD III admitted on 3/11/2019 after being found down on the floor at her [facility]. History limited due to cognitive impairment; collateral information from pt's daughter, Etta, and extensive chart review. Vitals currently WNL. Pt currently stable.      Presenting after being found on the ground at [facility] by nursing staff. Unclear how long she was down for. Pt altered on admission (baseline cognitive impairment).     Seen and assessed by Dr. Springer who obtained basic labs and  imaging which revealed sodium 125, chloride 90, creatinine 1.46, AST 78. , BNP 02066. TSH 10.85, T4 1.01. WBC 15.2. UA positive for nitrites, WBC 21. Blood and urine cultures pending. Rapid flu negative. CXR with RUL pneumonia. CT head and CT cervical spine unremarkable. Received 500 ml bolus in the ED, azithromycin, ceftriaxone, erythromycin ophthalmic for left eye, duonebs, lopressor 5 mg X3.     In the ED, pt seemed more alert--oriented to person and place, thinking year was 2018 on admission. Notes diffuse pain (severe osteoarthritis), no focal pain or neuro deficits. Pt does not recall the fall. Does not complain of any current chest pain, SOB, dizziness, lightheadedness, nausea, vomiting. Appears dry. No recent cough. Discussed case with daughter, Etta, who's  saw patient yesterday and did not comment on her seeming more confused or different than normal. Call out to [facility] staff, but have not heard back from them.     Review of Systems    The 10 point Review of Systems is negative other than noted in the HPI .    Past Medical History    1. Paroxysmal atrial fibrillation; [id number] of 4, only takes ASA 81 mg po every day   2. Mild intermittent asthma   2. HTN   3. Depression   4. Neuropathy   5. Severe arthritis, DJD, scoliosis on chronic narcotics   6. Cognitive impairment   7. Hx of Takotsubo cardiomyopathy 8/2012 with echo showing EF 35-40%  8. CKD III, most recent creatinine 1.33  9. Question of dysphagia     Past Surgical History   I have reviewed this patient's surgical history and updated it with pertinent information if needed.  Past Surgical History:   Procedure Laterality Date     APPENDECTOMY       CATARACT IOL, RT/LT       CHOLECYSTECTOMY       DISCECTOMY LUMBAR POSTERIOR MICROSCOPIC ONE LEVEL  9/17/2012    Procedure: DISCECTOMY LUMBAR POSTERIOR MICROSCOPIC ONE LEVEL;  MICRODISCECTOMY LEFT L3-4;  Surgeon: Abrahan Cabral MD;  Location: [facility] OR     HYSTERECTOMY       Social History   I  have reviewed this patient's social history and updated it with pertinent information if needed.  Social History     Tobacco Use     Smoking status: Never Smoker     Smokeless tobacco: Never Used   Substance Use Topics     Alcohol use: Yes     Comment: occasionally     Drug use: No     Family History   Reviewed and non-contributory to current chief complaint.     Prior to Admission Medications   Prior to Admission Medications   Prescriptions Last Dose Informant Patient Reported? Taking?   AMLODIPINE BESYLATE PO  Daughter Yes Yes   Sig: Take 5 mg by mouth daily   GABAPENTIN PO  Daughter Yes Yes   Sig: Take 900 mg by mouth At Bedtime (takes 3 x 300 mg caps)   HYDROcodone-acetaminophen (NORCO)  MG per tablet prn med Daughter Yes Yes   Sig: Take 1 tablet by mouth 3 times daily as needed for severe pain   LISINOPRIL PO  Daughter Yes Yes   Sig: Take 40 mg by mouth daily   Multiple Vitamins-Minerals (CENTRUM SILVER) per tablet  Daughter Yes Yes   Sig: Take 1 tablet by mouth daily.   acetaminophen (TYLENOL) 325 MG tablet prn med Daughter No Yes   Sig: Take 2 tablets (650 mg) by mouth every 4 hours as needed for mild pain   albuterol (PROAIR HFA) 108 (90 BASE) MCG/ACT inhaler prn med Daughter No Yes   Sig: INHALE 2 PUFFS BY MOUTH EVERY 4 TO 6 HOURS AS NEEDED FOR WHEEZE   citalopram (CELEXA) 40 MG tablet  Daughter No Yes   Sig: TAKE ONE TABLET BY MOUTH DAILY   metoprolol (TOPROL-XL) 25 MG 24 hr tablet  Daughter No Yes   Sig: Take 1 tablet by mouth daily.   omeprazole (PRILOSEC) 20 MG capsule  Daughter No Yes   Sig: TAKE ONE CAPSULE BY MOUTH DAILY 30 MINUTES BEFORE BREAKFAST   order for [facility] No No   Sig: Equipment being ordered: rib belt      Facility-Administered Medications: None     Allergies   Allergies   Allergen Reactions     Fish Allergy      Throat swelled, can tolerate shrimp,crab     Novocain [Procaine Hcl] Anaphylaxis     Face swelled difficulty breathing     Shellfish-Derived Products Shortness Of  Breath     Fosamax [Alendronate Sodium]      Penicillins      Unsure of reaction     Physical Exam   Vital Signs: Temp: 98.2  F (36.8  C) Temp src: Rectal BP: 118/79 Pulse: 83 Heart Rate: 84 Resp: 27 SpO2: 97 % O2 Device: Nasal cannula Oxygen Delivery: 3 LPM  Weight: 0 lbs 0 oz    CONSTITUTIONAL: Pt laying in bed, dressed in hospital garb. Appears ill, pursed lip breathing. Cooperative with interview.   HEENT: Normocephalic, atraumatic. Left eye with purulent drainage. Negative for conjunctival redness or scleral icterus.  Oral mucosa dry.   CARDIOVASCULAR: RRR, no murmurs, rubs, or extra heart sounds appreciated. Pulses +2/4 and regular in upper and lower extremities, bilaterally.   RESPIRATORY: No increased work of breathing.  Mild crackles in bilateral lower extremities.   GASTROINTESTINAL:  Abdomen soft, non-distended. BS auscultated in all four quadrants. Negative for tenderness to palpation.  No masses or organomegaly noted.  MUSCULOSKELETAL: No gross deformities noted. Normal muscle tone.   HEMATOLOGIC/LYMPHATIC/IMMUNOLOGIC: 2+ pitting edema in lower extremities, bilat.   NEUROLOGIC: Alert and oriented to person and place, thinks year is 2018. No focal neuro deficits.   SKIN: Chronic venous stasis discoloration in bilateral lower extremities.     Data   Data reviewed today: I reviewed all medications, new labs and imaging results over the last 24 hours. I personally reviewed no images or EKG's today.    Recent Labs   Lab 03/11/19  1100   WBC 15.2*   HGB 13.6   MCV 90      INR 1.08   *   POTASSIUM 5.2   CHLORIDE 90*   CO2 23   BUN 28   CR 1.46*   ANIONGAP 12   NANCY 8.5   *   ALBUMIN 3.0*   PROTTOTAL 7.0   BILITOTAL 0.9   ALKPHOS 117   ALT 29   AST 78*   TROPI 3.131*     Recent Results (from the past 24 hour(s))   XR Chest 1 View    Narrative    CHEST ONE VIEW  3/11/2019 12:14 PM     HISTORY: cough, weakness    COMPARISON: 5/22/2017    FINDINGS : Increased opacity at the lateral right upper  lobe is  consistent with infiltrate/pneumonia. Subtle curvilinear opacity in  the lateral lower right lung is similar but slightly more prominent  than on the previous exam. Minimal focal left upper lobe opacity. Left  lung otherwise clear. Heart and pulmonary vessels within normal  limits. Tortuous thoracic aorta with calcification. Degenerative  changes left shoulder. Scoliosis.      Impression    IMPRESSION: Consistent with right-sided pneumonia. Follow-up to ensure  complete resolution.    PO MORRISON MD   CT Head w/o Contrast    Narrative    CT SCAN OF THE HEAD WITHOUT CONTRAST   3/11/2019 12:19 PM     HISTORY: Trauma, decreased mental status.    TECHNIQUE:  Axial images of the head and coronal reformations without  IV contrast material. Radiation dose for this scan was reduced using  automated exposure control, adjustment of the mA and/or kV according  to patient size, or iterative reconstruction technique.    COMPARISON: 11/21/2017    FINDINGS:  There is generalized atrophy of the brain.  There is low  attenuation in the white matter of the cerebral hemispheres consistent  with sequelae of small vessel ischemic disease. Old infarct is seen in  the right superior cerebellar hemisphere. Old lacunar infarct is seen  in head of the right caudate nucleus. There is no evidence of  intracranial hemorrhage, mass, acute infarct or anomaly.     The visualized portions of the sinuses and mastoids appear normal.  There is no evidence of trauma.      Impression    IMPRESSION:   1. No acute abnormality.  2. Atrophy of the brain.  White matter changes consistent with  sequelae of small vessel ischemic disease.  3. 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  4. No change.     ROMIE DAMIAN MD   CT Cervical Spine w/o Contrast    Narrative    CT CERVICAL SPINE WITHOUT CONTRAST   3/11/2019 12:19 PM     HISTORY: Trauma, decreased mental status.     TECHNIQUE: Axial images of the cervical spine were obtained without  intravenous contrast.  Multiplanar reformations were performed.   Radiation dose for this scan was reduced using automated exposure  control, adjustment of the mA and/or kV according to patient size, or  iterative reconstruction technique.    COMPARISON: 12/12/2014    FINDINGS: There is no evidence of fracture, but motion artifacts limit  detail for small fractures. There is multilevel degenerative disc  disease and degenerative facet arthropathy. No evidence of hematoma.  Paraspinous soft tissues appear normal. Scoliosis convex to the left.    Alveolar infiltrates with intermixed groundglass densities are noted  in lateral aspect of the right upper lobe suggestive of pneumonia.      Impression    IMPRESSION:  1. No evidence of acute trauma, but motion artifacts limit detail.  2. Multilevel degenerative disc disease and degenerative facet  arthropathy, unchanged.  3. Right upper lobe pulmonary infiltrate laterally consistent with  pneumonia.    ROMIE DAMIAN MD

## 2019-03-11 NOTE — PROGRESS NOTES
Pt arrived to unit at 1500. RN settled patient, vitals obtained, and spoke to MD at bedside. Report handed off to next shift RN.

## 2019-03-11 NOTE — PHARMACY-ADMISSION MEDICATION HISTORY
Admission medication history interview status for the 3/11/2019  admission is complete. See EPIC admission navigator for prior to admission medications     Medication history source reliability:Good    Actions taken by pharmacist (provider contacted, etc): spoke to daughter Etta (manages meds) [phone]     Additional medication history information not noted on PTA med list : removed alendronate, budesonide, sertraline, and montelukast from list per daughter    Medication reconciliation/reorder completed by provider prior to medication history? No    Time spent in this activity: 25 minutes    Prior to Admission medications    Medication Sig Last Dose Taking? Auth Provider   acetaminophen (TYLENOL) 325 MG tablet Take 2 tablets (650 mg) by mouth every 4 hours as needed for mild pain prn med Yes Tamiko Zamudio MD   albuterol (PROAIR HFA) 108 (90 BASE) MCG/ACT inhaler INHALE 2 PUFFS BY MOUTH EVERY 4 TO 6 HOURS AS NEEDED FOR WHEEZE prn med Yes Dayton Huston MD   AMLODIPINE BESYLATE PO Take 5 mg by mouth daily  Yes Unknown, Entered By History   citalopram (CELEXA) 40 MG tablet TAKE ONE TABLET BY MOUTH DAILY  Yes Michael Saxena MD   GABAPENTIN PO Take 900 mg by mouth At Bedtime (takes 3 x 300 mg caps)  Yes Unknown, Entered By History   HYDROcodone-acetaminophen (NORCO)  MG per tablet Take 1 tablet by mouth 3 times daily as needed for severe pain prn med Yes Unknown, Entered By History   LISINOPRIL PO Take 40 mg by mouth daily  Yes Unknown, Entered By History   metoprolol (TOPROL-XL) 25 MG 24 hr tablet Take 1 tablet by mouth daily.  Yes Abrahan Holbrook, DO   Multiple Vitamins-Minerals (CENTRUM SILVER) per tablet Take 1 tablet by mouth daily.  Yes Unknown, Entered By History   omeprazole (PRILOSEC) 20 MG capsule TAKE ONE CAPSULE BY MOUTH DAILY 30 MINUTES BEFORE BREAKFAST  Yes Bill Smith MD   order for DME Equipment being ordered: Joshua Kirby, PAKIMBERLY Okeefe,  PharmD

## 2019-03-11 NOTE — PLAN OF CARE
Discharge Planner SLP   Patient plan for discharge: Did not state  Current status: A bedside swallow evaluation was completed this pm.  Patient presents with moderate oral-pharyngeal dysphagia/aspiration risk at bedside.  Deficits/risk factors include hx of dysphagia with temporary modification to nectar thick liquids and puree food during a previous admit, softer foods at baseline per family report, new RUL pneumonia, loose dentures, oral motor deficits, SOB, delayed swallows, need for double swallows, and increased SOB with thin liquid trials.  Unable to rule out silent aspiration at bedside.  Patient appeared to tolerate pudding and nectar thick liquids with mod-max assist/cues.  Recommend a dysphagia diet level 1 and nectar thick liquids with 1:1 assist/supervision, sit up at 90 degrees, small bites/sips, liquids by spoon, slow rate, crush meds and give with puree, hold po if respiratory status declines.  Plan to continue swallow Tx with ongoing assessment of swallow safety.  Will attempt diet/liquid upgrades as indicated.  Barriers to return to prior living situation: To be determined  Recommendations for discharge: Return to [facility] pending eval for overall needs; short term SLP swallow Tx  Rationale for recommendations: SLP swallow Tx to maximize safety for a least restrictive diet       Entered by: Lorene Hernandez 03/11/2019 5:02 PM

## 2019-03-11 NOTE — PROGRESS NOTES
RECEIVING UNIT ED HANDOFF REVIEW    ED Nurse Handoff Report was reviewed by: Ana Major on March 11, 2019 at 2:55 PM

## 2019-03-11 NOTE — ED PROVIDER NOTES
History     Chief Complaint:  Fall     The history is provided by the EMS personnel. The history is limited by the condition of the patient.      Laya Lutz is a 89 year old female with a history of atrial fibrillation, HTN, cognitive impairment who presents to the emergency department for evaluation of a fall. Per EMS, the patient lives at assisted living and was found on the floor of her bathroom this morning by staff, prompting them to contact EMS. It is unknown why she fell. EMS placed the patient in a C-collar. They remark the patient appeared fatigued en route, falling asleep between questions, which they were unsure whether or not was her baseline. EMS indicates the patient was complaining of pain, which they believe was primarily right-sided, although they were unsure.    Allergies:  Fish Allergy  Novocain [Procaine Hcl]  Shellfish-Derived Products  Fosamax [Alendronate Sodium]  Penicillins     Medications:    Tylenol  Albuterol  Fosamax  Amlodipine besylate  Pulmicort  Celexa  Gabapentin  Lisinopril  Metoprolol  Singulair  Prilosec  Deltasone  Sertraline     Past Medical History:    Asthma  Atrial fibrillation  Depression  HTN  UTI  Cognitive impairment    Past Surgical History:    Appendectomy  Cataract IOL  Cholecystectomy  Discectomy lumbar posterior microscopic one level  Hysterectomy    Family History:    No past pertinent family history.    Social History:  Presents alone.  Never smoker.  Positive for alcohol use.   Marital Status:   [5]     Review of Systems   Reason unable to perform ROS: patient not answering questions, frequently falls asleep.         Physical Exam     Patient Vitals for the past 24 hrs:   BP Temp Temp src Pulse Heart Rate Resp SpO2   03/11/19 1230 (!) 140/93 -- -- 135 133 19 96 %   03/11/19 1215 142/90 -- -- 134 129 28 92 %   03/11/19 1145 (!) 149/103 -- -- 135 134 27 90 %   03/11/19 1130 (!) 136/101 -- -- 130 128 22 93 %   03/11/19 1115 (!) 142/98 -- -- 131 118 17  93 %   03/11/19 1112 -- 98.2  F (36.8  C) Rectal -- -- -- --   03/11/19 1058 (!) 167/122 97.7  F (36.5  C) Oral -- 126 20 91 %     Physical Exam  GENERAL: well developed, pleasant. Decreased mental status. Opens eyes to voice.  HEAD: atraumatic  EYES: pupils reactive, extraocular muscles intact, conjunctivae normal. Left eye is mattery, have to pry the lids open to look at her eye. No injection to her conjunctiva.   ENT:  mucus membranes moist  NECK:  trachea midline, normal range of motion  RESPIRATORY: Coarse breath sounds in the left. Pursed lips, looks to have increased work of breathing.  CVS: normal S1/S2, no murmurs, intact distal pulses. Tachycardia.  ABDOMEN: soft, nontender, nondistention  MUSCULOSKELETAL: no deformities  SKIN: warm and dry, no acute rashes or ulceration  NEURO: GCS 15, cranial nerves intact. Awakens to voice, seems sleepy.  PSYCH:  Difficult to obtain.    Emergency Department Course   ECG:  Time: 1059  Vent. Rate 133 bpm. MN interval *. QRS duration 72. QT/QTc 286/425. P-R-T axis * -23 -8.  Supraventricular tachycardia.  Anterior infarct, age undetermined.   Abnormal ECG.  Read time: 1105    Imaging:  Radiographic findings were communicated with the family who voiced understanding of the findings.    XR Chest 2 views:   Consistent with right-sided pneumonia. Follow-up to ensure  complete resolution. As per radiology.     CT Head without contrast:   1. No acute abnormality.  2. Atrophy of the brain.  White matter changes consistent with  sequelae of small vessel ischemic disease.  3. 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  4. No change.  As per radiology.    CT Cervical Spine w/o Contrast:  1. No evidence of acute trauma, but motion artifacts limit detail.  2. Multilevel degenerative disc disease and degenerative facet  arthropathy, unchanged.  3. Right upper lobe pulmonary infiltrate laterally consistent with  pneumonia. As per radiology.    Laboratory:  UA with micro: Ketones 5, Blood Trace,  Albumin 30, Nitrite Positive, Leukocyte Esterase Small, WBC 21, WBC Clumps Present, Bacteria Few, Mucous Present, Hyaline Casts 3, o/w negative    CBC: WBC: 15.2 (H), HGB: 13.6, PLT: 247  CMP: Glucose 122 (H), Na 125 (L), Chloride 90 (L), Creatinine 1.46 (H), GFR Estimate 31 (L), Albumin 3.0 (L), AST 78 (H), o/w WNL     BNP: 86966 (H)    INR: 1.08    Influenza A/B antigen: negative    1100 Troponin I: 3.131 (HH)    Urine culture pending.  Blood culture x2 pending.    Interventions:  1133  mL IV BOLUS  1216 Rocephin 1 g IV  1254 Rocephin 1 g IV  1255 DuoNeb 3 mL Nebulization  1256 Lopressor 5 mg IV  1311 Zithromax 500 mg IV  1317 Romycin left eye given  1319 Lopressor 5 mg IV    Emergency Department Course:  Nursing notes and vitals reviewed. 1105 I performed an exam of the patient as documented above.     EKG obtained in the ED, see results above.     IV inserted. Medicine administered as documented above. Blood drawn. This was sent to the lab for further testing, results above.    The patient was sent for a XR Chest, CT Head, CT C Spine while in the emergency department, findings above.     1250 I rechecked the patient and discussed the results of her workup thus far.     1308 I spoke with Dr. Zuñiga, hospitalist, who agreed to admit the patient.    Findings and plan explained to the daughter who consents to admission. Discussed the patient with Dr. Zuñiga, who will admit the patient to a medical telemetry bed for further monitoring, evaluation, and treatment.    I personally reviewed the laboratory results with the daughter and answered all related questions prior to admission.    Impression & Plan      Medical Decision Making:    Patient presents from assisted living where they found her down on the ground in the bathroom.  Per EMS assisted living notes that they do not know her very well and that the family sets up her medications.  I did talk to her daughter that was in an RV in [place] or  [place] and notes that the other daughter that is listed would be a better resource.  1 of the phone numbers was disconnected and the other one I left a message on.  I have called several times attempting to get a hold of the daughter in town.    Patient looks dyspneic and slightly sedated.  Head CT and neck CT were obtained showing no acute findings although there is noted pulmonary infiltrate in the right upper lobe as seen on CT neck.  Chest x-ray looks concerning as well.  BNP is quite elevated although I am not getting a significant sense of failure on her x-ray for this degree of lab abnormality.  Troponin is elevated as well likely stress response.  Patient felt warm to touch although her oral and rectal temperature was normal.  Urinalysis looks concerning for UTI.    Patient had blood cultures and urine culture obtained and was given initially Rocephin for UTI but after seeing a chest x-ray and findings on CT another gram of Rocephin and Zithromax were ordered.  Patient was noted to be tachycardic in the look to be sinus on her initial EKG but nurse noted that at times she would go faster into the 150s likely atrial fibrillation.  Patient was given metoprolol x3 with resolution of her tachycardia.  Her blood pressure has remained stable.  Patient was given oxygen and duo nebs.      Have held on diuretics at this time.  Patient is DNR/DNI per past records.    Diagnosis:    ICD-10-CM   1. Pneumonia of right upper lobe due to infectious organism (H) J18.1   2. Fall, initial encounter W19.XXXA   3. Elevated troponin R74.8   4. Hyponatremia E87.1   5. Tachycardia R00.0   6. UTI (urinary tract infection) with pyuria N39.0       Disposition:  Admitted to Dr. Zuñiga.    ILauro, am serving as a scribe on 3/11/2019 at 11:08 AM to personally document services performed by Hesham Springer MD based on my observations and the provider's statements to me.     Lauro Cisse  3/11/2019    EMERGENCY  DEPARTMENT       Hesham Springer MD  03/11/19 9084

## 2019-03-11 NOTE — CODE/RAPID RESPONSE
[facility]  House [facility] RRT Note  3/11/2019   Time Called: 1828  RRT called for: difficulty breathing  Code Status: DNR/DNI    Assessment & Plan   I was paged to the bedside to evaluate Ms. Laya Lutz for an acute episode of difficulty breathing. Patient was recently admitted from the emergency department for sepsis 2/2 RUL pneumonia along with UTI and SVT.   On my initial exam, patient was using accessory muscles to breath on 10L facemask, satting in the upper 90s. NIV was applied to patient with elevated PEEP. I suspect worsening of her pneumonia vs fluid volume overload    Diagnosis:  -- acute hypoxic respiratory failure 2/2 cardiogenic pulmonary edema vs worsening PNA and sepsis  -- NSTEMI with heart failure exacerbation  -- sepsis  -- hyponatremia    Interventions ordered/provided:  -- NIV, BiPAP 14/7/50%  -- xopenex nebulizer   -- added scheduled xopenex/atrovent  -- 80 mg IV lasix (patient is hyponatremic and may have s/s of sepsis, however the patient is in acute respiratory distress with a BNP of 50,000, previous admission was 30,000)  -- I suspect her elevated WBC, could be related to her NSTEMI. She is grossly volume overloaded on exam. She is not/has not been febrile, she was only tachycardic during her run of SVT and her lactic acid is normal.     At the conclusion of this RRT patient was transferred to [facility] per her primary hospitalist.    Interval History     Ms. Laya Lutz is a 89 year old female who was admitted on 3/11/2019 for shortness of breath.    Her history is significant for:  Past Medical History:   Diagnosis Date     Asthma      Atrial fibrillation (H)      Depression      Unspecified essential hypertension      Past Surgical History:   Procedure Laterality Date     APPENDECTOMY       CATARACT IOL, RT/LT       CHOLECYSTECTOMY       DISCECTOMY LUMBAR POSTERIOR MICROSCOPIC ONE LEVEL  9/17/2012    Procedure: DISCECTOMY LUMBAR POSTERIOR MICROSCOPIC ONE LEVEL;   MICRODISCECTOMY LEFT L3-4;  Surgeon: Abrahan Cabral MD;  Location: [facility] OR     HYSTERECTOMY         Allergies   Allergies   Allergen Reactions     Fish Allergy      Throat swelled, can tolerate shrimp,crab     Novocain [Procaine Hcl] Anaphylaxis     Face swelled difficulty breathing     Shellfish-Derived Products Shortness Of Breath     Fosamax [Alendronate Sodium]      Penicillins      Unsure of reaction       Physical Exam   Physical Exam   Constitutional: She appears distressed.   HENT:   Head: Normocephalic and atraumatic.   Eyes: Pupils are equal, round, and reactive to light.   Neck: Normal range of motion. JVD present.   Cardiovascular: Normal pulses. An irregular rhythm present. Exam reveals gallop and S3.   Pulmonary/Chest: Accessory muscle usage present. Tachypnea noted. She is in respiratory distress. She has rales.   Abdominal: Soft. She exhibits no distension.   Musculoskeletal: Normal range of motion. She exhibits edema (+3 pretibial).   Skin: Skin is warm and dry. Capillary refill takes 2 to 3 seconds.   Psychiatric: Her speech is normal and behavior is normal. Her mood appears anxious.       Vital Signs with Ranges:  Temp:  [97.5  F (36.4  C)-98.2  F (36.8  C)] 97.5  F (36.4  C)  Pulse:  [] 83  Heart Rate:  [] 96  Resp:  [17-37] 28  BP: (109-167)/() 123/83  SpO2:  [90 %-100 %] 100 %  No intake/output data recorded.    Data     EKG:  -- none performed    ABG:  -No lab results found in last 7 days.    Troponin:    Recent Labs   Lab Test 03/11/19  1555  07/31/12  1123   TROPI 4.957*   < >  --    TROPONIN  --   --  0.13*    < > = values in this interval not displayed.       IMAGING: (X-ray/CT/MRI)   Recent Results (from the past 24 hour(s))   XR Chest 1 View    Narrative    CHEST ONE VIEW  3/11/2019 12:14 PM     HISTORY: cough, weakness    COMPARISON: 5/22/2017    FINDINGS : Increased opacity at the lateral right upper lobe is  consistent with infiltrate/pneumonia. Subtle curvilinear  opacity in  the lateral lower right lung is similar but slightly more prominent  than on the previous exam. Minimal focal left upper lobe opacity. Left  lung otherwise clear. Heart and pulmonary vessels within normal  limits. Tortuous thoracic aorta with calcification. Degenerative  changes left shoulder. Scoliosis.      Impression    IMPRESSION: Consistent with right-sided pneumonia. Follow-up to ensure  complete resolution.    PO MORRISON MD   CT Head w/o Contrast    Narrative    CT SCAN OF THE HEAD WITHOUT CONTRAST   3/11/2019 12:19 PM     HISTORY: Trauma, decreased mental status.    TECHNIQUE:  Axial images of the head and coronal reformations without  IV contrast material. Radiation dose for this scan was reduced using  automated exposure control, adjustment of the mA and/or kV according  to patient size, or iterative reconstruction technique.    COMPARISON: 11/21/2017    FINDINGS:  There is generalized atrophy of the brain.  There is low  attenuation in the white matter of the cerebral hemispheres consistent  with sequelae of small vessel ischemic disease. Old infarct is seen in  the right superior cerebellar hemisphere. Old lacunar infarct is seen  in head of the right caudate nucleus. There is no evidence of  intracranial hemorrhage, mass, acute infarct or anomaly.     The visualized portions of the sinuses and mastoids appear normal.  There is no evidence of trauma.      Impression    IMPRESSION:   1. No acute abnormality.  2. Atrophy of the brain.  White matter changes consistent with  sequelae of small vessel ischemic disease.  3. 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  4. No change.     ROMIE DAMIAN MD   CT Cervical Spine w/o Contrast    Narrative    CT CERVICAL SPINE WITHOUT CONTRAST   3/11/2019 12:19 PM     HISTORY: Trauma, decreased mental status.     TECHNIQUE: Axial images of the cervical spine were obtained without  intravenous contrast. Multiplanar reformations were performed.   Radiation dose for  this scan was reduced using automated exposure  control, adjustment of the mA and/or kV according to patient size, or  iterative reconstruction technique.    COMPARISON: 12/12/2014    FINDINGS: There is no evidence of fracture, but motion artifacts limit  detail for small fractures. There is multilevel degenerative disc  disease and degenerative facet arthropathy. No evidence of hematoma.  Paraspinous soft tissues appear normal. Scoliosis convex to the left.    Alveolar infiltrates with intermixed groundglass densities are noted  in lateral aspect of the right upper lobe suggestive of pneumonia.      Impression    IMPRESSION:  1. No evidence of acute trauma, but motion artifacts limit detail.  2. Multilevel degenerative disc disease and degenerative facet  arthropathy, unchanged.  3. Right upper lobe pulmonary infiltrate laterally consistent with  pneumonia.    ROMIE DAMIAN MD       CBC with Diff:  Recent Labs   Lab Test 03/11/19  1100   WBC 15.2*   HGB 13.6   MCV 90      INR 1.08      No results found for: RETICABSCT  No results found for: RETP    Lactic Acid:    Lactic Acid   Date Value Ref Range Status   03/11/2019 1.4 0.7 - 2.0 mmol/L Final   05/22/2017 1.4 0.7 - 2.1 mmol/L Final   12/29/2016 1.1 0.7 - 2.1 mmol/L Final     No results found for: LACTS     Comprehensive Metabolic Panel:  Recent Labs   Lab 03/11/19  1555 03/11/19  1100   *  127* 125*   POTASSIUM 4.9 5.2   CHLORIDE 98 90*   CO2 20 23   ANIONGAP 10 12   * 122*   BUN 27 28   CR 1.33* 1.46*   GFRESTIMATED 34* 31*   GFRESTBLACK 40* 36*   NANCY 8.2* 8.5   MAG  --  2.0   PHOS  --  4.4   PROTTOTAL  --  7.0   ALBUMIN  --  3.0*   BILITOTAL  --  0.9   ALKPHOS  --  117   AST  --  78*   ALT  --  29       INR:    Recent Labs   Lab Test 03/11/19  1100   INR 1.08       D-DIMER:  No components found for: DDIMER    BNP:  No results found for: BNP    UA:  Recent Labs   Lab 03/11/19  1111   COLOR Yellow   APPEARANCE Slightly Cloudy   URINEGLC  Negative   URINEBILI Negative   URINEKETONE 5*   SG 1.013   UBLD Trace*   URINEPH 6.0   PROTEIN 30*   NITRITE Positive*   LEUKEST Small*   RBCU 1   WBCU 21*       Time Spent on this Encounter   I spent 30 minutes (1828 - 1908) of critical care time on the unit/floor managing the care of Laya Lutz. Upon evaluation, this patient had a high probability of imminent or life-threatening deterioration due to acute hypoxic respiratory failure, which required my direct attention, intervention, and personal management. 100% of my time was spent at the bedside counseling the patient and/or coordinating care regarding services listed in this note.    CELESTINO Velazquez, CNP  Hospitalist - House [facility]  Text Page  [phone])

## 2019-03-12 ENCOUNTER — APPOINTMENT (OUTPATIENT)
Dept: SPEECH THERAPY | Facility: CLINIC | Age: 84
DRG: 871 | End: 2019-03-12
Payer: MEDICARE

## 2019-03-12 ENCOUNTER — APPOINTMENT (OUTPATIENT)
Dept: GENERAL RADIOLOGY | Facility: CLINIC | Age: 84
DRG: 871 | End: 2019-03-12
Attending: INTERNAL MEDICINE
Payer: MEDICARE

## 2019-03-12 ENCOUNTER — APPOINTMENT (OUTPATIENT)
Dept: CARDIOLOGY | Facility: CLINIC | Age: 84
DRG: 871 | End: 2019-03-12
Attending: INTERNAL MEDICINE
Payer: MEDICARE

## 2019-03-12 LAB
ALBUMIN SERPL-MCNC: 2.4 G/DL (ref 3.4–5)
ALP SERPL-CCNC: 92 U/L (ref 40–150)
ALT SERPL W P-5'-P-CCNC: 24 U/L (ref 0–50)
ANION GAP SERPL CALCULATED.3IONS-SCNC: 11 MMOL/L (ref 3–14)
AST SERPL W P-5'-P-CCNC: 70 U/L (ref 0–45)
BASOPHILS # BLD AUTO: 0 10E9/L (ref 0–0.2)
BASOPHILS NFR BLD AUTO: 0.1 %
BILIRUB SERPL-MCNC: 0.5 MG/DL (ref 0.2–1.3)
BUN SERPL-MCNC: 30 MG/DL (ref 7–30)
CALCIUM SERPL-MCNC: 8.1 MG/DL (ref 8.5–10.1)
CHLORIDE SERPL-SCNC: 96 MMOL/L (ref 94–109)
CO2 SERPL-SCNC: 22 MMOL/L (ref 20–32)
CREAT SERPL-MCNC: 1.48 MG/DL (ref 0.52–1.04)
DIFFERENTIAL METHOD BLD: NORMAL
EOSINOPHIL # BLD AUTO: 0 10E9/L (ref 0–0.7)
EOSINOPHIL NFR BLD AUTO: 0.2 %
ERYTHROCYTE [DISTWIDTH] IN BLOOD BY AUTOMATED COUNT: 13.8 % (ref 10–15)
GFR SERPL CREATININE-BSD FRML MDRD: 30 ML/MIN/{1.73_M2}
GLUCOSE SERPL-MCNC: 90 MG/DL (ref 70–99)
HCT VFR BLD AUTO: 35.7 % (ref 35–47)
HGB BLD-MCNC: 11.9 G/DL (ref 11.7–15.7)
IMM GRANULOCYTES # BLD: 0 10E9/L (ref 0–0.4)
IMM GRANULOCYTES NFR BLD: 0.2 %
INTERPRETATION ECG - MUSE: NORMAL
INTERPRETATION ECG - MUSE: NORMAL
LMWH PPP CHRO-ACNC: 0.1 IU/ML
LMWH PPP CHRO-ACNC: 0.2 IU/ML
LYMPHOCYTES # BLD AUTO: 1.2 10E9/L (ref 0.8–5.3)
LYMPHOCYTES NFR BLD AUTO: 12.5 %
MAGNESIUM SERPL-MCNC: 1.7 MG/DL (ref 1.6–2.3)
MCH RBC QN AUTO: 29.5 PG (ref 26.5–33)
MCHC RBC AUTO-ENTMCNC: 33.3 G/DL (ref 31.5–36.5)
MCV RBC AUTO: 89 FL (ref 78–100)
MONOCYTES # BLD AUTO: 0.9 10E9/L (ref 0–1.3)
MONOCYTES NFR BLD AUTO: 8.9 %
NEUTROPHILS # BLD AUTO: 7.4 10E9/L (ref 1.6–8.3)
NEUTROPHILS NFR BLD AUTO: 78.1 %
NRBC # BLD AUTO: 0 10*3/UL
NRBC BLD AUTO-RTO: 0 /100
PLATELET # BLD AUTO: 219 10E9/L (ref 150–450)
POTASSIUM SERPL-SCNC: 4.4 MMOL/L (ref 3.4–5.3)
PROT SERPL-MCNC: 6 G/DL (ref 6.8–8.8)
RBC # BLD AUTO: 4.03 10E12/L (ref 3.8–5.2)
SODIUM SERPL-SCNC: 129 MMOL/L (ref 133–144)
TROPONIN I SERPL-MCNC: 2.63 UG/L (ref 0–0.04)
TROPONIN I SERPL-MCNC: 4.27 UG/L (ref 0–0.04)
WBC # BLD AUTO: 9.5 10E9/L (ref 4–11)

## 2019-03-12 PROCEDURE — 93306 TTE W/DOPPLER COMPLETE: CPT | Mod: 26 | Performed by: INTERNAL MEDICINE

## 2019-03-12 PROCEDURE — 71046 X-RAY EXAM CHEST 2 VIEWS: CPT

## 2019-03-12 PROCEDURE — 25000125 ZZHC RX 250: Performed by: INTERNAL MEDICINE

## 2019-03-12 PROCEDURE — A9270 NON-COVERED ITEM OR SERVICE: HCPCS | Mod: GY | Performed by: INTERNAL MEDICINE

## 2019-03-12 PROCEDURE — 92526 ORAL FUNCTION THERAPY: CPT | Mod: GN | Performed by: SPEECH-LANGUAGE PATHOLOGIST

## 2019-03-12 PROCEDURE — 40000141 ZZH STATISTIC PERIPHERAL IV START W/O US GUIDANCE

## 2019-03-12 PROCEDURE — 25000125 ZZHC RX 250: Performed by: PHYSICIAN ASSISTANT

## 2019-03-12 PROCEDURE — A9270 NON-COVERED ITEM OR SERVICE: HCPCS | Mod: GY | Performed by: PHYSICIAN ASSISTANT

## 2019-03-12 PROCEDURE — 36415 COLL VENOUS BLD VENIPUNCTURE: CPT | Performed by: INTERNAL MEDICINE

## 2019-03-12 PROCEDURE — 83735 ASSAY OF MAGNESIUM: CPT | Performed by: INTERNAL MEDICINE

## 2019-03-12 PROCEDURE — 25000132 ZZH RX MED GY IP 250 OP 250 PS 637: Mod: GY | Performed by: PHYSICIAN ASSISTANT

## 2019-03-12 PROCEDURE — 84484 ASSAY OF TROPONIN QUANT: CPT | Performed by: INTERNAL MEDICINE

## 2019-03-12 PROCEDURE — 21000001 ZZH R&B HEART CARE

## 2019-03-12 PROCEDURE — 25000132 ZZH RX MED GY IP 250 OP 250 PS 637: Mod: GY | Performed by: NURSE PRACTITIONER

## 2019-03-12 PROCEDURE — 93306 TTE W/DOPPLER COMPLETE: CPT

## 2019-03-12 PROCEDURE — 80053 COMPREHEN METABOLIC PANEL: CPT | Performed by: INTERNAL MEDICINE

## 2019-03-12 PROCEDURE — 94640 AIRWAY INHALATION TREATMENT: CPT | Mod: 76

## 2019-03-12 PROCEDURE — 94640 AIRWAY INHALATION TREATMENT: CPT

## 2019-03-12 PROCEDURE — 85025 COMPLETE CBC W/AUTO DIFF WBC: CPT | Performed by: INTERNAL MEDICINE

## 2019-03-12 PROCEDURE — 40000275 ZZH STATISTIC RCP TIME EA 10 MIN

## 2019-03-12 PROCEDURE — 25000125 ZZHC RX 250: Performed by: NURSE PRACTITIONER

## 2019-03-12 PROCEDURE — 99233 SBSQ HOSP IP/OBS HIGH 50: CPT | Performed by: INTERNAL MEDICINE

## 2019-03-12 PROCEDURE — 99207 ZZC CDG-MDM COMPONENT: MEETS LOW - DOWN CODED: CPT | Performed by: INTERNAL MEDICINE

## 2019-03-12 PROCEDURE — 25000132 ZZH RX MED GY IP 250 OP 250 PS 637: Mod: GY | Performed by: INTERNAL MEDICINE

## 2019-03-12 PROCEDURE — 40000884 ZZH STATISTIC STEP DOWN HRS NIGHT

## 2019-03-12 PROCEDURE — 25000128 H RX IP 250 OP 636: Performed by: INTERNAL MEDICINE

## 2019-03-12 PROCEDURE — 85520 HEPARIN ASSAY: CPT | Performed by: INTERNAL MEDICINE

## 2019-03-12 PROCEDURE — 94660 CPAP INITIATION&MGMT: CPT

## 2019-03-12 PROCEDURE — A9270 NON-COVERED ITEM OR SERVICE: HCPCS | Mod: GY | Performed by: NURSE PRACTITIONER

## 2019-03-12 RX ORDER — FUROSEMIDE 10 MG/ML
20 INJECTION INTRAMUSCULAR; INTRAVENOUS ONCE
Status: COMPLETED | OUTPATIENT
Start: 2019-03-12 | End: 2019-03-12

## 2019-03-12 RX ORDER — METOPROLOL TARTRATE 1 MG/ML
5 INJECTION, SOLUTION INTRAVENOUS EVERY 5 MIN PRN
Status: DISCONTINUED | OUTPATIENT
Start: 2019-03-12 | End: 2019-03-12

## 2019-03-12 RX ORDER — LEVOFLOXACIN 5 MG/ML
250 INJECTION, SOLUTION INTRAVENOUS
Status: DISCONTINUED | OUTPATIENT
Start: 2019-03-13 | End: 2019-03-14 | Stop reason: ALTCHOICE

## 2019-03-12 RX ORDER — METOPROLOL TARTRATE 1 MG/ML
5 INJECTION, SOLUTION INTRAVENOUS EVERY 6 HOURS PRN
Status: DISCONTINUED | OUTPATIENT
Start: 2019-03-12 | End: 2019-03-16 | Stop reason: HOSPADM

## 2019-03-12 RX ORDER — METOPROLOL SUCCINATE 50 MG/1
50 TABLET, EXTENDED RELEASE ORAL DAILY
Status: DISCONTINUED | OUTPATIENT
Start: 2019-03-13 | End: 2019-03-13

## 2019-03-12 RX ORDER — METOPROLOL SUCCINATE 25 MG/1
25 TABLET, EXTENDED RELEASE ORAL ONCE
Status: COMPLETED | OUTPATIENT
Start: 2019-03-12 | End: 2019-03-12

## 2019-03-12 RX ADMIN — METOPROLOL TARTRATE 5 MG: 5 INJECTION, SOLUTION INTRAVENOUS at 02:46

## 2019-03-12 RX ADMIN — IPRATROPIUM BROMIDE 0.5 MG: 0.5 SOLUTION RESPIRATORY (INHALATION) at 15:54

## 2019-03-12 RX ADMIN — LEVALBUTEROL HYDROCHLORIDE 1.25 MG: 1.25 SOLUTION, CONCENTRATE RESPIRATORY (INHALATION) at 02:38

## 2019-03-12 RX ADMIN — DEXTRAN 70, AND HYPROMELLOSE 2910 1 DROP: 1; 3 SOLUTION/ DROPS OPHTHALMIC at 00:23

## 2019-03-12 RX ADMIN — CITALOPRAM HYDROBROMIDE 40 MG: 20 TABLET ORAL at 09:52

## 2019-03-12 RX ADMIN — ERYTHROMYCIN: 5 OINTMENT OPHTHALMIC at 00:24

## 2019-03-12 RX ADMIN — METOPROLOL SUCCINATE 25 MG: 25 TABLET, EXTENDED RELEASE ORAL at 12:28

## 2019-03-12 RX ADMIN — ERYTHROMYCIN: 5 OINTMENT OPHTHALMIC at 18:34

## 2019-03-12 RX ADMIN — DEXTRAN 70, AND HYPROMELLOSE 2910 1 DROP: 1; 3 SOLUTION/ DROPS OPHTHALMIC at 18:35

## 2019-03-12 RX ADMIN — LEVALBUTEROL HYDROCHLORIDE 1.25 MG: 1.25 SOLUTION, CONCENTRATE RESPIRATORY (INHALATION) at 15:53

## 2019-03-12 RX ADMIN — METOPROLOL TARTRATE 5 MG: 5 INJECTION, SOLUTION INTRAVENOUS at 03:02

## 2019-03-12 RX ADMIN — AMLODIPINE BESYLATE 5 MG: 5 TABLET ORAL at 09:52

## 2019-03-12 RX ADMIN — FUROSEMIDE 20 MG: 10 INJECTION, SOLUTION INTRAVENOUS at 15:12

## 2019-03-12 RX ADMIN — ERYTHROMYCIN: 5 OINTMENT OPHTHALMIC at 22:17

## 2019-03-12 RX ADMIN — ERYTHROMYCIN: 5 OINTMENT OPHTHALMIC at 10:03

## 2019-03-12 RX ADMIN — LEVALBUTEROL HYDROCHLORIDE 1.25 MG: 1.25 SOLUTION, CONCENTRATE RESPIRATORY (INHALATION) at 07:09

## 2019-03-12 RX ADMIN — METOPROLOL TARTRATE 5 MG: 5 INJECTION, SOLUTION INTRAVENOUS at 18:26

## 2019-03-12 RX ADMIN — ACETAMINOPHEN 650 MG: 325 TABLET, FILM COATED ORAL at 21:51

## 2019-03-12 RX ADMIN — ACETAMINOPHEN 650 MG: 325 TABLET, FILM COATED ORAL at 09:52

## 2019-03-12 RX ADMIN — IPRATROPIUM BROMIDE 0.5 MG: 0.5 SOLUTION RESPIRATORY (INHALATION) at 07:10

## 2019-03-12 RX ADMIN — Medication 1 MG: at 21:51

## 2019-03-12 RX ADMIN — LEVALBUTEROL HYDROCHLORIDE 1.25 MG: 1.25 SOLUTION, CONCENTRATE RESPIRATORY (INHALATION) at 19:40

## 2019-03-12 RX ADMIN — IPRATROPIUM BROMIDE 0.5 MG: 0.5 SOLUTION RESPIRATORY (INHALATION) at 19:40

## 2019-03-12 RX ADMIN — METOPROLOL SUCCINATE 25 MG: 25 TABLET, EXTENDED RELEASE ORAL at 09:52

## 2019-03-12 RX ADMIN — ATORVASTATIN CALCIUM 20 MG: 20 TABLET, FILM COATED ORAL at 21:51

## 2019-03-12 RX ADMIN — ASPIRIN 81 MG 81 MG: 81 TABLET ORAL at 09:52

## 2019-03-12 RX ADMIN — IPRATROPIUM BROMIDE 0.5 MG: 0.5 SOLUTION RESPIRATORY (INHALATION) at 02:38

## 2019-03-12 RX ADMIN — OMEPRAZOLE 20 MG: 20 CAPSULE, DELAYED RELEASE ORAL at 09:52

## 2019-03-12 RX ADMIN — Medication 1300 UNITS: at 07:40

## 2019-03-12 ASSESSMENT — ACTIVITIES OF DAILY LIVING (ADL)
WHICH_OF_THE_ABOVE_FUNCTIONAL_RISKS_HAD_A_RECENT_ONSET_OR_CHANGE?: FALL HISTORY
COGNITION: 1 - ATTENTION OR MEMORY DEFICITS
TOILETING: 2-->ASSISTIVE PERSON
AMBULATION: 1-->ASSISTIVE EQUIPMENT
RETIRED_EATING: 0-->INDEPENDENT
RETIRED_COMMUNICATION: 0-->UNDERSTANDS/COMMUNICATES WITHOUT DIFFICULTY
TRANSFERRING: 1-->ASSISTIVE EQUIPMENT
DRESS: 2-->ASSISTIVE PERSON
FALL_HISTORY_WITHIN_LAST_SIX_MONTHS: YES
ADLS_ACUITY_SCORE: 25
BATHING: 2-->ASSISTIVE PERSON
SWALLOWING: 2-->DIFFICULTY SWALLOWING LIQUIDS/FOODS

## 2019-03-12 ASSESSMENT — MIFFLIN-ST. JEOR: SCORE: 762.42

## 2019-03-12 NOTE — PLAN OF CARE
VSS. Monitor remains Sinus rhythm with frequent PACs. Pt. Transferred [place] [facility] Room [facility]2 per order. Report given to receiving nurse Tiny.

## 2019-03-12 NOTE — PROGRESS NOTES
[facility]    Hospitalist Progress Note    Assessment & Plan   Laya Lutz is a 89 year old female with PMHx of paroxysmal atrial fibrillation not on chronic anticoagulation, mild intermittent asthma, HTN, depression, neuropathy, severe osteoarthritis and scoliosis on chronic narcotics, cognitive impairment, hx of Takotsubo cardiomyopathy, and CKD III admitted on 3/11/2019 after being found down on the floor at her [facility]. History limited due to cognitive impairment; collateral information from pt's daughter, Etta, and extensive chart review. Vitals currently WNL. Pt currently stable.       Sepsis secondary to right upper lobe pneumonia  Acute hypoxic respiratory failure secondary to above  Mild intermittent asthma, not in acute exacerbation:  Not hypoxic on admission, but now requiring 3 LPM supplemental oxygen via NC. WBC 15.2. CXR with right upper lobe pneumonia. Given dose of azithromycin and ceftriaxone in the ED. Rapid influenza negative.   --continue  IV Levaquin  -- Xoponex inhaler PRN   -- Urine legionella   -- Wean oxygen as tolerated   -- SLP to eval for dysphagia; daughter notes pt eats softer foods, has trouble chewing meat   -- Follow blood cultures, CMP, CBC      UTI: UA positive for nitrites, WBC 21.   -- IV Levaquin as above   -- Follow urine cultures        SVT, resolved  NSTEMI, likely type II in the setting of above  Hx of Takotsubo cardiomyopathy (2012): EKG on admission with SVT and HR in the 130s. Pt given Metoprolol IV 5 mg X3 with resolve of rates. Hx of Takotsubo cardiomyopathy in 8/2012 in the context of UTI. At that time, EF 35-40%, large area of apical akinesis extending to involve the distal portions of the myocardial segments, mild pulmonary HTN noted--during that hospitalization, trop peaked at 3.750; no prior stress testing or angiogram. No chest pain, dizziness, lightheadedness. BNP 74225. Trop 3.131. Does not appear overtly fluid overloaded on exam.   -- Repeat  EKG with NSR, no evidence of ischemia   -- Continue PTA BB  -- appreciate cardiology consult, troponin  Trending down, stop heparin drip        Unwitnessed fall: CT head and cervical spine negative for acute pathology. Denies focal pain elsewhere, no focal neuro deficits. .   -- PT, OT, SW consult  -- Neuro checks q4; discontinue after 24 hours      Hypochloremic hyponatremia:  -possibly hypervolemic   -will try another dose of lasix, serum osmol low      Left eye purulence: Noted dried purulence in left eye, sclera not injected. PERRLA. Hx limited at this time. Received erythromycin ophthalmic in the ED.   -- Cipro gtt BID, clean area with warm water        Paroxysmal atrial fibrillation: CHADS2-VASc 4. Not on chronic anticoagulation; takes Aspirin 81 mg po every day.   -- Continue Toprol XL 25 mg po every day    HTN: Continue PTA Norvasc 5 mg po qd and Toprol XL 25 mg po every day. Hold PTA Lisinopril 40 mg po every day.      Depression: Continue Celexa 40 mg po every day      Severe osteoarthritis  Scoliosis   Neuropathy: Continue PTA PRN Tylenol, PRN Norco. Hold gabapentin 900 mg po qhs at this time.      Cognitive impairment: Oriented to place and person, thinks it is 2018 on my interview. Noted mild cognitive impairment per daughter. Receives [facility] with ADL[facility] at [facility] and family manages medications.   -- OT consult  -- Delirium prevention protocol ordered      CKD III: Most recent creatinine from 8/2018 was 1.33.   -- Hold Lisinopril above   -- Avoid nephrotoxic agents   -- BMP in the AM      Abnormal thyroid studies: TSH 10.85, T4 1.01.   -- Repeat thyroid studies in 4-6 weeks.     Code Status: DNR/DNI     Disposition: Expected discharge in 2 days     Evette Vallejo MD  Text Page   (7am to 6pm)    Interval History   More awake today, very forgetful, denies any chest pain,admitted yesterday evening, her troponin  Are trending down.    -Data reviewed today: I reviewed all new labs and imaging results  over the last 24 hours.     Physical Exam   Temp: 98.6  F (37  C) Temp src: Oral BP: 134/81 Pulse: 88 Heart Rate: 88 Resp: 23 SpO2: 97 % O2 Device: None (Room air) Oxygen Delivery: 2 LPM  Vitals:    03/11/19 1741 03/12/19 0600   Weight: 45.8 kg (101 lb) 47.9 kg (105 lb 8 oz)     Vital Signs with Ranges  Temp:  [97.5  F (36.4  C)-98.6  F (37  C)] 98.6  F (37  C)  Pulse:  [] 88  Heart Rate:  [] 88  Resp:  [15-30] 23  BP: (103-148)/(67-94) 134/81  FiO2 (%):  [21 %-30 %] 21 %  SpO2:  [94 %-100 %] 97 %  I/O last 3 completed shifts:  In: 152.8 [P.O.:60; I.V.:92.8]  Out: 0     Constitutional: Awake and talking but confused   Respiratory: bilateral basilar crackles+  Cardiovascular: Regular rate and rhythm, normal S1 and S2, and no murmur noted  GI: Normal bowel sounds, soft, non-distended, non-tender  Skin/Integumen: No rashes, no cyanosis, 1+ edema  Neuro : moving all 4 extremities, no focal deficit noted, memory issues+     Medications     - MEDICATION INSTRUCTIONS -       - MEDICATION INSTRUCTIONS -       - MEDICATION INSTRUCTIONS -       - MEDICATION INSTRUCTIONS -       - MEDICATION INSTRUCTIONS -       - MEDICATION INSTRUCTIONS -       ACE/ARB/ARNI NOT PRESCRIBED         amLODIPine  5 mg Oral Daily     aspirin  81 mg Oral Daily     atorvastatin  20 mg Oral QPM     citalopram  40 mg Oral Daily     erythromycin   Left Eye 4x Daily     ipratropium  0.5 mg Nebulization Q6H     levalbuterol  1.25 mg Nebulization Q6H     [START ON 3/13/2019] levofloxacin  250 mg Intravenous Q48H     [START ON 3/13/2019] metoprolol succinate ER  50 mg Oral Daily     omeprazole  20 mg Oral QAM AC     sodium chloride (PF)  3 mL Intracatheter Q8H       Data   Recent Labs   Lab 03/12/19  0529 03/12/19  0120 03/12/19  0020 03/11/19  2110 03/11/19  1555 03/11/19  1100   WBC 9.5  --   --   --   --  15.2*   HGB 11.9  --   --   --   --  13.6   MCV 89  --   --   --   --  90     --   --   --   --  247   INR  --   --   --   --   --   1.08   NA  --  129*  --  126* 128*  127* 125*   POTASSIUM  --  4.4  --  4.4 4.9 5.2   CHLORIDE  --  96  --  96 98 90*   CO2  --  22  --  20 20 23   BUN  --  30  --  30 27 28   CR  --  1.48*  --  1.41* 1.33* 1.46*   ANIONGAP  --  11  --  10 10 12   NANCY  --  8.1*  --  8.1* 8.2* 8.5   GLC  --  90  --  96 106* 122*   ALBUMIN  --  2.4*  --   --   --  3.0*   PROTTOTAL  --  6.0*  --   --   --  7.0   BILITOTAL  --  0.5  --   --   --  0.9   ALKPHOS  --  92  --   --   --  117   ALT  --  24  --   --   --  29   AST  --  70*  --   --   --  78*   TROPI 2.632*  --  4.273* 4.849* 4.957* 3.131*     Recent Labs   Lab 03/12/19  0120 03/11/19  2110 03/11/19  1555 03/11/19  1100   GLC 90 96 106* 122*       Imaging:   Recent Results (from the past 24 hour(s))   XR Chest 2 Views    Narrative    CHEST TWO VIEWS 3/12/2019 9:35 AM     HISTORY: Follow up infiltrate. Assess for effusion and pulmonary  edema.    COMPARISON: March 11, 2019     FINDINGS: Small bilateral pleural effusions and associated compressive  atelectasis and/or infiltrate. Remaining lungs clear. The cardiac  silhouette is not enlarged. Pulmonary vasculature is unremarkable.      Impression    IMPRESSION: Small bilateral pleural effusions and associated  atelectasis and/or infiltrate. No definite vascular congestion  demonstrated.    CLAYTON JONES MD

## 2019-03-12 NOTE — CONSULTS
"CLINICAL NUTRITION SERVICES  -  ASSESSMENT NOTE      Future/Additional Recommendations:     Once pt able to safely take po, will plan to send a nutrition supplement with meals for added cals/pro     Malnutrition:   % Weight Loss:  Weight loss does not meet criteria for malnutrition (fluctuates up/down)  % Intake:  Unable to assess  Subcutaneous Fat Loss:  None observed  Muscle Loss:  Temporal region  - mild depletion and Acromion bone region  - mild depletion  Fluid Retention:  None noted    Malnutrition Diagnosis: Unable to determine due to pt sleeping soundly and unable to obtain diet history         REASON FOR ASSESSMENT  Laya Lutz is a 89 year old female seen by Registered Dietitian for Admission Nutrition Risk Screen for reduced oral intake over the last month      NUTRITION HISTORY  Unable to obtain diet history - pt sleeping soundly, did not disturb  Note pt also with some confusion  Per chart, pt lives at an DEEPAK (assume meals are in the dining room)  No mention of decreased po intake PTA in H&P  Per H&P - \"Felt \"awful\" yesterday. Pt's son in law thought she seemed fine yesterday\"  Family reports that pt eats softer foods    Fish/shellfish allergy    Pt was taking a multivitamin PTA      CURRENT NUTRITION ORDERS  Diet Order:     DD1, NTL   Room Service with Assist    3/11: SLP papo ---> Patient presents with moderate oral-pharyngeal dysphagia/aspiration risk at bedside. Rec DD1, NTL    NA in room this morning - states that pt hasn't eaten today, \"she was coughing when taking some applesauce, so holding po intake for now\"        NUTRITION FOCUSED PHYSICAL ASSESSMENT (NFPA)  Completed:        Yes:          Visual assessment only - face, clavicle           Observed  Muscle Wasting  - temples, clavicle  Obtained from Chart/Interdisciplinary Team  No LE edema  Altered cognition     ANTHROPOMETRICS  Height: 4' 9\"  Weight:(3/12) 47.9 kg / 105 lbs 8 oz  Body mass index is 22.83 kg/m .  Weight Status:  Normal " BMI  IBW: 42 kg  % IBW: 114%  Weight History:  Wt appears to fluctuate  Wt Readings from Last 10 Encounters:   03/12/19 47.9 kg (105 lb 8 oz)   11/21/17 48.1 kg (106 lb)   10/08/17 52.6 kg (116 lb)   05/25/17 49.2 kg (108 lb 7.5 oz)   04/21/17 51.7 kg (114 lb)   01/02/17 47.6 kg (104 lb 15 oz)   04/02/16 48.2 kg (106 lb 4.8 oz)   03/15/16 49.7 kg (109 lb 8 oz)   04/01/15 54.6 kg (120 lb 5.9 oz)   02/22/15 51.3 kg (113 lb)     Per Care Everywhere: clinic visits  1/25/19 111 lbs  10/29/18 108 lbs    LABS  3/12: Na 129 (L)  ++ troponins     MEDICATIONS  Medications reviewed      ASSESSED NUTRITION NEEDS PER APPROVED PRACTICE GUIDELINES:    Dosing Weight 47.9 kg  Estimated Energy Needs: 1604-6740 kcals (25-30 Kcal/Kg)  Justification: maintenance  Estimated Protein Needs: 48-57 grams protein (1-1.2 g pro/Kg)  Justification: CKD      MALNUTRITION:  % Weight Loss:  Weight loss does not meet criteria for malnutrition (fluctuates up/down)  % Intake:  Unable to assess  Subcutaneous Fat Loss:  None observed  Muscle Loss:  Temporal region  - mild depletion and Acromion bone region  - mild depletion  Fluid Retention:  None noted    Malnutrition Diagnosis: Unable to determine due to pt sleeping soundly and unable to obtain diet history      NUTRITION DIAGNOSIS:  Inadequate oral intake related to pt coughing with po intake as evidenced by po intake being held at this time      NUTRITION INTERVENTIONS  Recommendations / Nutrition Prescription  DD1, NTL (SLP recs)  .      Implementation  Nutrition education: Not appropriate at this time due to patient condition  Once pt able to safely take po, will plan to send a nutrition supplement with meals for added cals/pro  .      Nutrition Goals  Pt to be able to tolerate po intake in the next 24-48 hrs  .      MONITORING AND EVALUATION:  Progress towards goals will be monitored and evaluated per protocol and Practice Guidelines

## 2019-03-12 NOTE — PLAN OF CARE
Discharge Planner SLP   Patient plan for discharge: Not able to state.   Current status:  Patient seen for swallow treatment bedside with her lunch. Patient demonstrated reduced bolus control and premature spillage of nectar thick liquids by spoon and cup. No immediate Sx of aspiration, but can not rule out silent aspiration. Pureed textures she was not able to manipulate the bolus, poor propulsion of the bolus and delayed swallow response with coughing with pureed peaches and meats. Patient continues to be a high risk for aspiration. Recommend: 1. Downgrade diet to clear liquids nectar thick by spoon. 2. Upright, liquids by spoon, verify each swallow and slow rate of feeding. Crush medications and place in apple sauce.   Barriers to return to prior living situation: Acuity of her illness.   Recommendations for discharge: TCU  Rationale for recommendations: Patient will need on going ST needs for dysphagia for safe diet advancement as tolerated. Patient is below her baseline.        Entered by: Ruth Pichardo 03/12/2019 2:52 PM

## 2019-03-12 NOTE — PLAN OF CARE
OT and PT: orders rec'd and chart reviewed. Pt admitted due to fall, history of dementia, pt with elevated troponins cardiac issues to be treated medically, pt diagnosed with Sepsis secondary to right upper lobe pneumonia and UTI. Spoke with nursing have increased pain, focus on L hip, nursing recommend to hold off at this time.

## 2019-03-12 NOTE — PLAN OF CARE
Pt disoriented to time/situation. VSS, weaned to RA. Pt denies CP/SOB. Tele: ST, HR up to 140's. PRN Metoprolol given with improvement. Heparin gtt @550u/hr. NPO. Incontinent of urine. Plan for echo today.

## 2019-03-12 NOTE — PROGRESS NOTES
[facility]  Cardiology Progress Note    Date of Service (when I saw the patient): 03/12/2019  Primary Cardiologist:        Assessment & Plan   aLya Lutz is a 89 year old female who was admitted on 3/11/2019.     1.  : Elevated troponin  - Peaked at 4.8, trending down, likely demand ischemia in the setting of pneumonia  - Denies chest pain  - Stop Heparin gtt  - Continue on aspirin, metoprolol, amlodipine, and statin  - OK to transfer to floor    2.  : Acute on chronic systolic heart failure  - Echo demonstrates severely reduced LV 25-30%% from previous 35-40%, anterior septal and apical wall akinesis ( unchanged from previous study), moderate MR  - IV diuresis yesterday, output unknown due to incontinence.   - Up ~ 4 lbs ( ? Accuracy of weight)  - Creatinine mildly elevated 1.48, euvolemic upon exam.   - Hold on diuretics for now. Hyponatremic 129, Potassium stable 4.4  - Tele demonstrates NSR w/ PACs, rates ranging from 's, increase Metoprolol to 50mg daily. Extra 25mg dose now.     3.  : Hypertension  -Mildly elevated today, increase Toprol to 50mg daily    4.  Pneumonia  - Chest xray demonstrated increased opacity at the lateral right upper lobe, consistent with infiltrate/pneumonia  - On CELESTINO Spaulding CNP  Text Page  (M-F, 7:30 am - 4:00 pm)    Interval History   Denies chest pain, shortness of breath, palpitations, PND, orthopnea, presyncope, or syncope. No LE edema, + tenderness. Altered cognition.    Physical Exam   Temp: 98.1  F (36.7  C) Temp src: Oral BP: (!) 138/94 Pulse: 92 Heart Rate: 89 Resp: 20 SpO2: 97 % O2 Device: None (Room air) Oxygen Delivery: 2 LPM  Vitals:    03/11/19 1741 03/12/19 0600   Weight: 45.8 kg (101 lb) 47.9 kg (105 lb 8 oz)     Vital Signs with Ranges  Temp:  [97.5  F (36.4  C)-98.2  F (36.8  C)] 98.1  F (36.7  C)  Pulse:  [] 92  Heart Rate:  [] 89  Resp:  [16-37] 20  BP: (103-167)/() 138/94  FiO2 (%):  [21 %-30 %] 21  %  SpO2:  [90 %-100 %] 97 %  I/O last 3 completed shifts:  In: 152.8 [P.O.:60; I.V.:92.8]  Out: 0     GEN:  In general, this is a frail elderly female in no acute distress.   HEENT:  Pupils equal, round. Sclerae nonicteric. Clear oropharynx. Mucous membranes moist.  NECK: Supple, no masses appreciated. Trachea midline. No JVD   C/V:  Regular rate and reg rhythm w/ frequent PAC, no murmur, rub or gallop. No S3 or RV heave.   RESP: Respirations are unlabored. No use of accessory muscles. Clear to auscultation bilaterally without wheezing, rales, or rhonchi.  GI: Abdomen soft, nontender, nondistended. No HSM appreciated.   EXTREM: No  LE edema. No cyanosis or clubbing.  NEURO: Alert with impaired cognition. Poor historian  PSYCH: Normal affect.  SKIN: Warm and dry. No rashes or petechiae appreciated.       Medications     - MEDICATION INSTRUCTIONS -       - MEDICATION INSTRUCTIONS -       HEParin 650 Units/hr (03/12/19 0738)     - MEDICATION INSTRUCTIONS -       - MEDICATION INSTRUCTIONS -       - MEDICATION INSTRUCTIONS -       - MEDICATION INSTRUCTIONS -       ACE/ARB/ARNI NOT PRESCRIBED         amLODIPine  5 mg Oral Daily     aspirin  81 mg Oral Daily     atorvastatin  20 mg Oral QPM     citalopram  40 mg Oral Daily     erythromycin   Left Eye 4x Daily     ipratropium  0.5 mg Nebulization Q6H     levalbuterol  1.25 mg Nebulization Q6H     levofloxacin  250 mg Intravenous Q24H     metoprolol succinate ER  25 mg Oral Daily     omeprazole  20 mg Oral QAM AC     sodium chloride (PF)  3 mL Intracatheter Q8H       Data   Reviewed       Lynne Monroy, CELESTINO CNP 3/12/2019

## 2019-03-12 NOTE — PROVIDER NOTIFICATION
Provider Notification    Notified Person Name: Sami Brennan NP    Notification Date/Time: 3/11/19 @2200    Notification Interaction: in person    Comment: Patient tachycardic, ST 's. BP stable. EKG obtained. 2.5mg PRN IV Metoprolol ordered and given. Also updated regarding lab results. Sodium 126, no change in plan of care.

## 2019-03-12 NOTE — PROVIDER NOTIFICATION
MD Notification    Notified Person Name: Dr. Mukherjee    Notification Date/Time: 3/12/19 @0115     Notification Interaction: telephone    Purpose of Notification: Patient with severe left leg cramping. Tele now shows a bigeminal rhythm.    Orders Received: Ordered CMP and Mg to be drawn now

## 2019-03-12 NOTE — PROVIDER NOTIFICATION
MD Notification    Notified Person Name:  Lonny    Notification Date/Time: 3/12/19 @0215    Notification Interaction: telephone    Purpose of Notification: HR sustaining 130's-140's    Orders Received: Give 5mg IV Metoprolol up to q5 minutes x3    Comments: HR decreased from 140's to 90's after giving 5mg IV Metoprolol x2. BP remained stable 120/80

## 2019-03-13 ENCOUNTER — APPOINTMENT (OUTPATIENT)
Dept: GENERAL RADIOLOGY | Facility: CLINIC | Age: 84
DRG: 871 | End: 2019-03-13
Attending: INTERNAL MEDICINE
Payer: MEDICARE

## 2019-03-13 LAB
ANION GAP SERPL CALCULATED.3IONS-SCNC: 11 MMOL/L (ref 3–14)
BUN SERPL-MCNC: 28 MG/DL (ref 7–30)
CALCIUM SERPL-MCNC: 8.6 MG/DL (ref 8.5–10.1)
CHLORIDE SERPL-SCNC: 91 MMOL/L (ref 94–109)
CO2 SERPL-SCNC: 25 MMOL/L (ref 20–32)
CREAT SERPL-MCNC: 1.35 MG/DL (ref 0.52–1.04)
GFR SERPL CREATININE-BSD FRML MDRD: 34 ML/MIN/{1.73_M2}
GLUCOSE SERPL-MCNC: 132 MG/DL (ref 70–99)
POTASSIUM SERPL-SCNC: 3.2 MMOL/L (ref 3.4–5.3)
POTASSIUM SERPL-SCNC: 4.1 MMOL/L (ref 3.4–5.3)
SODIUM SERPL-SCNC: 127 MMOL/L (ref 133–144)

## 2019-03-13 PROCEDURE — 40000275 ZZH STATISTIC RCP TIME EA 10 MIN

## 2019-03-13 PROCEDURE — A9270 NON-COVERED ITEM OR SERVICE: HCPCS | Mod: GY | Performed by: NURSE PRACTITIONER

## 2019-03-13 PROCEDURE — A9270 NON-COVERED ITEM OR SERVICE: HCPCS | Mod: GY | Performed by: INTERNAL MEDICINE

## 2019-03-13 PROCEDURE — 80048 BASIC METABOLIC PNL TOTAL CA: CPT | Performed by: INTERNAL MEDICINE

## 2019-03-13 PROCEDURE — A9270 NON-COVERED ITEM OR SERVICE: HCPCS | Performed by: PHYSICIAN ASSISTANT

## 2019-03-13 PROCEDURE — 25000128 H RX IP 250 OP 636: Performed by: INTERNAL MEDICINE

## 2019-03-13 PROCEDURE — 99233 SBSQ HOSP IP/OBS HIGH 50: CPT | Performed by: INTERNAL MEDICINE

## 2019-03-13 PROCEDURE — 84132 ASSAY OF SERUM POTASSIUM: CPT | Performed by: INTERNAL MEDICINE

## 2019-03-13 PROCEDURE — 71045 X-RAY EXAM CHEST 1 VIEW: CPT

## 2019-03-13 PROCEDURE — 25000132 ZZH RX MED GY IP 250 OP 250 PS 637: Mod: GY | Performed by: INTERNAL MEDICINE

## 2019-03-13 PROCEDURE — 25000128 H RX IP 250 OP 636: Performed by: HOSPITALIST

## 2019-03-13 PROCEDURE — 36415 COLL VENOUS BLD VENIPUNCTURE: CPT | Performed by: INTERNAL MEDICINE

## 2019-03-13 PROCEDURE — 94640 AIRWAY INHALATION TREATMENT: CPT | Mod: 76

## 2019-03-13 PROCEDURE — A9270 NON-COVERED ITEM OR SERVICE: HCPCS | Mod: GY | Performed by: PHYSICIAN ASSISTANT

## 2019-03-13 PROCEDURE — 25000132 ZZH RX MED GY IP 250 OP 250 PS 637: Mod: GY | Performed by: PHYSICIAN ASSISTANT

## 2019-03-13 PROCEDURE — 25000132 ZZH RX MED GY IP 250 OP 250 PS 637: Mod: GY | Performed by: NURSE PRACTITIONER

## 2019-03-13 PROCEDURE — 94640 AIRWAY INHALATION TREATMENT: CPT

## 2019-03-13 PROCEDURE — 25000125 ZZHC RX 250: Performed by: PHYSICIAN ASSISTANT

## 2019-03-13 PROCEDURE — 25000125 ZZHC RX 250: Performed by: INTERNAL MEDICINE

## 2019-03-13 PROCEDURE — 21000001 ZZH R&B HEART CARE

## 2019-03-13 PROCEDURE — 25000125 ZZHC RX 250: Performed by: NURSE PRACTITIONER

## 2019-03-13 PROCEDURE — 99232 SBSQ HOSP IP/OBS MODERATE 35: CPT | Performed by: INTERNAL MEDICINE

## 2019-03-13 RX ORDER — POTASSIUM CHLORIDE 1500 MG/1
20-40 TABLET, EXTENDED RELEASE ORAL
Status: DISCONTINUED | OUTPATIENT
Start: 2019-03-13 | End: 2019-03-16 | Stop reason: HOSPADM

## 2019-03-13 RX ORDER — POTASSIUM CHLORIDE 29.8 MG/ML
20 INJECTION INTRAVENOUS
Status: DISCONTINUED | OUTPATIENT
Start: 2019-03-13 | End: 2019-03-16 | Stop reason: HOSPADM

## 2019-03-13 RX ORDER — CEFTRIAXONE 1 G/1
1 INJECTION, POWDER, FOR SOLUTION INTRAMUSCULAR; INTRAVENOUS EVERY 24 HOURS
Status: DISCONTINUED | OUTPATIENT
Start: 2019-03-13 | End: 2019-03-16 | Stop reason: HOSPADM

## 2019-03-13 RX ORDER — POTASSIUM CHLORIDE 1.5 G/1.58G
20-40 POWDER, FOR SOLUTION ORAL
Status: DISCONTINUED | OUTPATIENT
Start: 2019-03-13 | End: 2019-03-16 | Stop reason: HOSPADM

## 2019-03-13 RX ORDER — METOPROLOL SUCCINATE 25 MG/1
25 TABLET, EXTENDED RELEASE ORAL ONCE
Status: COMPLETED | OUTPATIENT
Start: 2019-03-13 | End: 2019-03-13

## 2019-03-13 RX ORDER — POTASSIUM CL/LIDO/0.9 % NACL 10MEQ/0.1L
10 INTRAVENOUS SOLUTION, PIGGYBACK (ML) INTRAVENOUS
Status: DISCONTINUED | OUTPATIENT
Start: 2019-03-13 | End: 2019-03-16 | Stop reason: HOSPADM

## 2019-03-13 RX ORDER — FUROSEMIDE 10 MG/ML
20 INJECTION INTRAMUSCULAR; INTRAVENOUS ONCE
Status: COMPLETED | OUTPATIENT
Start: 2019-03-13 | End: 2019-03-13

## 2019-03-13 RX ORDER — LISINOPRIL 10 MG/1
10 TABLET ORAL DAILY
Status: DISCONTINUED | OUTPATIENT
Start: 2019-03-13 | End: 2019-03-16 | Stop reason: HOSPADM

## 2019-03-13 RX ORDER — POTASSIUM CHLORIDE 7.45 MG/ML
10 INJECTION INTRAVENOUS
Status: DISCONTINUED | OUTPATIENT
Start: 2019-03-13 | End: 2019-03-16 | Stop reason: HOSPADM

## 2019-03-13 RX ADMIN — Medication 10 MEQ: at 15:31

## 2019-03-13 RX ADMIN — DEXTRAN 70, AND HYPROMELLOSE 2910 1 DROP: 1; 3 SOLUTION/ DROPS OPHTHALMIC at 10:55

## 2019-03-13 RX ADMIN — IPRATROPIUM BROMIDE 0.5 MG: 0.5 SOLUTION RESPIRATORY (INHALATION) at 17:55

## 2019-03-13 RX ADMIN — ERYTHROMYCIN: 5 OINTMENT OPHTHALMIC at 20:32

## 2019-03-13 RX ADMIN — IPRATROPIUM BROMIDE 0.5 MG: 0.5 SOLUTION RESPIRATORY (INHALATION) at 07:39

## 2019-03-13 RX ADMIN — HYDROCODONE BITARTRATE AND ACETAMINOPHEN 1 TABLET: 10; 325 TABLET ORAL at 20:08

## 2019-03-13 RX ADMIN — CEFTRIAXONE SODIUM 1 G: 1 INJECTION, POWDER, FOR SOLUTION INTRAMUSCULAR; INTRAVENOUS at 20:26

## 2019-03-13 RX ADMIN — CITALOPRAM HYDROBROMIDE 40 MG: 20 TABLET ORAL at 08:37

## 2019-03-13 RX ADMIN — Medication 10 MEQ: at 12:07

## 2019-03-13 RX ADMIN — LEVALBUTEROL HYDROCHLORIDE 1.25 MG: 1.25 SOLUTION, CONCENTRATE RESPIRATORY (INHALATION) at 00:00

## 2019-03-13 RX ADMIN — IPRATROPIUM BROMIDE 0.5 MG: 0.5 SOLUTION RESPIRATORY (INHALATION) at 00:00

## 2019-03-13 RX ADMIN — LEVALBUTEROL HYDROCHLORIDE 1.25 MG: 1.25 SOLUTION, CONCENTRATE RESPIRATORY (INHALATION) at 17:54

## 2019-03-13 RX ADMIN — ATORVASTATIN CALCIUM 20 MG: 20 TABLET, FILM COATED ORAL at 20:08

## 2019-03-13 RX ADMIN — Medication 10 MEQ: at 13:01

## 2019-03-13 RX ADMIN — LISINOPRIL 10 MG: 10 TABLET ORAL at 12:24

## 2019-03-13 RX ADMIN — OMEPRAZOLE 20 MG: 20 CAPSULE, DELAYED RELEASE ORAL at 08:37

## 2019-03-13 RX ADMIN — METOPROLOL TARTRATE 5 MG: 5 INJECTION, SOLUTION INTRAVENOUS at 01:26

## 2019-03-13 RX ADMIN — LEVALBUTEROL HYDROCHLORIDE 1.25 MG: 1.25 SOLUTION, CONCENTRATE RESPIRATORY (INHALATION) at 12:09

## 2019-03-13 RX ADMIN — OLANZAPINE 2.5 MG: 5 TABLET, ORALLY DISINTEGRATING ORAL at 13:48

## 2019-03-13 RX ADMIN — LEVALBUTEROL HYDROCHLORIDE 1.25 MG: 1.25 SOLUTION, CONCENTRATE RESPIRATORY (INHALATION) at 07:39

## 2019-03-13 RX ADMIN — METOPROLOL SUCCINATE 25 MG: 25 TABLET, EXTENDED RELEASE ORAL at 12:25

## 2019-03-13 RX ADMIN — LEVOFLOXACIN 250 MG: 5 INJECTION, SOLUTION INTRAVENOUS at 18:40

## 2019-03-13 RX ADMIN — DEXTRAN 70, AND HYPROMELLOSE 2910 1 DROP: 1; 3 SOLUTION/ DROPS OPHTHALMIC at 20:31

## 2019-03-13 RX ADMIN — ERYTHROMYCIN: 5 OINTMENT OPHTHALMIC at 10:55

## 2019-03-13 RX ADMIN — Medication 10 MEQ: at 14:05

## 2019-03-13 RX ADMIN — Medication 1 MG: at 20:35

## 2019-03-13 RX ADMIN — AMLODIPINE BESYLATE 5 MG: 5 TABLET ORAL at 08:37

## 2019-03-13 RX ADMIN — METOPROLOL SUCCINATE 50 MG: 50 TABLET, EXTENDED RELEASE ORAL at 06:02

## 2019-03-13 RX ADMIN — FUROSEMIDE 20 MG: 10 INJECTION, SOLUTION INTRAVENOUS at 13:48

## 2019-03-13 RX ADMIN — ASPIRIN 81 MG 81 MG: 81 TABLET ORAL at 08:37

## 2019-03-13 RX ADMIN — IPRATROPIUM BROMIDE 0.5 MG: 0.5 SOLUTION RESPIRATORY (INHALATION) at 12:08

## 2019-03-13 ASSESSMENT — ACTIVITIES OF DAILY LIVING (ADL)
ADLS_ACUITY_SCORE: 29
ADLS_ACUITY_SCORE: 27
ADLS_ACUITY_SCORE: 29
ADLS_ACUITY_SCORE: 29
ADLS_ACUITY_SCORE: 27
ADLS_ACUITY_SCORE: 25

## 2019-03-13 ASSESSMENT — MIFFLIN-ST. JEOR: SCORE: 700.73

## 2019-03-13 NOTE — PROVIDER NOTIFICATION
"Notified Person:  Hospitalist  Notified Persons Name: Dr. Vallejo  Notification Date/Time: 3/13/19; 0738  Notification Interaction: web paged  Purpose of Notification: Low K and Na  Orders Received:   Comments:  Text paged, \"FYI K 3.2 no K replacement orders. Na 127.\"    "

## 2019-03-13 NOTE — PROGRESS NOTES
[facility]  Cardiology Progress Note    Date of Service (when I saw the patient): 03/13/2019       Assessment & Plan   Laya Lutz is a 89 year old female who was admitted on 3/11/2019.     1.  : Elevated troponin  - Peaked at 4.8, trending down, likely demand ischemia in the setting of pneumonia  - Denies chest pain  - Continue on aspirin, metoprolol, amlodipine, and statin    2.  : Acute on chronic systolic heart failure  - Echo demonstrates severely reduced LV 25-30%% from previous 35-40%, anterior septal and apical wall akinesis ( unchanged from previous study), moderate MR  - last dose of IV diuresis 3/12/19, output unknown due to incontinence.   - Down 12lb  ( ? Accuracy of weight)  - Creatinine 1.35  - Tele demonstrates NSR w/PVC's bigeminal pattern, rates in the 120's, will  increase Metoprolol to 75mg daily. Extra 25mg dose now.     3.  : Hypertension  -Elevated today Toprol increased to 75 mg daily  - Add lisinopril 10mg daily  - Sodium 127, avoid diuretics  -  BMP in am    4.  Pneumonia  - Chest xray demonstrated increased opacity at the lateral right upper lobe, consistent with infiltrate/pneumonia  - On CELESTINO Spaulding CNP  Text Page  (M-F, 7:30 am - 4:00 pm)    Interval History   Denies chest pain, shortness of breath worse today, denies palpitations, PND, orthopnea, presyncope, or syncope. No LE edema, + tenderness. Altered cognition. 's, NSR with frequent PVC's bigeminal pattern. BB increased, ACE added.     Physical Exam   Temp: 98.4  F (36.9  C) Temp src: Oral BP: (!) 138/104 Pulse: 124 Heart Rate: 125 Resp: 18 SpO2: 96 % O2 Device: None (Room air)    Vitals:    03/11/19 1741 03/12/19 0600 03/13/19 0409   Weight: 45.8 kg (101 lb) 47.9 kg (105 lb 8 oz) 42.2 kg (93 lb)     Vital Signs with Ranges  Temp:  [97.9  F (36.6  C)-98.6  F (37  C)] 98.4  F (36.9  C)  Pulse:  [] 124  Heart Rate:  [] 125  Resp:  [16-24] 18  BP: (117-152)/()  138/104  SpO2:  [95 %-99 %] 96 %  I/O last 3 completed shifts:  In: 660 [P.O.:660]  Out: -     GEN:  In general, this is a frail elderly female in no acute distress.   HEENT:  Pupils equal, round. Sclerae nonicteric. Clear oropharynx. Mucous membranes moist.  NECK: Supple, no masses appreciated. Trachea midline. No JVD   C/V:  Regular rate and reg rhythm w/ frequent PAC, no murmur, rub or gallop. No S3 or RV heave.   RESP: Respirations are unlabored. No use of accessory muscles. Clear to auscultation bilaterally without wheezing, rales, or rhonchi.  GI: Abdomen soft, nontender, nondistended. No HSM appreciated.   EXTREM: No  LE edema. No cyanosis or clubbing.  NEURO: Alert with impaired cognition. Poor historian  PSYCH: Normal affect.  SKIN: Warm and dry. No rashes or petechiae appreciated.       Medications     - MEDICATION INSTRUCTIONS -       - MEDICATION INSTRUCTIONS -       - MEDICATION INSTRUCTIONS -       - MEDICATION INSTRUCTIONS -       - MEDICATION INSTRUCTIONS -       - MEDICATION INSTRUCTIONS -       ACE/ARB/ARNI NOT PRESCRIBED         amLODIPine  5 mg Oral Daily     aspirin  81 mg Oral Daily     atorvastatin  20 mg Oral QPM     citalopram  40 mg Oral Daily     erythromycin   Left Eye 4x Daily     ipratropium  0.5 mg Nebulization Q6H     levalbuterol  1.25 mg Nebulization Q6H     levofloxacin  250 mg Intravenous Q48H     metoprolol succinate ER  50 mg Oral Daily     omeprazole  20 mg Oral QAM AC     sodium chloride (PF)  3 mL Intracatheter Q8H       Data   Reviewed       CELESTINO Brandon CNP 3/12/2019

## 2019-03-13 NOTE — PLAN OF CARE
Pt A&Ox2 - oriented to self, & place. Very confused. Repetitive questions. Anxious. Tele is SR and ST during night. IV metoprolol given x1. Other vital signs stable. Incontinent of urine and bowels. Multiple BM's overnight. Purewick external catheter in place. Turn/repo q2hrs. Did not sleep much overnight. Placed on 1L O2 at night for comfort. Will continue POC.

## 2019-03-13 NOTE — PROGRESS NOTES
MD Notification    Notified Person: MD    Notified Person Name: On-Call Hospitalist     Notification Date/Time: 03/12/19 @ 2157    Notification Interaction: Answering service     Purpose of Notification: Pt very anxious, may need anti-anxiety medication     Orders Received: One-time dose of Zyprexa.     Comments:

## 2019-03-13 NOTE — PLAN OF CARE
"  SLP- Attempt swallow tx. Roommates visitor assisting pt with cues for breathing as pt repeats \"I can't breathe\" frequently. Pt also told visitor she is asthmatic. Attempt PO trials; pt refused. \"It hurts to breathe\". Cues for relaxed breathing, repositioned without relief. Will notify RN.  "

## 2019-03-13 NOTE — SIGNIFICANT EVENT
Significant Event Note  Called for swelling, painful erythema after levofloxacin infusion around the IV site. No respiratory distress, hives, noted. Advised to stop the levofloxacin and change back to ceftriaxone and azithromcyin. Allergy placed in chart. RRT if concerns about anaphylaxis. Switch IV to other side    Mack Garcia, DO

## 2019-03-13 NOTE — PROGRESS NOTES
[facility]    Hospitalist Progress Note    Assessment & Plan   Laya Lutz is a 89 year old female with PMHx of paroxysmal atrial fibrillation not on chronic anticoagulation, mild intermittent asthma, HTN, depression, neuropathy, severe osteoarthritis and scoliosis on chronic narcotics, cognitive impairment, hx of Takotsubo cardiomyopathy, and CKD III admitted on 3/11/2019 after being found down on the floor at her DEEPAK. History limited due to cognitive impairment; collateral information from pt's daughter, Etta, and extensive chart review. Vitals currently WNL. Pt currently stable.       Sepsis secondary to right upper lobe pneumonia  Acute hypoxic respiratory failure secondary to above  Mild intermittent asthma, not in acute exacerbation:  Not hypoxic on admission, but now requiring 3 LPM supplemental oxygen via NC. WBC 15.2. CXR with right upper lobe pneumonia. Given dose of azithromycin and ceftriaxone in the ED. Rapid influenza negative.   --continue  IV Levaquin  -- Xoponex inhaler PRN   -- oxygen weaned   -- SLP evaluated patient    -- Follow blood cultures, CMP, CBC      UTI: UA positive for nitrites, WBC 21.   -- IV Levaquin as above   -- Follow urine cultures, showing klebsiella 3 diff strains        SVT, resolved  NSTEMI, likely type II in the setting of above  Hx of Takotsubo cardiomyopathy (2012): EKG on admission with SVT and HR in the 130s. Pt given Metoprolol IV 5 mg X3 with resolve of rates. Hx of Takotsubo cardiomyopathy in 8/2012 in the context of UTI. At that time, EF 35-40%, large area of apical akinesis extending to involve the distal portions of the myocardial segments, mild pulmonary HTN noted--during that hospitalization, trop peaked at 3.750; no prior stress testing or angiogram. No chest pain, dizziness, lightheadedness. BNP 74517. Trop 3.131. Does not appear overtly fluid overloaded on exam.   -- Repeat EKG with NSR, no evidence of ischemia   -- Continue PTA BB  --  appreciate cardiology consult, troponin  Trending down, stop heparin drip   --Continue on aspirin, metoprolol, amlodipine, and statin    Unwitnessed fall: CT head and cervical spine negative for acute pathology. Denies focal pain elsewhere, no focal neuro deficits. .   -- PT, OT, SW consult  -- patient  Will need tcu on discharge      Hypochloremic hyponatremia:  -possibly hypervolemic   -will try another dose of lasix, serum osmol low   -repeat chest x ray  Pending      Left eye purulence: Noted dried purulence in left eye, sclera not injected. PERRLA. Hx limited at this time. Received erythromycin ophthalmic in the ED.   -- Cipro gtt BID, clean area with warm water        Paroxysmal atrial fibrillation: CHADS2-VASc 4. Not on chronic anticoagulation; takes Aspirin 81 mg po every day.   -- Continue Toprol XL 50 mg daily     HTN: Continue PTA Norvasc 5 mg po qd and Toprol XL 25 mg po every day. Hold PTA Lisinopril 40 mg po every day.      Depression: Continue Celexa 40 mg po every day      Severe osteoarthritis  Scoliosis   Neuropathy: Continue PTA PRN Tylenol, PRN Norco. Hold gabapentin 900 mg po qhs at this time.      Cognitive impairment: Oriented to place and person, thinks it is 2018 on my interview. Noted mild cognitive impairment per daughter. Receives assistance with ADLs at [facility] and family manages medications.   -- OT consult  -- Delirium prevention protocol ordered      CKD III: Most recent creatinine from 8/2018 was 1.33.   -- creatinine coming down   -- Avoid nephrotoxic agents   -- BMP in the AM      Abnormal thyroid studies: TSH 10.85, T4 1.01.   -- Repeat thyroid studies in 4-6 weeks.     Code Status: DNR/DNI     Disposition: Expected discharge in 2 days     Evette Vallejo MD  Text Page   (7am to 6pm)    Interval History   More alert today, complaints of  Shortness of breath  , her saturation at  while she is complaining about shortness of breath , urine culture is back ,sensitivities  pending.    -Data reviewed today: I reviewed all new labs and imaging results over the last 24 hours.     Physical Exam   Temp: 98.4  F (36.9  C) Temp src: Oral BP: (!) 138/104 Pulse: 124 Heart Rate: 125 Resp: 18 SpO2: 96 % O2 Device: Nasal cannula Oxygen Delivery: 2 LPM  Vitals:    03/11/19 1741 03/12/19 0600 03/13/19 0409   Weight: 45.8 kg (101 lb) 47.9 kg (105 lb 8 oz) 42.2 kg (93 lb)     Vital Signs with Ranges  Temp:  [97.9  F (36.6  C)-98.5  F (36.9  C)] 98.4  F (36.9  C)  Pulse:  [] 124  Heart Rate:  [] 125  Resp:  [16-20] 18  BP: (117-152)/() 138/104  SpO2:  [95 %-99 %] 96 %  I/O last 3 completed shifts:  In: 660 [P.O.:660]  Out: 150 [Urine:150]    Constitutional: Awake and talking but confused   Respiratory: bilateral basilar crackles+  Cardiovascular: Regular rate and rhythm, normal S1 and S2, and no murmur noted  GI: Normal bowel sounds, soft, non-distended, non-tender  Skin/Integumen: No rashes, no cyanosis, 1+ edema  Neuro : moving all 4 extremities, no focal deficit noted, memory issues+     Medications     - MEDICATION INSTRUCTIONS -       - MEDICATION INSTRUCTIONS -       - MEDICATION INSTRUCTIONS -       - MEDICATION INSTRUCTIONS -       - MEDICATION INSTRUCTIONS -       - MEDICATION INSTRUCTIONS -       ACE/ARB/ARNI NOT PRESCRIBED         amLODIPine  5 mg Oral Daily     aspirin  81 mg Oral Daily     atorvastatin  20 mg Oral QPM     citalopram  40 mg Oral Daily     erythromycin   Left Eye 4x Daily     furosemide  20 mg Intravenous Once     ipratropium  0.5 mg Nebulization Q6H     levalbuterol  1.25 mg Nebulization Q6H     levofloxacin  250 mg Intravenous Q48H     lisinopril  10 mg Oral Daily     [START ON 3/14/2019] metoprolol succinate ER  75 mg Oral Daily     omeprazole  20 mg Oral QAM AC     sodium chloride (PF)  3 mL Intracatheter Q8H       Data   Recent Labs   Lab 03/13/19  0621 03/12/19  0529 03/12/19  0120 03/12/19  0020 03/11/19  2110  03/11/19  1100   WBC  --  9.5  --   --    --   --  15.2*   HGB  --  11.9  --   --   --   --  13.6   MCV  --  89  --   --   --   --  90   PLT  --  219  --   --   --   --  247   INR  --   --   --   --   --   --  1.08   *  --  129*  --  126*   < > 125*   POTASSIUM 3.2*  --  4.4  --  4.4   < > 5.2   CHLORIDE 91*  --  96  --  96   < > 90*   CO2 25  --  22  --  20   < > 23   BUN 28  --  30  --  30   < > 28   CR 1.35*  --  1.48*  --  1.41*   < > 1.46*   ANIONGAP 11  --  11  --  10   < > 12   NANCY 8.6  --  8.1*  --  8.1*   < > 8.5   *  --  90  --  96   < > 122*   ALBUMIN  --   --  2.4*  --   --   --  3.0*   PROTTOTAL  --   --  6.0*  --   --   --  7.0   BILITOTAL  --   --  0.5  --   --   --  0.9   ALKPHOS  --   --  92  --   --   --  117   ALT  --   --  24  --   --   --  29   AST  --   --  70*  --   --   --  78*   TROPI  --  2.632*  --  4.273* 4.849*   < > 3.131*    < > = values in this interval not displayed.     Recent Labs   Lab 03/13/19  0621 03/12/19  0120 03/11/19  2110 03/11/19  1555 03/11/19  1100   * 90 96 106* 122*       Imaging:   No results found for this or any previous visit (from the past 24 hour(s)).

## 2019-03-13 NOTE — PROVIDER NOTIFICATION
MD Notification    Notified Person: MD    Notified Person Name: Dr. Garcia    Notification Date/Time: 3/13/19 9422    Notification Interaction: Telephone     Purpose of Notification: Redness at IV insertion site and surrounding tissue 15 mins after starting IV Levaquin ABX     Orders Received: Stop IV Levaquin, watch for new orders     Comments:

## 2019-03-13 NOTE — PLAN OF CARE
OT/CR and PT: orders rec'd, attempted eval, however,noted pt with HR in the 120;s ranging 122-126 and pt reports having a hard time breathing, O2 sats 96%, nursing notified and will hold OT today.

## 2019-03-13 NOTE — PLAN OF CARE
Trsnf from CCU at 1600.  A/O to self only.  Not OOB, bedrest.  [employer] urine and stool x1, purewick placed.  Barrier cream applied to coccyx for carol red.  Needs encouragment to move BLE, pt cries out to touch.  -150's SVT, cards notified, orders received for 5mg IV Metoprolol Q6hrs PRN, given x1 with improvement in rate.  BP stable.  DaughterEtta notifed via phone and updated on bed change and progression throughout the day.

## 2019-03-14 ENCOUNTER — APPOINTMENT (OUTPATIENT)
Dept: OCCUPATIONAL THERAPY | Facility: CLINIC | Age: 84
DRG: 871 | End: 2019-03-14
Attending: INTERNAL MEDICINE
Payer: MEDICARE

## 2019-03-14 ENCOUNTER — APPOINTMENT (OUTPATIENT)
Dept: SPEECH THERAPY | Facility: CLINIC | Age: 84
DRG: 871 | End: 2019-03-14
Payer: MEDICARE

## 2019-03-14 ENCOUNTER — APPOINTMENT (OUTPATIENT)
Dept: CT IMAGING | Facility: CLINIC | Age: 84
DRG: 871 | End: 2019-03-14
Attending: INTERNAL MEDICINE
Payer: MEDICARE

## 2019-03-14 LAB
ANION GAP SERPL CALCULATED.3IONS-SCNC: 8 MMOL/L (ref 3–14)
BACTERIA SPEC CULT: ABNORMAL
BUN SERPL-MCNC: 29 MG/DL (ref 7–30)
CALCIUM SERPL-MCNC: 8.6 MG/DL (ref 8.5–10.1)
CHLORIDE SERPL-SCNC: 96 MMOL/L (ref 94–109)
CO2 SERPL-SCNC: 27 MMOL/L (ref 20–32)
CREAT SERPL-MCNC: 1.35 MG/DL (ref 0.52–1.04)
D DIMER PPP FEU-MCNC: 1.8 UG/ML FEU (ref 0–0.5)
GFR SERPL CREATININE-BSD FRML MDRD: 34 ML/MIN/{1.73_M2}
GLUCOSE SERPL-MCNC: 119 MG/DL (ref 70–99)
POTASSIUM SERPL-SCNC: 3.5 MMOL/L (ref 3.4–5.3)
SODIUM SERPL-SCNC: 131 MMOL/L (ref 133–144)
SPECIMEN SOURCE: ABNORMAL

## 2019-03-14 PROCEDURE — A9270 NON-COVERED ITEM OR SERVICE: HCPCS | Mod: GY | Performed by: PHYSICIAN ASSISTANT

## 2019-03-14 PROCEDURE — 25000132 ZZH RX MED GY IP 250 OP 250 PS 637: Mod: GY | Performed by: INTERNAL MEDICINE

## 2019-03-14 PROCEDURE — 94640 AIRWAY INHALATION TREATMENT: CPT

## 2019-03-14 PROCEDURE — 97530 THERAPEUTIC ACTIVITIES: CPT | Mod: GO | Performed by: OCCUPATIONAL THERAPIST

## 2019-03-14 PROCEDURE — 97167 OT EVAL HIGH COMPLEX 60 MIN: CPT | Mod: GO | Performed by: OCCUPATIONAL THERAPIST

## 2019-03-14 PROCEDURE — 92526 ORAL FUNCTION THERAPY: CPT | Mod: GN | Performed by: SPEECH-LANGUAGE PATHOLOGIST

## 2019-03-14 PROCEDURE — 80048 BASIC METABOLIC PNL TOTAL CA: CPT | Performed by: NURSE PRACTITIONER

## 2019-03-14 PROCEDURE — A9270 NON-COVERED ITEM OR SERVICE: HCPCS | Mod: GY | Performed by: INTERNAL MEDICINE

## 2019-03-14 PROCEDURE — A9270 NON-COVERED ITEM OR SERVICE: HCPCS | Mod: GY | Performed by: NURSE PRACTITIONER

## 2019-03-14 PROCEDURE — 21000001 ZZH R&B HEART CARE

## 2019-03-14 PROCEDURE — 25000128 H RX IP 250 OP 636: Performed by: INTERNAL MEDICINE

## 2019-03-14 PROCEDURE — 25000132 ZZH RX MED GY IP 250 OP 250 PS 637: Mod: GY | Performed by: PHYSICIAN ASSISTANT

## 2019-03-14 PROCEDURE — 99232 SBSQ HOSP IP/OBS MODERATE 35: CPT | Performed by: INTERNAL MEDICINE

## 2019-03-14 PROCEDURE — 40000275 ZZH STATISTIC RCP TIME EA 10 MIN

## 2019-03-14 PROCEDURE — 25000128 H RX IP 250 OP 636: Performed by: HOSPITALIST

## 2019-03-14 PROCEDURE — 36415 COLL VENOUS BLD VENIPUNCTURE: CPT | Performed by: NURSE PRACTITIONER

## 2019-03-14 PROCEDURE — 71250 CT THORAX DX C-: CPT

## 2019-03-14 PROCEDURE — 97535 SELF CARE MNGMENT TRAINING: CPT | Mod: GO | Performed by: OCCUPATIONAL THERAPIST

## 2019-03-14 PROCEDURE — 99233 SBSQ HOSP IP/OBS HIGH 50: CPT | Performed by: INTERNAL MEDICINE

## 2019-03-14 PROCEDURE — A9270 NON-COVERED ITEM OR SERVICE: HCPCS | Performed by: PHYSICIAN ASSISTANT

## 2019-03-14 PROCEDURE — 25000132 ZZH RX MED GY IP 250 OP 250 PS 637: Mod: GY | Performed by: NURSE PRACTITIONER

## 2019-03-14 PROCEDURE — 85379 FIBRIN DEGRADATION QUANT: CPT | Performed by: INTERNAL MEDICINE

## 2019-03-14 PROCEDURE — 25000125 ZZHC RX 250: Performed by: NURSE PRACTITIONER

## 2019-03-14 PROCEDURE — 25000125 ZZHC RX 250: Performed by: PHYSICIAN ASSISTANT

## 2019-03-14 PROCEDURE — 36415 COLL VENOUS BLD VENIPUNCTURE: CPT | Performed by: INTERNAL MEDICINE

## 2019-03-14 RX ORDER — FUROSEMIDE 10 MG/ML
40 INJECTION INTRAMUSCULAR; INTRAVENOUS ONCE
Status: COMPLETED | OUTPATIENT
Start: 2019-03-14 | End: 2019-03-14

## 2019-03-14 RX ORDER — METOPROLOL SUCCINATE 100 MG/1
100 TABLET, EXTENDED RELEASE ORAL DAILY
Status: DISCONTINUED | OUTPATIENT
Start: 2019-03-15 | End: 2019-03-16 | Stop reason: HOSPADM

## 2019-03-14 RX ORDER — METOPROLOL SUCCINATE 25 MG/1
25 TABLET, EXTENDED RELEASE ORAL ONCE
Status: COMPLETED | OUTPATIENT
Start: 2019-03-14 | End: 2019-03-14

## 2019-03-14 RX ADMIN — LEVALBUTEROL HYDROCHLORIDE 1.25 MG: 1.25 SOLUTION, CONCENTRATE RESPIRATORY (INHALATION) at 12:54

## 2019-03-14 RX ADMIN — ERYTHROMYCIN: 5 OINTMENT OPHTHALMIC at 21:39

## 2019-03-14 RX ADMIN — HYDROCODONE BITARTRATE AND ACETAMINOPHEN 1 TABLET: 10; 325 TABLET ORAL at 21:39

## 2019-03-14 RX ADMIN — LISINOPRIL 10 MG: 10 TABLET ORAL at 09:27

## 2019-03-14 RX ADMIN — ATORVASTATIN CALCIUM 20 MG: 20 TABLET, FILM COATED ORAL at 20:38

## 2019-03-14 RX ADMIN — CEFTRIAXONE SODIUM 1 G: 1 INJECTION, POWDER, FOR SOLUTION INTRAMUSCULAR; INTRAVENOUS at 20:38

## 2019-03-14 RX ADMIN — METOPROLOL SUCCINATE 75 MG: 50 TABLET, EXTENDED RELEASE ORAL at 05:03

## 2019-03-14 RX ADMIN — ERYTHROMYCIN: 5 OINTMENT OPHTHALMIC at 15:16

## 2019-03-14 RX ADMIN — FUROSEMIDE 40 MG: 10 INJECTION, SOLUTION INTRAVENOUS at 15:47

## 2019-03-14 RX ADMIN — ASPIRIN 81 MG 81 MG: 81 TABLET ORAL at 09:28

## 2019-03-14 RX ADMIN — LEVALBUTEROL HYDROCHLORIDE 1.25 MG: 1.25 SOLUTION, CONCENTRATE RESPIRATORY (INHALATION) at 02:14

## 2019-03-14 RX ADMIN — OMEPRAZOLE 20 MG: 20 CAPSULE, DELAYED RELEASE ORAL at 09:27

## 2019-03-14 RX ADMIN — AMLODIPINE BESYLATE 5 MG: 5 TABLET ORAL at 09:28

## 2019-03-14 RX ADMIN — IPRATROPIUM BROMIDE 0.5 MG: 0.5 SOLUTION RESPIRATORY (INHALATION) at 12:54

## 2019-03-14 RX ADMIN — IPRATROPIUM BROMIDE 0.5 MG: 0.5 SOLUTION RESPIRATORY (INHALATION) at 02:14

## 2019-03-14 RX ADMIN — ERYTHROMYCIN: 5 OINTMENT OPHTHALMIC at 18:13

## 2019-03-14 RX ADMIN — METOPROLOL SUCCINATE 25 MG: 25 TABLET, EXTENDED RELEASE ORAL at 15:48

## 2019-03-14 RX ADMIN — HYDROCODONE BITARTRATE AND ACETAMINOPHEN 1 TABLET: 10; 325 TABLET ORAL at 09:27

## 2019-03-14 RX ADMIN — HYDROCODONE BITARTRATE AND ACETAMINOPHEN 1 TABLET: 10; 325 TABLET ORAL at 15:48

## 2019-03-14 RX ADMIN — CITALOPRAM HYDROBROMIDE 40 MG: 20 TABLET ORAL at 09:27

## 2019-03-14 RX ADMIN — ERYTHROMYCIN: 5 OINTMENT OPHTHALMIC at 09:00

## 2019-03-14 ASSESSMENT — ACTIVITIES OF DAILY LIVING (ADL)
ADLS_ACUITY_SCORE: 29
ADLS_ACUITY_SCORE: 31
ADLS_ACUITY_SCORE: 27
ADLS_ACUITY_SCORE: 27
ADLS_ACUITY_SCORE: 31
ADLS_ACUITY_SCORE: 27

## 2019-03-14 ASSESSMENT — MIFFLIN-ST. JEOR: SCORE: 715.24

## 2019-03-14 NOTE — PLAN OF CARE
A&OX to self, confused, agitated at times. Placed on oxymask then nasal cannula at 3 L. Tele-SR with PVC's, Complains of knee pain,PO norco administered this shift with good relief. Pills crushed with apple sauce. On nectar thick fluid, turned and repositioned with assist of 2, incontinent, 4 bowel movement this shift. On rocephin for antibiotics. Redness on the left arm that started from levaquin improved this night. Melatonin given this night.

## 2019-03-14 NOTE — PLAN OF CARE
Discharge Planner SLP   Patient plan for discharge: Patient did not state.   Current status: SLP: Patient seen for swallow treatment at noon meal. She was more alert and less confused today. She took teaspoon amounts of nectar thick liquids and tolerated without overt Sx of aspiration despite a swallow delay. She was given trials of pudding by spoon small amount 1/4-1/2 amounts. Coughed x1 with a larger bolus size. Decreased bolus coordination and AP transport with minimal oral residue. Recommend: 1. Increase to a full liquid diet nectar thick. 2. Upright, liquids by spoon 1/4-1/2, slow rate of feeding and alternate liquids/solids. Hold diet if increased coughing noted.   Barriers to return to prior living situation: None  Recommendations for discharge: TCU  Rationale for recommendations: Continue ST needs for dysphagia to return to least restrictive diet.        Entered by: Ruth Pichardo 03/14/2019 1:31 PM

## 2019-03-14 NOTE — PROGRESS NOTES
[facility]  Cardiology Progress Note    Date of Service (when I saw the patient): 03/14/2019       Assessment & Plan   Laya Lutz is a 89 year old female who was admitted on 3/11/2019.     1.  : Elevated troponin  - Peaked at 4.8, trending down, likely demand ischemia in the setting of pneumonia  - Denies chest pain  - Continue on aspirin, metoprolol, amlodipine, and statin    2.  : Acute on chronic systolic heart failure  - Echo demonstrates severely reduced LV 25-30%% from previous 35-40%, anterior septal and apical wall akinesis ( unchanged from previous study), moderate MR  - up 3 lbs overnight, overall still down 5 lbs from admission.   - Creatinine 1.35  - Continue on metoprolol, lisinopril  - Sats 97% on 1.5 l/nc    3.  : Hypertension  - Well controlled   - Sodium improving, 131  - Tele confirms sinus tachycardia with rates 123, increase Toprol to 100mg daily. Extra Toprol 25 today.   - Continue lisinopril, amlodipine and metoprolol    4.  Pneumonia  - Chest xray demonstrated increased opacity at the lateral right upper lobe, consistent with infiltrate/pneumonia  - Afebrile  - On CELESTINO Spaulding CNP  Text Page  (M-F, 7:30 am - 4:00 pm)    Interval History   More alert today, breathing better today, sats 97% on 1.5 L/NC. Denies chest pain, palpitations, PND, orthopnea, presyncope, or syncope. No LE edema, bilateral leg tenderness. 's, sinus tachycardia will increase BB.      Physical Exam   Temp: 98  F (36.7  C) Temp src: Axillary BP: (!) 131/93 Pulse: 86 Heart Rate: 113 Resp: 16 SpO2: 98 % O2 Device: Nasal cannula Oxygen Delivery: 1.5 LPM  Vitals:    03/12/19 0600 03/13/19 0409 03/14/19 0516   Weight: 47.9 kg (105 lb 8 oz) 42.2 kg (93 lb) 43.6 kg (96 lb 3.2 oz)     Vital Signs with Ranges  Temp:  [97.6  F (36.4  C)-98.7  F (37.1  C)] 98  F (36.7  C)  Pulse:  [] 86  Heart Rate:  [102-113] 113  Resp:  [16-20] 16  BP: (107-139)/() 131/93  SpO2:  [93 %-100  %] 98 %  I/O last 3 completed shifts:  In: 590 [P.O.:240; I.V.:350]  Out: 150 [Urine:150]    GEN:  In general, this is a frail elderly female in no acute distress.   HEENT:  Pupils equal, round. Sclerae nonicteric. Clear oropharynx. Mucous membranes moist.  NECK: Supple, no masses appreciated. Trachea midline. No JVD   C/V:  Regular rate and reg rhythm w/ frequent PAC, no murmur, rub or gallop. No S3 or RV heave.   RESP: Respirations are unlabored. No use of accessory muscles. Clear to auscultation bilaterally without wheezing, rales, or rhonchi.  GI: Abdomen soft, nontender, nondistended. No HSM appreciated.   EXTREM: No  LE edema. No cyanosis or clubbing.  NEURO: Alert with impaired cognition. Poor historian  PSYCH: Normal affect.  SKIN: Warm and dry. No rashes or petechiae appreciated.       Medications     - MEDICATION INSTRUCTIONS -       - MEDICATION INSTRUCTIONS -       - MEDICATION INSTRUCTIONS -       - MEDICATION INSTRUCTIONS -       - MEDICATION INSTRUCTIONS -       - MEDICATION INSTRUCTIONS -       ACE/ARB/ARNI NOT PRESCRIBED         amLODIPine  5 mg Oral Daily     aspirin  81 mg Oral Daily     atorvastatin  20 mg Oral QPM     cefTRIAXone  1 g Intravenous Q24H     citalopram  40 mg Oral Daily     erythromycin   Left Eye 4x Daily     ipratropium  0.5 mg Nebulization Q6H     levalbuterol  1.25 mg Nebulization Q6H     levofloxacin  250 mg Intravenous Q48H     lisinopril  10 mg Oral Daily     metoprolol succinate ER  75 mg Oral Daily     omeprazole  20 mg Oral QAM AC     sodium chloride (PF)  3 mL Intracatheter Q8H       Data   Reviewed       CELESTINO Brandon CNP 3/12/2019

## 2019-03-14 NOTE — PROGRESS NOTES
03/14/19 1615   Quick Adds   Type of Visit Initial Occupational Therapy Evaluation   Living Environment   Lives With alone   Living Arrangements assisted living   Transportation Anticipated family or friend will provide   Self-Care   Equipment Currently Used at Home (4ww)   Functional Level   Ambulation 1-->assistive equipment   Transferring 1-->assistive equipment   Fall history within last six months yes   Prior Functional Level Comment Pt poor historian, unsure of reliability of PLOF, pt reports I wiht ADl's dressing, toileting and uses a 4ww independently at home. unsure of IADL A.    General Information   Onset of Illness/Injury or Date of Surgery - Date 03/11/19   Referring Physician Mustapha Zuñiga MD   Patient/Family Goals Statement None stated   Additional Occupational Profile Info/Pertinent History of Current Problem Laya Lutz is a 89 year old female with PMHx of paroxysmal atrial fibrillation not on chronic anticoagulation, mild intermittent asthma, HTN, depression, neuropathy, severe osteoarthritis and scoliosis on chronic narcotics, cognitive impairment, hx of Takotsubo cardiomyopathy, and CKD III admitted on 3/11/2019 after being found down on the floor at her custodial. History limited due to cognitive impairment; collateral information from pt's daughter, Etta, and extensive chart review. Vitals currently WNL. Pt currently stable.  pt has a fall and found to have pneumonia, troponin rise per chart due to demand ischemia due to pneumonia.    Precautions/Limitations fall precautions;oxygen therapy device and L/min  (1.5 L O2)   Cognitive Status Examination   Orientation person;place  (aware of month, but not year and situaion)   Level of Consciousness alert;confused   Follows Commands (Cognition) WFL   Memory impaired   Pain Assessment   Patient Currently in Pain Yes, see Vital Sign flowsheet  (pain in B LE's to light touch, did not rate)   Range of Motion (ROM)   ROM Comment B UE AROM WFL,  B shoulders approx 90 degrees flexion.   Strength   Strength Comments B UE MMT NT    Bed Mobility Skill: Scooting/Bridging   Level of [place]: Scooting/Bridging maximum assist (25% patients effort)   Physical Assist/Nonphysical Assist: Scooting/Bridging 2 persons   Bed Mobility Skill: Sit to Supine   Level of [place]: Sit/Supine maximum assist (25% patients effort)   Physical Assist/Nonphysical Assist: Sit/Supine 2 persons   Bed Mobility Skill: Supine to Sit   Level of [place]: Supine/Sit maximum assist (25% patients effort)   Physical Assist/Nonphysical Assist: Supine/Sit 2 persons   Transfer Skill: Bed to Chair/Chair to Bed   Level of [place]: Bed to Chair unable to perform   Transfer Skill: Sit to Stand   Level of [place]: Sit/Stand minimum assist (75% patients effort)   Physical Assist/Nonphysical Assist: Sit/Stand 2 persons   Transfer Skill: Toilet Transfer   Level of [place]: Toilet unable to perform   Upper Body Dressing   Level of [place]: Dress Upper Body maximum assist (25% patients effort)   Lower Body Dressing   Level of [place]: Dress Lower Body maximum assist (25% patients effort)   Grooming   Level of [place]: Grooming minimum assist (75% patients effort)   Instrumental Activities of Daily Living (IADL)   Previous Responsibilities (unsure of reliabilty of Pt's PLOF)   Activities of Daily Living Analysis   Impairments Contributing to Impaired Activities of Daily Living balance impaired;cognition impaired;pain;strength decreased  (decreased activity tolerance)   General Therapy Interventions   Planned Therapy Interventions ADL retraining;cognition;transfer training;home program guidelines;progressive activity/exercise   Clinical Impression   Criteria for Skilled Therapeutic Interventions Met yes, treatment indicated   OT Diagnosis decreased ADL's and functional mobility   Influenced by the following impairments impaired balance, strength, dizziness, pain in B  "LEs, cognition, STM, orienation, etc.    Assessment of Occupational Performance 5 or more Performance Deficits   Identified Performance Deficits impaired I with dressing, toielting, showers, etc   Clinical Decision Making (Complexity) High complexity   Therapy Frequency 5 times/wk   Predicted Duration of Therapy Intervention (days/wks) 5 days   Anticipated Discharge Disposition Transitional Care Facility   Risks and Benefits of Treatment have been explained. Yes   Patient, Family & other staff in agreement with plan of care Yes   [facility] AM-PAC  \"6 Clicks\" Daily Activity Inpatient Short Form   1. Putting on and taking off regular lower body clothing? 2 - A Lot   2. Bathing (including washing, rinsing, drying)? 2 - A Lot   3. Toileting, which includes using toilet, bedpan or urinal? 2 - A Lot   4. Putting on and taking off regular upper body clothing? 2 - A Lot   5. Taking care of personal grooming such as brushing teeth? 3 - A Little   6. Eating meals? 3 - A Little   Daily Activity Raw Score (Score out of 24.Lower scores equate to lower levels of function) 14   Total Evaluation Time   Total Evaluation Time (Minutes) 10     "

## 2019-03-14 NOTE — PLAN OF CARE
Patient is alert and oriented to self and place. LE painful and red/dave, +2 LE. taking Norco for pain prn. Patient being turned q 2 hrs.  pt voiding- incont of urine and stool. Not much appetite- pills crushed in applesauce. On 2 L NC.

## 2019-03-14 NOTE — PROGRESS NOTES
[facility]    Hospitalist Progress Note    Assessment & Plan   Laya Lutz is a 89 year old female with PMHx of paroxysmal atrial fibrillation not on chronic anticoagulation, mild intermittent asthma, HTN, depression, neuropathy, severe osteoarthritis and scoliosis on chronic narcotics, cognitive impairment, hx of Takotsubo cardiomyopathy, and CKD III admitted on 3/11/2019 after being found down on the floor at her DEEPAK. History limited due to cognitive impairment; collateral information from pt's daughter, Etta, and extensive chart review. Vitals currently WNL. Pt currently stable.       Sepsis secondary to right upper lobe pneumonia  Acute hypoxic respiratory failure secondary to above  Mild intermittent asthma, not in acute exacerbation:  Not hypoxic on admission, but now requiring 3 LPM supplemental oxygen via NC. WBC 15.2. Chest x ray  Is not showing any pneumonia . Given dose of azithromycin and ceftriaxone in the ED. Rapid influenza negative.   -- Xoponex inhaler PRN   -- oxygen weaned , she has possible fluid overload ongoing needing oxygen   -- SLP evaluated patient    --will try another dose of lasix , sodium improving with iv lasix doses  --will obtain a CT chest due to confusion about pneumonia vs no pneumonia      UTI: UA positive for nitrites, WBC 21.   -- all 3 strains sensitive to rocephin  --stopped Levaquin 3/13 due to infiltration/rash, will continue rocephin.       SVT, resolved  NSTEMI, likely type II in the setting of above  Hx of Takotsubo cardiomyopathy (2012): EKG on admission with SVT and HR in the 130s. Pt given Metoprolol IV 5 mg X3 with resolve of rates. Hx of Takotsubo cardiomyopathy in 8/2012 in the context of UTI. At that time, EF 35-40%, large area of apical akinesis extending to involve the distal portions of the myocardial segments, mild pulmonary HTN noted--during that hospitalization, trop peaked at 3.750; no prior stress testing or angiogram. No chest pain,  dizziness, lightheadedness. BNP 60241. Trop 3.131. Does not appear overtly fluid overloaded on exam.   -- Repeat EKG with NSR, no evidence of ischemia   -- Continue PTA BB  -- appreciate cardiology consult, troponin  Trending down, stop heparin drip   -- echocardiogram showing hypokinesis in inferior septal and anterior walls   --Continue on aspirin, metoprolol, amlodipine, and statin  --will try another dose of lasix today     Unwitnessed fall: CT head and cervical spine negative for acute pathology. Denies focal pain elsewhere, no focal neuro deficits. .   -- PT, OT, SW consult  -- patient  Will need tcu on discharge      Hypochloremic hyponatremia:  -possibly hypervolemic   -will try another dose of lasix, serum osmol low        Left eye purulence: Noted dried purulence in left eye, sclera not injected. PERRLA. Hx limited at this time. Received erythromycin ophthalmic in the ED.   -- Cipro gtt BID, clean area with warm water        Paroxysmal atrial fibrillation: CHADS2-VASc 4. Not on chronic anticoagulation; takes Aspirin 81 mg po every day.   -- Continue Toprol XL, now cardiology increased it from 50  to 100 mg daily     HTN: Continue PTA Norvasc 5 mg po qd and Toprol XL 25 mg po every day. Hold PTA Lisinopril 40 mg po every day.      Depression: Continue Celexa 40 mg po every day      Severe osteoarthritis  Scoliosis   Neuropathy: Continue PTA PRN Tylenol, PRN Norco. Hold gabapentin 900 mg po qhs at this time.      Cognitive impairment:  mild cognitive impairment per daughter. Receives assistance with ADLs at [facility] and family manages medications.   -- OT to follow   -- Delirium prevention protocol ordered      CKD III: Most recent creatinine from 8/2018 was 1.33.   -- creatinine coming down   -- Avoid nephrotoxic agents   -- BMP in the AM      Abnormal thyroid studies: TSH 10.85, T4 1.01.   -- Repeat thyroid studies in 4-6 weeks.     Code Status: DNR/DNI     Disposition: Expected discharge in 2 days      Evette Vallejo MD  Text Page   (7am to 6pm)    Interval History    very difficult to get I/o , occasionally confused , still on 1.5 l oxygen , complaints of  Of shortness of breath  Off and on .    -Data reviewed today: I reviewed all new labs and imaging results over the last 24 hours.     Physical Exam   Temp: 98  F (36.7  C) Temp src: Axillary BP: 134/88 Pulse: 81 Heart Rate: 113 Resp: 16 SpO2: 93 % O2 Device: Nasal cannula Oxygen Delivery: 1.5 LPM  Vitals:    03/12/19 0600 03/13/19 0409 03/14/19 0516   Weight: 47.9 kg (105 lb 8 oz) 42.2 kg (93 lb) 43.6 kg (96 lb 3.2 oz)     Vital Signs with Ranges  Temp:  [97.6  F (36.4  C)-98.7  F (37.1  C)] 98  F (36.7  C)  Pulse:  [] 81  Heart Rate:  [102-113] 113  Resp:  [16-20] 16  BP: (107-139)/() 134/88  SpO2:  [93 %-100 %] 93 %  I/O last 3 completed shifts:  In: 590 [P.O.:240; I.V.:350]  Out: 150 [Urine:150]    Constitutional: Awake and talking but confused   Respiratory: bilateral basilar crackles+  Cardiovascular: Regular rate and rhythm, normal S1 and S2, and no murmur noted  GI: Normal bowel sounds, soft, non-distended, non-tender  Skin/Integumen: No rashes, no cyanosis, 1+ edema  Neuro : moving all 4 extremities, no focal deficit noted, memory issues+     Medications     - MEDICATION INSTRUCTIONS -       - MEDICATION INSTRUCTIONS -       - MEDICATION INSTRUCTIONS -       - MEDICATION INSTRUCTIONS -       - MEDICATION INSTRUCTIONS -       - MEDICATION INSTRUCTIONS -       ACE/ARB/ARNI NOT PRESCRIBED         amLODIPine  5 mg Oral Daily     aspirin  81 mg Oral Daily     atorvastatin  20 mg Oral QPM     cefTRIAXone  1 g Intravenous Q24H     citalopram  40 mg Oral Daily     erythromycin   Left Eye 4x Daily     ipratropium  0.5 mg Nebulization Q6H     levalbuterol  1.25 mg Nebulization Q6H     levofloxacin  250 mg Intravenous Q48H     lisinopril  10 mg Oral Daily     metoprolol succinate ER  75 mg Oral Daily     omeprazole  20 mg Oral QAM AC     sodium  chloride (PF)  3 mL Intracatheter Q8H       Data   Recent Labs   Lab 03/14/19  0547 03/13/19  2049 03/13/19  0621 03/12/19  0529 03/12/19  0120 03/12/19  0020 03/11/19  2110  03/11/19  1100   WBC  --   --   --  9.5  --   --   --   --  15.2*   HGB  --   --   --  11.9  --   --   --   --  13.6   MCV  --   --   --  89  --   --   --   --  90   PLT  --   --   --  219  --   --   --   --  247   INR  --   --   --   --   --   --   --   --  1.08   *  --  127*  --  129*  --  126*   < > 125*   POTASSIUM 3.5 4.1 3.2*  --  4.4  --  4.4   < > 5.2   CHLORIDE 96  --  91*  --  96  --  96   < > 90*   CO2 27  --  25  --  22  --  20   < > 23   BUN 29  --  28  --  30  --  30   < > 28   CR 1.35*  --  1.35*  --  1.48*  --  1.41*   < > 1.46*   ANIONGAP 8  --  11  --  11  --  10   < > 12   NANCY 8.6  --  8.6  --  8.1*  --  8.1*   < > 8.5   *  --  132*  --  90  --  96   < > 122*   ALBUMIN  --   --   --   --  2.4*  --   --   --  3.0*   PROTTOTAL  --   --   --   --  6.0*  --   --   --  7.0   BILITOTAL  --   --   --   --  0.5  --   --   --  0.9   ALKPHOS  --   --   --   --  92  --   --   --  117   ALT  --   --   --   --  24  --   --   --  29   AST  --   --   --   --  70*  --   --   --  78*   TROPI  --   --   --  2.632*  --  4.273* 4.849*   < > 3.131*    < > = values in this interval not displayed.     Recent Labs   Lab 03/14/19  0547 03/13/19  0621 03/12/19  0120 03/11/19  2110 03/11/19  1555   * 132* 90 96 106*       Imaging:   No results found for this or any previous visit (from the past 24 hour(s)).

## 2019-03-14 NOTE — PLAN OF CARE
PT: Pt declining participation at this time due to pain and dizziness after working with OT just prior to PT attempt.

## 2019-03-14 NOTE — PLAN OF CARE
Discharge Planner OT   Patient plan for discharge: N/A  Current status: Eval completed and treatment initiated. Pt lives in an DEEPAK alone, per chart, pt reports I with ADl's, ie dressing, toileting and ambulating with 4ww, pt poor historian so unsure of reliability of PLOF and A with IADl's. Pt admitted due to fall found to have pneumonia. Pt oriented to self, hospital and month, confused regarding year and situation. Pt requires MAX A X 2 for supine to sit, sits at EOB with close SBA, sit <> stand with MIN/MOD A and 4ww use pt reports feeling dizzy and need to sit down, pt fatigued and needing to lay down. See vital sign flow sheet for details with BP, BP did drop 18 MMHG for systolic. MAX A for ADL's required. Pt reports increased pain to light touch to legs, shouting out in pain.   Barriers to return to prior living situation: lives alone, decreased balance, dizziness, activity tolerance, cognition, STM, orientation, safety, strength decreased.   Recommendations for discharge: TCU, may require LTC eventually pending on progress.   Rationale for recommendations: daily therapy to increase ADL and functional mobility I and safety to PLOF.        Entered by: Rosie Bunch 03/14/2019 4:24 PM

## 2019-03-15 ENCOUNTER — APPOINTMENT (OUTPATIENT)
Dept: OCCUPATIONAL THERAPY | Facility: CLINIC | Age: 84
DRG: 871 | End: 2019-03-15
Payer: MEDICARE

## 2019-03-15 ENCOUNTER — APPOINTMENT (OUTPATIENT)
Dept: SPEECH THERAPY | Facility: CLINIC | Age: 84
DRG: 871 | End: 2019-03-15
Payer: MEDICARE

## 2019-03-15 LAB
ANION GAP SERPL CALCULATED.3IONS-SCNC: 8 MMOL/L (ref 3–14)
BUN SERPL-MCNC: 42 MG/DL (ref 7–30)
CALCIUM SERPL-MCNC: 8.3 MG/DL (ref 8.5–10.1)
CHLORIDE SERPL-SCNC: 95 MMOL/L (ref 94–109)
CO2 SERPL-SCNC: 28 MMOL/L (ref 20–32)
CREAT SERPL-MCNC: 1.52 MG/DL (ref 0.52–1.04)
GFR SERPL CREATININE-BSD FRML MDRD: 29 ML/MIN/{1.73_M2}
GLUCOSE SERPL-MCNC: 115 MG/DL (ref 70–99)
POTASSIUM SERPL-SCNC: 3.3 MMOL/L (ref 3.4–5.3)
POTASSIUM SERPL-SCNC: 4.2 MMOL/L (ref 3.4–5.3)
SODIUM SERPL-SCNC: 131 MMOL/L (ref 133–144)

## 2019-03-15 PROCEDURE — A9270 NON-COVERED ITEM OR SERVICE: HCPCS | Mod: GY | Performed by: PHYSICIAN ASSISTANT

## 2019-03-15 PROCEDURE — 84132 ASSAY OF SERUM POTASSIUM: CPT | Performed by: INTERNAL MEDICINE

## 2019-03-15 PROCEDURE — 25000132 ZZH RX MED GY IP 250 OP 250 PS 637: Mod: GY | Performed by: PHYSICIAN ASSISTANT

## 2019-03-15 PROCEDURE — 25000132 ZZH RX MED GY IP 250 OP 250 PS 637: Mod: GY | Performed by: INTERNAL MEDICINE

## 2019-03-15 PROCEDURE — 25000132 ZZH RX MED GY IP 250 OP 250 PS 637: Mod: GY | Performed by: NURSE PRACTITIONER

## 2019-03-15 PROCEDURE — 36415 COLL VENOUS BLD VENIPUNCTURE: CPT | Performed by: INTERNAL MEDICINE

## 2019-03-15 PROCEDURE — A9270 NON-COVERED ITEM OR SERVICE: HCPCS | Mod: GY | Performed by: NURSE PRACTITIONER

## 2019-03-15 PROCEDURE — 40000275 ZZH STATISTIC RCP TIME EA 10 MIN

## 2019-03-15 PROCEDURE — 97535 SELF CARE MNGMENT TRAINING: CPT | Mod: GO

## 2019-03-15 PROCEDURE — 94640 AIRWAY INHALATION TREATMENT: CPT

## 2019-03-15 PROCEDURE — 25000128 H RX IP 250 OP 636: Performed by: HOSPITALIST

## 2019-03-15 PROCEDURE — 25000125 ZZHC RX 250: Performed by: NURSE PRACTITIONER

## 2019-03-15 PROCEDURE — A9270 NON-COVERED ITEM OR SERVICE: HCPCS | Mod: GY | Performed by: INTERNAL MEDICINE

## 2019-03-15 PROCEDURE — 25000125 ZZHC RX 250: Performed by: PHYSICIAN ASSISTANT

## 2019-03-15 PROCEDURE — 92526 ORAL FUNCTION THERAPY: CPT | Mod: GN | Performed by: SPEECH-LANGUAGE PATHOLOGIST

## 2019-03-15 PROCEDURE — 21000001 ZZH R&B HEART CARE

## 2019-03-15 PROCEDURE — 80048 BASIC METABOLIC PNL TOTAL CA: CPT | Performed by: INTERNAL MEDICINE

## 2019-03-15 PROCEDURE — 99232 SBSQ HOSP IP/OBS MODERATE 35: CPT | Performed by: INTERNAL MEDICINE

## 2019-03-15 PROCEDURE — 94640 AIRWAY INHALATION TREATMENT: CPT | Mod: 76

## 2019-03-15 RX ADMIN — LEVALBUTEROL HYDROCHLORIDE 1.25 MG: 1.25 SOLUTION, CONCENTRATE RESPIRATORY (INHALATION) at 08:45

## 2019-03-15 RX ADMIN — IPRATROPIUM BROMIDE 0.5 MG: 0.5 SOLUTION RESPIRATORY (INHALATION) at 20:12

## 2019-03-15 RX ADMIN — LEVALBUTEROL HYDROCHLORIDE 1.25 MG: 1.25 SOLUTION, CONCENTRATE RESPIRATORY (INHALATION) at 20:12

## 2019-03-15 RX ADMIN — CEFTRIAXONE SODIUM 1 G: 1 INJECTION, POWDER, FOR SOLUTION INTRAMUSCULAR; INTRAVENOUS at 22:02

## 2019-03-15 RX ADMIN — ATORVASTATIN CALCIUM 20 MG: 20 TABLET, FILM COATED ORAL at 22:02

## 2019-03-15 RX ADMIN — IPRATROPIUM BROMIDE 0.5 MG: 0.5 SOLUTION RESPIRATORY (INHALATION) at 13:40

## 2019-03-15 RX ADMIN — LEVALBUTEROL HYDROCHLORIDE 1.25 MG: 1.25 SOLUTION, CONCENTRATE RESPIRATORY (INHALATION) at 13:40

## 2019-03-15 RX ADMIN — LEVALBUTEROL HYDROCHLORIDE 1.25 MG: 1.25 SOLUTION, CONCENTRATE RESPIRATORY (INHALATION) at 00:03

## 2019-03-15 RX ADMIN — IPRATROPIUM BROMIDE 0.5 MG: 0.5 SOLUTION RESPIRATORY (INHALATION) at 00:03

## 2019-03-15 RX ADMIN — ACETAMINOPHEN 650 MG: 325 TABLET, FILM COATED ORAL at 22:02

## 2019-03-15 RX ADMIN — CITALOPRAM HYDROBROMIDE 40 MG: 20 TABLET ORAL at 09:20

## 2019-03-15 RX ADMIN — IPRATROPIUM BROMIDE 0.5 MG: 0.5 SOLUTION RESPIRATORY (INHALATION) at 08:45

## 2019-03-15 RX ADMIN — ASPIRIN 81 MG 81 MG: 81 TABLET ORAL at 09:21

## 2019-03-15 RX ADMIN — OMEPRAZOLE 20 MG: 20 CAPSULE, DELAYED RELEASE ORAL at 09:21

## 2019-03-15 RX ADMIN — POTASSIUM CHLORIDE 40 MEQ: 1500 TABLET, EXTENDED RELEASE ORAL at 09:21

## 2019-03-15 RX ADMIN — ACETAMINOPHEN 650 MG: 325 TABLET, FILM COATED ORAL at 16:04

## 2019-03-15 RX ADMIN — LISINOPRIL 10 MG: 10 TABLET ORAL at 09:21

## 2019-03-15 RX ADMIN — ERYTHROMYCIN: 5 OINTMENT OPHTHALMIC at 22:07

## 2019-03-15 RX ADMIN — METOPROLOL SUCCINATE 100 MG: 100 TABLET, EXTENDED RELEASE ORAL at 06:37

## 2019-03-15 RX ADMIN — ACETAMINOPHEN 650 MG: 325 TABLET, FILM COATED ORAL at 06:37

## 2019-03-15 RX ADMIN — AMLODIPINE BESYLATE 5 MG: 5 TABLET ORAL at 09:21

## 2019-03-15 RX ADMIN — POTASSIUM CHLORIDE 20 MEQ: 1500 TABLET, EXTENDED RELEASE ORAL at 11:33

## 2019-03-15 RX ADMIN — ERYTHROMYCIN: 5 OINTMENT OPHTHALMIC at 18:14

## 2019-03-15 RX ADMIN — ERYTHROMYCIN: 5 OINTMENT OPHTHALMIC at 09:24

## 2019-03-15 ASSESSMENT — MIFFLIN-ST. JEOR: SCORE: 695.74

## 2019-03-15 ASSESSMENT — ACTIVITIES OF DAILY LIVING (ADL)
ADLS_ACUITY_SCORE: 29

## 2019-03-15 NOTE — PLAN OF CARE
Patient A&O to self only, confused and calm, Denies chest pain/tightness/pressure but has chronic neck and legs pain - Norco 1 given at 2139 and on tele SR/St and VSS,  Lungs are diminished and on 1.5 L NC, up with heavy assist of 2 and RW and GB, on full nectar thick diet, skin is fragile and bruised, incontinent of urine and stool,  IV is in R arm and SL. Plan to continue Zosyn and monitor HR.

## 2019-03-15 NOTE — PLAN OF CARE
Discharge Planner OT   Patient plan for discharge: None stated   Current status:  Pt went from supine to sit EOB with moderate assist of 2 and extra time. Pt went from sit<>Stand with minimum assist of 2 and completed a stand pivot transfer to the bedside chair with moderate assist of 2. During transfer pt demonstrated impulsiveness, decreased safety awareness, and unsteadiness. Pt easily distracted requiring >3 verbal cues to redirect attention to task at hand.   Barriers to return to prior living situation: lives alone, decreased balance, decreased activity tolerance for I/ADLs, cognition, decreased safety awareness, strength decreased.   Recommendations for discharge: TCU  Rationale for recommendations: Skilled OT to address independence and safety in I/ADLs.        Entered by: Brandi Moore 03/15/2019 10:00 AM

## 2019-03-15 NOTE — PLAN OF CARE
Pt alert, oriented to self & place, occasionally situation. VSS with 1L o2 via nasal cannula. Tele sinus dysrhythmia. 's. PRN Tylenol for c/o leg pain. Lung sounds diminished in bilateral bases. BLE reddened, painful to touch. Receiving IV antibiotics for +UA. Pills in applesauce, nectar thick liquids. Incontinent bowel & bladder. Awaiting chest CT results.

## 2019-03-15 NOTE — CONSULTS
Care Transition Initial Assessment -      Met with: Family  (daughter Etta)  Principal Problem:    Pneumonia  Active Problems:    Sepsis (H)    ACS (acute coronary syndrome) (H)       DATA  Lives With: facility resident, alone(Hiren Murphy)   Living Arrangements: assisted living  Quality of Family Relationships: involved, supportive  Description of Support System: Supportive, Involved  Who is your support system?: Children  Support Assessment: Adequate family and caregiver support.   Identified issues/concerns regarding health management:       Quality of Family Relationships: involved, supportive  Transportation Anticipated: family or friend will provide    Per social work consult for discharge planning.  Patient was admitted on 3-11-19 with pneumonia.  The tentative date of discharge is 3-16-19.  Reviewed chart and call placed to patient's daughter Etta to discuss discharge plans as patient is confused.  Per patient's daughter's report, patient lives alone in an apartment at [facility] on the [facility].  Patient uses a 4 wheeled walker at baseline.  Reviewed the therapy discharge recommendations of tcu placement on discharge and patient's daughter is in agreement.  Patient's daughter would like a referral sent to [facility].  Explained there is a $36 per day amenity fee and patient's daughter is in agreement.  Patient's daughter is asking for transport to be arranged when discharge is known.  Explained that this would be private pay and patient's daughter is in agreement.  Referral sent, via discharge on the double, to check bed availability at [facility].  Received a call back from [facility].  They can accept patient tomorrow after 13:00.  Call placed to [facility] to arrange for wheelchair transport at 13:00 tomorrow.  Call placed to update [facility].  Call placed and message left for patient's daughter to update  her.    ASSESSMENT  Cognitive Status:  Did not meet with patient, spoke with her daughter on the phone  Concerns to be addressed: discharge planning, tcu placement on discharge.     PLAN  Financial costs for the patient includes private room amenity fees, transportation costs.  Patient given options and choices for discharge TCU choices.  Patient/family is agreeable to the plan?  Yes  Patient Goals and Preferences: TCU on discharge.  Patient anticipates discharging to:  [facility].    Will continue to follow and assist with a safe discharge plan.    CLAY Tom, [facility]  [phone]

## 2019-03-15 NOTE — PLAN OF CARE
Discharge Planner SLP   Patient plan for discharge: Not stated.   Current status: Patient seen for swallowing treatment. Patient reporting discomfort with both legs, lower denture.  Oral cares performed and provided blankets and foot rest, positioning.  Patient tolerating nectar thick liquids by spoon and cup.  Recommend continue nectar thick full liquid diet at this time as swallow response is delayed and patient intermittently unable to wear dentures.   Barriers to return to prior living situation: Weakness, confusion.   Recommendations for discharge: TCU  Rationale for recommendations: Recommend SLP follow up at discharge for safe return to least restrictive diet.       Entered by: Miya Shay 03/15/2019 12:01 PM

## 2019-03-15 NOTE — PLAN OF CARE
PT - Attempted to see pt for PT eval this morning, pt up in chair, many complaints about dentures feeling sore, needing mouth care, pt perseverating on this, unable to focus on PT session and declined in favor of participating in speech session. Tried back a little later and pt was back in bed, flatly refused PT/mobility. Will try back tomorrow.

## 2019-03-16 ENCOUNTER — APPOINTMENT (OUTPATIENT)
Dept: PHYSICAL THERAPY | Facility: CLINIC | Age: 84
DRG: 871 | End: 2019-03-16
Attending: INTERNAL MEDICINE
Payer: MEDICARE

## 2019-03-16 VITALS
HEIGHT: 57 IN | HEART RATE: 87 BPM | SYSTOLIC BLOOD PRESSURE: 145 MMHG | BODY MASS INDEX: 20 KG/M2 | OXYGEN SATURATION: 97 % | WEIGHT: 92.7 LBS | TEMPERATURE: 97.8 F | DIASTOLIC BLOOD PRESSURE: 95 MMHG | RESPIRATION RATE: 18 BRPM

## 2019-03-16 LAB
ANION GAP SERPL CALCULATED.3IONS-SCNC: 6 MMOL/L (ref 3–14)
BUN SERPL-MCNC: 44 MG/DL (ref 7–30)
CALCIUM SERPL-MCNC: 8.2 MG/DL (ref 8.5–10.1)
CHLORIDE SERPL-SCNC: 98 MMOL/L (ref 94–109)
CO2 SERPL-SCNC: 28 MMOL/L (ref 20–32)
CREAT SERPL-MCNC: 1.5 MG/DL (ref 0.52–1.04)
ERYTHROCYTE [DISTWIDTH] IN BLOOD BY AUTOMATED COUNT: 14 % (ref 10–15)
GFR SERPL CREATININE-BSD FRML MDRD: 30 ML/MIN/{1.73_M2}
GLUCOSE SERPL-MCNC: 112 MG/DL (ref 70–99)
HCT VFR BLD AUTO: 39.1 % (ref 35–47)
HGB BLD-MCNC: 12.7 G/DL (ref 11.7–15.7)
MCH RBC QN AUTO: 29.8 PG (ref 26.5–33)
MCHC RBC AUTO-ENTMCNC: 32.5 G/DL (ref 31.5–36.5)
MCV RBC AUTO: 92 FL (ref 78–100)
PLATELET # BLD AUTO: 241 10E9/L (ref 150–450)
POTASSIUM SERPL-SCNC: 3.9 MMOL/L (ref 3.4–5.3)
RBC # BLD AUTO: 4.26 10E12/L (ref 3.8–5.2)
SODIUM SERPL-SCNC: 132 MMOL/L (ref 133–144)
WBC # BLD AUTO: 9.5 10E9/L (ref 4–11)

## 2019-03-16 PROCEDURE — 97530 THERAPEUTIC ACTIVITIES: CPT | Mod: GP

## 2019-03-16 PROCEDURE — 97161 PT EVAL LOW COMPLEX 20 MIN: CPT | Mod: GP

## 2019-03-16 PROCEDURE — 25000132 ZZH RX MED GY IP 250 OP 250 PS 637: Mod: GY | Performed by: INTERNAL MEDICINE

## 2019-03-16 PROCEDURE — 25000132 ZZH RX MED GY IP 250 OP 250 PS 637: Mod: GY | Performed by: PHYSICIAN ASSISTANT

## 2019-03-16 PROCEDURE — 40000275 ZZH STATISTIC RCP TIME EA 10 MIN

## 2019-03-16 PROCEDURE — A9270 NON-COVERED ITEM OR SERVICE: HCPCS | Mod: GY | Performed by: NURSE PRACTITIONER

## 2019-03-16 PROCEDURE — A9270 NON-COVERED ITEM OR SERVICE: HCPCS | Performed by: PHYSICIAN ASSISTANT

## 2019-03-16 PROCEDURE — 94640 AIRWAY INHALATION TREATMENT: CPT | Mod: 76

## 2019-03-16 PROCEDURE — 36415 COLL VENOUS BLD VENIPUNCTURE: CPT | Performed by: INTERNAL MEDICINE

## 2019-03-16 PROCEDURE — 85027 COMPLETE CBC AUTOMATED: CPT | Performed by: INTERNAL MEDICINE

## 2019-03-16 PROCEDURE — 80048 BASIC METABOLIC PNL TOTAL CA: CPT | Performed by: INTERNAL MEDICINE

## 2019-03-16 PROCEDURE — 25000132 ZZH RX MED GY IP 250 OP 250 PS 637: Mod: GY | Performed by: NURSE PRACTITIONER

## 2019-03-16 PROCEDURE — A9270 NON-COVERED ITEM OR SERVICE: HCPCS | Mod: GY | Performed by: PHYSICIAN ASSISTANT

## 2019-03-16 PROCEDURE — 99239 HOSP IP/OBS DSCHRG MGMT >30: CPT | Performed by: INTERNAL MEDICINE

## 2019-03-16 PROCEDURE — 25000128 H RX IP 250 OP 636: Performed by: INTERNAL MEDICINE

## 2019-03-16 PROCEDURE — 90662 IIV NO PRSV INCREASED AG IM: CPT | Performed by: INTERNAL MEDICINE

## 2019-03-16 PROCEDURE — 25000125 ZZHC RX 250: Performed by: NURSE PRACTITIONER

## 2019-03-16 PROCEDURE — 25000125 ZZHC RX 250: Performed by: PHYSICIAN ASSISTANT

## 2019-03-16 PROCEDURE — A9270 NON-COVERED ITEM OR SERVICE: HCPCS | Mod: GY | Performed by: INTERNAL MEDICINE

## 2019-03-16 PROCEDURE — 97110 THERAPEUTIC EXERCISES: CPT | Mod: GP

## 2019-03-16 PROCEDURE — 94640 AIRWAY INHALATION TREATMENT: CPT

## 2019-03-16 RX ORDER — ERYTHROMYCIN 5 MG/G
OINTMENT OPHTHALMIC 4 TIMES DAILY
Status: ON HOLD | DISCHARGE
Start: 2019-03-16 | End: 2019-03-20

## 2019-03-16 RX ORDER — ASPIRIN 81 MG/1
81 TABLET, CHEWABLE ORAL DAILY
DISCHARGE
Start: 2019-03-16

## 2019-03-16 RX ORDER — ATORVASTATIN CALCIUM 20 MG/1
20 TABLET, FILM COATED ORAL EVERY EVENING
DISCHARGE
Start: 2019-03-16

## 2019-03-16 RX ORDER — HYDROCODONE BITARTRATE AND ACETAMINOPHEN 10; 325 MG/1; MG/1
1 TABLET ORAL 3 TIMES DAILY PRN
Qty: 15 TABLET | Refills: 0 | Status: ON HOLD | OUTPATIENT
Start: 2019-03-16 | End: 2019-03-20

## 2019-03-16 RX ORDER — METOPROLOL SUCCINATE 100 MG/1
100 TABLET, EXTENDED RELEASE ORAL DAILY
DISCHARGE
Start: 2019-03-17

## 2019-03-16 RX ORDER — LISINOPRIL 10 MG/1
10 TABLET ORAL DAILY
DISCHARGE
Start: 2019-03-16

## 2019-03-16 RX ORDER — CEFDINIR 300 MG/1
300 CAPSULE ORAL DAILY
Status: ON HOLD | DISCHARGE
Start: 2019-03-16 | End: 2019-03-20

## 2019-03-16 RX ADMIN — INFLUENZA A VIRUS A/MICHIGAN/45/2015 X-275 (H1N1) ANTIGEN (FORMALDEHYDE INACTIVATED), INFLUENZA A VIRUS A/SINGAPORE/INFIMH-16-0019/2016 IVR-186 (H3N2) ANTIGEN (FORMALDEHYDE INACTIVATED), AND INFLUENZA B VIRUS B/MARYLAND/15/2016 BX-69A (A B/COLORADO/6/2017-LIKE VIRUS) ANTIGEN (FORMALDEHYDE INACTIVATED) 0.5 ML: 60; 60; 60 INJECTION, SUSPENSION INTRAMUSCULAR at 12:10

## 2019-03-16 RX ADMIN — LEVALBUTEROL HYDROCHLORIDE 1.25 MG: 1.25 SOLUTION, CONCENTRATE RESPIRATORY (INHALATION) at 12:08

## 2019-03-16 RX ADMIN — AMLODIPINE BESYLATE 5 MG: 5 TABLET ORAL at 09:50

## 2019-03-16 RX ADMIN — IPRATROPIUM BROMIDE 0.5 MG: 0.5 SOLUTION RESPIRATORY (INHALATION) at 12:08

## 2019-03-16 RX ADMIN — METOPROLOL SUCCINATE 100 MG: 100 TABLET, EXTENDED RELEASE ORAL at 06:48

## 2019-03-16 RX ADMIN — IPRATROPIUM BROMIDE 0.5 MG: 0.5 SOLUTION RESPIRATORY (INHALATION) at 08:02

## 2019-03-16 RX ADMIN — ASPIRIN 81 MG 81 MG: 81 TABLET ORAL at 09:50

## 2019-03-16 RX ADMIN — CITALOPRAM HYDROBROMIDE 40 MG: 20 TABLET ORAL at 09:50

## 2019-03-16 RX ADMIN — LISINOPRIL 10 MG: 10 TABLET ORAL at 09:50

## 2019-03-16 RX ADMIN — IPRATROPIUM BROMIDE 0.5 MG: 0.5 SOLUTION RESPIRATORY (INHALATION) at 00:22

## 2019-03-16 RX ADMIN — LEVALBUTEROL HYDROCHLORIDE 1.25 MG: 1.25 SOLUTION, CONCENTRATE RESPIRATORY (INHALATION) at 08:02

## 2019-03-16 RX ADMIN — HYDROCODONE BITARTRATE AND ACETAMINOPHEN 1 TABLET: 10; 325 TABLET ORAL at 04:25

## 2019-03-16 RX ADMIN — LEVALBUTEROL HYDROCHLORIDE 1.25 MG: 1.25 SOLUTION, CONCENTRATE RESPIRATORY (INHALATION) at 00:22

## 2019-03-16 RX ADMIN — OMEPRAZOLE 20 MG: 20 CAPSULE, DELAYED RELEASE ORAL at 09:50

## 2019-03-16 ASSESSMENT — MIFFLIN-ST. JEOR: SCORE: 699.36

## 2019-03-16 ASSESSMENT — ACTIVITIES OF DAILY LIVING (ADL)
ADLS_ACUITY_SCORE: 28
ADLS_ACUITY_SCORE: 29
ADLS_ACUITY_SCORE: 29
ADLS_ACUITY_SCORE: 28

## 2019-03-16 NOTE — PLAN OF CARE
Discharge Planner PT   Patient plan for discharge: None stated  Current status: Pt adm on 3/11/19 with pneumonia. At baseline, pt lives in [facility] apartment, ambulates with 4WW. PT evaluation completed and treatment initiated. Pt is mod assist for bed mobility. Pt max assist of 1 for sit to stand and for stand pivot transfer bed to chair, unable to ambulate at this time.  Barriers to return to prior living situation: Current level of assist, decreased strength, decreased activity tolerance, decreased balance, decreased safety  Recommendations for discharge: TCU  Rationale for recommendations: Pt is below baseline level of function, is not safe to perform mobility tasks without significant assistance. Recommend cont PT intervention at TCU to maximize strength, balance, activity tolerance, and independence with functional mobility.       Entered by: Yomaira Law 03/16/2019 12:28 PM      Physical Therapy Discharge Summary    Reason for therapy discharge:    Discharged to transitional care facility.    Progress towards therapy goal(s). See goals on Care Plan in [facility] electronic health record for goal details.  Goals not met.  Barriers to achieving goals:   discharge from facility and discharge on same date as initial evaluation.    Therapy recommendation(s):    Continued therapy is recommended.  Rationale/Recommendations:  cont PT at TCU to further maximize independence, strength, and safety.

## 2019-03-16 NOTE — PLAN OF CARE
VSS. Monitor remains Sinus rhythm. Pt. Discharged to [facility] per order. Transportation per [facility]   Wheelchair. Belongings sent with pt.

## 2019-03-16 NOTE — PLAN OF CARE
OT: Pt discharging to TCU shortly. Defer remainder of OT intervention to TCU.    Occupational Therapy Discharge Summary    Reason for therapy discharge:    Discharged to transitional care facility.    Progress towards therapy goal(s). See goals on Care Plan in [facility] electronic health record for goal details.  Goals not met.  Barriers to achieving goals:   limited tolerance for therapy and discharge from facility.    Therapy recommendation(s):    Continued therapy is recommended.  Rationale/Recommendations:  Pt would benefit from TCU stay with ongoing OT intervention to ensure full and safe return to PLOF with ADLs. .

## 2019-03-16 NOTE — PROGRESS NOTES
"   03/16/19 0922   Quick Adds   Type of Visit Initial PT Evaluation   Living Environment   Lives With facility resident;alone   Living Arrangements assisted living   Home Accessibility no concerns   Living Environment Comment Pt lives in assisted living, normally ambulates with 4WW   Self-Care   Usual Activity Tolerance moderate   Current Activity Tolerance poor   Regular Exercise No   Equipment Currently Used at Home walker, rolling   Activity/Exercise/Self-Care Comment Pt uses 4WW at baseline, independent with ADLs   Functional Level Prior   Ambulation 1-->assistive equipment   Transferring 1-->assistive equipment   Fall history within last six months yes   Prior Functional Level Comment Pt a poor historian, unable to reliably state prior level of function, some information obtained through chart review   General Information   Onset of Illness/Injury or Date of Surgery - Date 03/11/19   Referring Physician Mustapha Zuñiga MD   Patient/Family Goals Statement None stated   Pertinent History of Current Problem (include personal factors and/or comorbidities that impact the POC) Pt adm on 3/11/19 after being found on floor of [facility] apartment. Work up revealed pneumonia. Pt with PMH including paroxysmal a-fib, asthma, HTN, depression, neuropathy, severe OA, scoliosis, cognitive impairment, Takotsubo CM, CKD III   Precautions/Limitations fall precautions   Cognitive Status Examination   Orientation person   Level of Consciousness alert   Cognitive Comment Pt confused throughout, often crying out \"What did I do?\" \"This is miserable.\"   Pain Assessment   Patient Currently in Pain (LE pain, does not rate)   Integumentary/Edema   Integumentary/Edema Comments B LEs very sensitive to touch, red   Posture    Posture Forward head position;Protracted shoulders;Scoliosis   Range of Motion (ROM)   ROM Comment B LE ROM WFL   Strength   Strength Comments B LE strength grossly 3/5, unable to formally assess   Bed Mobility   Bed " "Mobility Comments Mod assist of 1   Transfer Skills   Transfer Comments Max assist of 1   Gait   Gait Comments Unable to assist at this time   Balance   Balance Comments Poor- needing assist for both sitting and standing balance   Sensory Examination   Sensory Perception Comments Has baseline neuropathy, unable to assess LE sensation as pt cries out in pain whenever LEs touched   General Therapy Interventions   Planned Therapy Interventions balance training;bed mobility training;gait training;strengthening;transfer training;progressive activity/exercise   Clinical Impression   Criteria for Skilled Therapeutic Intervention yes, treatment indicated   PT Diagnosis Impaired functional independence   Influenced by the following impairments Decreased strength, decreased balance, decreased activity tolerance, increased pain   Functional limitations due to impairments Decreased ability to safely participate in daily tasks   Clinical Presentation Stable/Uncomplicated   Clinical Presentation Rationale Current presentation, [facility]   Clinical Decision Making (Complexity) Low complexity   Therapy Frequency` 5 times/week   Predicted Duration of Therapy Intervention (days/wks) 3 days   Anticipated Discharge Disposition Transitional Care Facility   Risk & Benefits of therapy have been explained Yes   Patient, Family & other staff in agreement with plan of care Yes   Clinical Impression Comments Pt is below baseline level of function, recommend TCU to maximize independence and safety prior to return to [facility].   [facility] Sk[employer]-PAC TM \"6 Clicks\"   2016, Trustees of [facility], under license to [employer].  All rights reserved.   6 Clicks Short Forms Basic Mobility Inpatient Short Form   [facility] AM-PAC  \"6 Clicks\" V.2 Basic Mobility Inpatient Short Form   1. Turning from your back to your side while in a flat bed without using bedrails? 2 - A Lot   2. Moving from lying on your back to sitting on the side of a flat " bed without using bedrails? 2 - A Lot   3. Moving to and from a bed to a chair (including a wheelchair)? 2 - A Lot   4. Standing up from a chair using your arms (e.g., wheelchair, or bedside chair)? 2 - A Lot   5. To walk in hospital room? 1 - Total   6. Climbing 3-5 steps with a railing? 1 - Total   Basic Mobility Raw Score (Score out of 24.Lower scores equate to lower levels of function) 10   Total Evaluation Time   Total Evaluation Time (Minutes) 10

## 2019-03-16 NOTE — PLAN OF CARE
Pt stable over night, VSS, Pt alert to self  and place at times, mentation improving by AM.  Pt is aware that she is in the hospital by am.  Pt is incontinent of bowel and bladder.  Pt turned every 2 hours, skin is fragile but intact.  PRN meds given for discomfort with success.  Pt tolerating dysphagia diet, will continue to monitor.

## 2019-03-16 NOTE — DISCHARGE SUMMARY
[facility]    Discharge Summary  Hospitalist    Date of Admission:  3/11/2019  Date of Discharge:  3/16/2019  Discharging Provider: Mahnaz Vang    Discharge Diagnoses   Sepsis secondary to RUL CAP, organism unspecified  Acute hypoxic respiratory failure secondary to RUL CAP  Mild intermittent asthma, not in acute exacerbation:    Uncomplicated UTI dt klebsiella pneumonia:  SVT, resolved  NSTEMI, likely demand ischemia in the setting of above  Hx of Takotsubo cardiomyopathy (2012)  Hypertension  Paroxysmal Afib:  Hypervolemic Hyponatremia: Improved  Stage III CKD  Unwitnessed fall  Left eye bacterial conjunctivitis  Depression  Severe osteoarthritis  Scoliosis   Neuropathy  Cognitive impairment  Abnormal thyroid studies    History of Present Illness   Laya Lutz is a 89 year old female with PMHx of paroxysmal atrial fibrillation not on chronic anticoagulation, mild intermittent asthma, HTN, depression, neuropathy, severe osteoarthritis and scoliosis on chronic narcotics, cognitive impairment, hx of Takotsubo cardiomyopathy, and CKD stage III was admitted on 3/11/2019 after being found down on the floor at her DEEPAK. Subsequent workup was revealed a RLL pneumonia and UTI.     Hospital Course   Laya Lutz was admitted on 3/11/2019.  The following problems were addressed during her hospitalization:     Sepsis secondary to RUL CAP, organism unspecified  Acute hypoxic respiratory failure secondary to RUL CAP  Mild intermittent asthma, not in acute exacerbation:    O2 sats initial stable on RA but went on to require upwards of 3L NC. WBC 15.2. Rapid influenza neg. CXR was neg. Placed on scheduled neb treatments given hx of asthma.  CT chest on 3/15 obtained for evaluation of ongoing shortness of breath and showed a possible RLL infiltrate vs atelectasis. Maintained on Rocephin this stay given urine culture as below. Gently diuresed with IV Lasix during stay. Was weaned off O2 prior to  discharge. Will complete course of treatment with Omnicef as below.     Uncomplicated UTI dt klebsiella pneumonia:  UA was notably abnl. Urine culture ultimately grew 3 strains of klebsiella pneumoniae (all 50-100k). Initially treated with Levaquin but developed rash and was then changed to Rocephin. Discharged with plans to complete 3 days of additional treatment with Omnicef.      SVT, resolved  NSTEMI, likely demand ischemia in the setting of above  Hx of Takotsubo cardiomyopathy (2012)  Hypertension  EKG on admission showed SVT with HRs into 130s. Given IV Lopressor x3 with noted improvement. Has hx of Takotsubo cardiomyopathy in 8/2012 in the context of UTI. At that time, EF 35-40% with a large area of apical akinesis extending to involve the distal portions of the myocardial segments, mild pulmonary HTN were noted. No additional testing (stress test, angiogram) were done during that stay. On admission, she was asymptomatic -- no c/o chest pain, dizziness or lightheadedness. Trops peaked at 3.13 this stay. Was placed on heparin gtt. Repeat EKG confirmed NSR and no findings of ischemia. ProBNP 39k but did not appear overtly volume overloaded. Seen by cardiology this stay. Echocardiogram repeated -- showed EF 25-30% with ongoing hypokinesis. Cardiology recommended medical management. Aspirin added. Metoprolol XL dose increased from 50mg daily to 100mg daily, lisinopril dose decreased from 40mg daily to 10mg daily given underlying renal dysfunction. Given few doses of Lasix during stay but no need for ongoing diuresis at the time of discharge. Continued on statin.     Paroxysmal Afib:  CHADS2 VASc score of 4. Not on chronic anticoagulation. On metoprolol for rate control.     Hypervolemic Hyponatremia:  Na 126 on admission. Given few doses of LAsix during stay. Na improved to 132 on the day of discharge.     Stage III CKD:   Br 1.3 in 8/2018. Cr 1.4 on admission, variable this stay (1.3 - 1.5). Was 1.5 on day of  discharge. Lisinopril dose was decreased this stay. Was not discharged on diuretics. Recommend BMP on 3/18 for interval followup.      Unwitnessed fall:   Events surrounding fall were limited dt patient's underlying cognitive impairment. CT head and cervical spine were negative for acute pathology. Denied focal pain elsewhere, no focal neuro deficits on exam. Per PT/OT recommendations, was discharged to TCU.     Left eye bacterial conjunctivitis:   Noted dried purulence in left eye, sclera not injected. PERRLA. Received erythromycin ophthalmic in the ED.   Discharged with Cipro gtts BID. Recommend warm water compresses    Depression:  Chronic and stable on Celexa.     Severe osteoarthritis  Scoliosis   Neuropathy:   Pain managed with prns, including Tylenol and Norco. HS gabapentin held this stay dt fall, poor renal function.      Cognitive impairment:    Mild cognitive impairment per daughter. Prior to admission had been receiving assistance with ADLs [facility] and family manages medications. Discharged to TCU per PT/OT recs this stay.      Abnormal thyroid studies:   TSH 10.85, T4 1.01 this stay in setting of acute illness. Should have repeat thyroid studies in 4-6 weeks    Mahnaz Vang    Significant Results and Procedures   3/12/19 Echocardiogram:  Interpretation Summary     Unable to use contrast due to no IV access.  Technically difficult imaging.  Left ventricular systolic function is severely reduced.  The visual ejection fraction is estimated at 25-30%.  There is anterior, septal, and apical wall akinesis.  The left ventricle is moderately dilated.  The left atrium is mildly dilated.  There is moderate (2+) mitral regurgitation.     Direct comparisons are difficult as imaging is technically very challenging  and the standard acoustic windows cannot be easily obtained, however since the  last study 8/1/2012, , there appears to be a generalized decline in estimated  LV systolic performacne ( LVEF had  "been estimated 35 to 40% now 25 to 30% at  best) No change in the pre-existing anteroapical regional wall motion  abnormality.    Pending Results   These results will be followed up by PCP  Unresulted Labs Ordered in the Past 30 Days of this Admission     Date and Time Order Name Status Description    3/11/2019 1248 Blood culture Preliminary     3/11/2019 1248 Blood culture Preliminary     3/11/2019 1100 T4 free In process           Code Status   DNR / DNI       Primary Care Physician   Dayton Huston    Physical Exam   Temp: 97.8  F (36.6  C) Temp src: Oral BP: 127/75 Pulse: 74 Heart Rate: 83 Resp: 18 SpO2: 97 % O2 Device: None (Room air)    Vitals:    03/14/19 0516 03/15/19 0700 03/16/19 0637   Weight: 43.6 kg (96 lb 3.2 oz) 41.7 kg (91 lb 14.4 oz) 42 kg (92 lb 11.2 oz)     Vital Signs with Ranges  Temp:  [97.8  F (36.6  C)-98.3  F (36.8  C)] 97.8  F (36.6  C)  Pulse:  [74-85] 74  Heart Rate:  [83] 83  Resp:  [12-20] 18  BP: (122-149)/(72-91) 127/75  SpO2:  [92 %-98 %] 97 %  I/O last 3 completed shifts:  In: 520 [P.O.:520]  Out: -     General: Resting comfortably, alert and oriented to self/location, when asked the year she says  \"I don't care!\", breathing comfortably, NAD  CVS: HRRR, no MGR, trace bilateral LE edema  Respiratory: faint crackles towards the R base but otherwise clear throughout, no wheeze/rhonchi, no increased work of breathing  GI: S, NT, ND, +BS  Skin: Warm/dry    Discharge Disposition   Discharged to short-term care facility  Condition at discharge: Stable    Consultations This Hospital Stay   CARDIOLOGY IP CONSULT  SPEECH LANGUAGE PATH ADULT IP CONSULT  PHYSICAL THERAPY ADULT IP CONSULT  OCCUPATIONAL THERAPY ADULT IP CONSULT    Time Spent on this Encounter   IMahnaz, personally saw the patient today and spent greater than 30 minutes discharging this patient.    Discharge Orders      General info for SNF    Length of Stay Estimate: Short Term Care: Estimated # of " Days <30  Condition at Discharge: Improving  Level of care:skilled   Rehabilitation Potential: Good  Admission H&P remains valid and up-to-date: Yes  Recent Chemotherapy: N/A  Use Nursing Home Standing Orders: N/A     Mantoux instructions    Give two-step Mantoux (PPD) Per Facility Policy Yes     Follow Up and recommended labs and tests    1. Follow up with facility physician or PCP in the next weeks.   2. BMP check on Monday 3/18/19 to follow up on sodium and kidney function.   3. Needs repeat TSH/FT4 in 4-6 weeks.     Activity - Up with nursing assistance     DNR/DNI     Physical Therapy Adult Consult    Evaluate and treat as clinically indicated.    Reason:  Physical deconditioning     Occupational Therapy Adult Consult    Evaluate and treat as clinically indicated.    Reason:  Physical deconditioning     Speech Language Path Adult Consult    Evaluate and treat as clinically indicated.    Reason:  Dysphagia     Advance Diet as Tolerated    Follow this diet upon discharge: Clear Liquid Diet Nectar Thickened Liquids (pre-thickened or use instant food thickener) (By spoon)     Discharge Medications   Current Discharge Medication List      START taking these medications    Details   aspirin (ASA) 81 MG chewable tablet Take 1 tablet (81 mg) by mouth daily    Associated Diagnoses: ACS (acute coronary syndrome) (H)      atorvastatin (LIPITOR) 20 MG tablet Take 1 tablet (20 mg) by mouth every evening    Associated Diagnoses: ACS (acute coronary syndrome) (H)      cefdinir (OMNICEF) 300 MG capsule Take 1 capsule (300 mg) by mouth daily for 3 days    Associated Diagnoses: UTI (urinary tract infection) with pyuria      erythromycin (ROMYCIN) 5 MG/GM ophthalmic ointment Place Into the left eye 4 times daily for 3 days    Associated Diagnoses: Bacterial conjunctivitis of left eye         CONTINUE these medications which have CHANGED    Details   HYDROcodone-acetaminophen (NORCO)  MG per tablet Take 1 tablet by mouth 3  times daily as needed for severe pain  Qty: 15 tablet, Refills: 0    Comments: Future refills by PCP Dr. Dayton Huston with phone number [phone].  Associated Diagnoses: Lumbar disc disease with radiculopathy      lisinopril (PRINIVIL/ZESTRIL) 10 MG tablet Take 1 tablet (10 mg) by mouth daily    Associated Diagnoses: ACS (acute coronary syndrome) (H); Essential hypertension      metoprolol succinate ER (TOPROL-XL) 100 MG 24 hr tablet Take 1 tablet (100 mg) by mouth daily    Associated Diagnoses: ACS (acute coronary syndrome) (H); Essential hypertension         CONTINUE these medications which have NOT CHANGED    Details   acetaminophen (TYLENOL) 325 MG tablet Take 2 tablets (650 mg) by mouth every 4 hours as needed for mild pain  Qty: 100 tablet    Associated Diagnoses: Lumbar disc disease with radiculopathy      albuterol (PROAIR HFA) 108 (90 BASE) MCG/ACT inhaler INHALE 2 PUFFS BY MOUTH EVERY 4 TO 6 HOURS AS NEEDED FOR WHEEZE  Qty: 2 Inhaler, Refills: 4    Associated Diagnoses: Intermittent asthma      AMLODIPINE BESYLATE PO Take 5 mg by mouth daily      citalopram (CELEXA) 40 MG tablet TAKE ONE TABLET BY MOUTH DAILY  Qty: 90 tablet, Refills: 0    Associated Diagnoses: Depressive disorder, not elsewhere classified      Multiple Vitamins-Minerals (CENTRUM SILVER) per tablet Take 1 tablet by mouth daily.      omeprazole (PRILOSEC) 20 MG capsule TAKE ONE CAPSULE BY MOUTH DAILY 30 MINUTES BEFORE BREAKFAST  Qty: 30 capsule, Refills: 0    Associated Diagnoses: GERD (gastroesophageal reflux disease)      order for DME Equipment being ordered: rib belt  Qty: 1 Device, Refills: 0    Associated Diagnoses: Closed fracture of multiple ribs of right side, initial encounter; Rib pain on right side         STOP taking these medications       GABAPENTIN PO Comments:   Reason for Stopping:             Allergies   Allergies   Allergen Reactions     Fish Allergy      Throat swelled, can tolerate shrimp,crab      Novocain [Procaine Hcl] Anaphylaxis     Face swelled difficulty breathing     Shellfish-Derived Products Shortness Of Breath     Fosamax [Alendronate Sodium]      Levofloxacin      Spreading painful erythema     Penicillins      Unsure of reaction     Data   Most Recent 3 CBC's:  Recent Labs   Lab Test 03/16/19  0603 03/12/19  0529 03/11/19  1100   WBC 9.5 9.5 15.2*   HGB 12.7 11.9 13.6   MCV 92 89 90    219 247      Most Recent 3 BMP's:  Recent Labs   Lab Test 03/16/19  0603 03/15/19  1614 03/15/19  0626 03/14/19  0547   *  --  131* 131*   POTASSIUM 3.9 4.2 3.3* 3.5   CHLORIDE 98  --  95 96   CO2 28  --  28 27   BUN 44*  --  42* 29   CR 1.50*  --  1.52* 1.35*   ANIONGAP 6  --  8 8   NANCY 8.2*  --  8.3* 8.6   *  --  115* 119*     Most Recent 2 LFT's:  Recent Labs   Lab Test 03/12/19  0120 03/11/19  1100   AST 70* 78*   ALT 24 29   ALKPHOS 92 117   BILITOTAL 0.5 0.9     Most Recent INR's and Anticoagulation Dosing History:  Anticoagulation Dose History     Recent Dosing and Labs Latest Ref Rng & Units 7/31/2012 3/11/2019    INR 0.86 - 1.14 0.99 1.08        Most Recent 3 Troponin's:  Recent Labs   Lab Test 03/12/19  0529 03/12/19  0020 03/11/19  2110  07/31/12  1123   TROPI 2.632* 4.273* 4.849*   < >  --    TROPONIN  --   --   --   --  0.13*    < > = values in this interval not displayed.     Most Recent 6 Bacteria Isolates From Any Culture (See EPIC Reports for Culture Details):  Recent Labs   Lab Test 03/11/19  1117 03/11/19  1114 03/11/19  1111 05/22/17  1500 05/22/17  1127 05/22/17  1122   CULT No growth after 5 days No growth after 5 days 50,000 to 100,000 colonies/mL  Klebsiella pneumoniae  *  50,000 to 100,000 colonies/mL  Strain 2  Klebsiella pneumoniae  *  50,000 to 100,000 colonies/mL  Strain 3  Klebsiella pneumoniae  * Heavy growth Normal les No growth >100,000 colonies/mL Escherichia coli  >100,000 colonies/mL Klebsiella pneumoniae  *     Most Recent TSH, T4 and A1c Labs:  Recent  Labs   Lab Test 03/11/19  1100   TSH 10.85*   T4 1.01     Results for orders placed or performed during the hospital encounter of 03/11/19   CT Head w/o Contrast    Narrative    CT SCAN OF THE HEAD WITHOUT CONTRAST   3/11/2019 12:19 PM     HISTORY: Trauma, decreased mental status.    TECHNIQUE:  Axial images of the head and coronal reformations without  IV contrast material. Radiation dose for this scan was reduced using  automated exposure control, adjustment of the mA and/or kV according  to patient size, or iterative reconstruction technique.    COMPARISON: 11/21/2017    FINDINGS:  There is generalized atrophy of the brain.  There is low  attenuation in the white matter of the cerebral hemispheres consistent  with sequelae of small vessel ischemic disease. Old infarct is seen in  the right superior cerebellar hemisphere. Old lacunar infarct is seen  in head of the right caudate nucleus. There is no evidence of  intracranial hemorrhage, mass, acute infarct or anomaly.     The visualized portions of the sinuses and mastoids appear normal.  There is no evidence of trauma.      Impression    IMPRESSION:   1. No acute abnormality.  2. Atrophy of the brain.  White matter changes consistent with  sequelae of small vessel ischemic disease.  3. 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  4. No change.     ROMIE DAMIAN MD   CT Cervical Spine w/o Contrast    Narrative    CT CERVICAL SPINE WITHOUT CONTRAST   3/11/2019 12:19 PM     HISTORY: Trauma, decreased mental status.     TECHNIQUE: Axial images of the cervical spine were obtained without  intravenous contrast. Multiplanar reformations were performed.   Radiation dose for this scan was reduced using automated exposure  control, adjustment of the mA and/or kV according to patient size, or  iterative reconstruction technique.    COMPARISON: 12/12/2014    FINDINGS: There is no evidence of fracture, but motion artifacts limit  detail for small fractures. There is multilevel degenerative  disc  disease and degenerative facet arthropathy. No evidence of hematoma.  Paraspinous soft tissues appear normal. Scoliosis convex to the left.    Alveolar infiltrates with intermixed groundglass densities are noted  in lateral aspect of the right upper lobe suggestive of pneumonia.      Impression    IMPRESSION:  1. No evidence of acute trauma, but motion artifacts limit detail.  2. Multilevel degenerative disc disease and degenerative facet  arthropathy, unchanged.  3. Right upper lobe pulmonary infiltrate laterally consistent with  pneumonia.    ROMIE DAMIAN MD   XR Chest 1 View    Narrative    CHEST ONE VIEW  3/11/2019 12:14 PM     HISTORY: cough, weakness    COMPARISON: 5/22/2017    FINDINGS : Increased opacity at the lateral right upper lobe is  consistent with infiltrate/pneumonia. Subtle curvilinear opacity in  the lateral lower right lung is similar but slightly more prominent  than on the previous exam. Minimal focal left upper lobe opacity. Left  lung otherwise clear. Heart and pulmonary vessels within normal  limits. Tortuous thoracic aorta with calcification. Degenerative  changes left shoulder. Scoliosis.      Impression    IMPRESSION: Consistent with right-sided pneumonia. Follow-up to ensure  complete resolution.    PO MORRISON MD   XR Chest 2 Views    Narrative    CHEST TWO VIEWS 3/12/2019 9:35 AM     HISTORY: Follow up infiltrate. Assess for effusion and pulmonary  edema.    COMPARISON: March 11, 2019     FINDINGS: Small bilateral pleural effusions and associated compressive  atelectasis and/or infiltrate. Remaining lungs clear. The cardiac  silhouette is not enlarged. Pulmonary vasculature is unremarkable.      Impression    IMPRESSION: Small bilateral pleural effusions and associated  atelectasis and/or infiltrate. No definite vascular congestion  demonstrated.    CLAYTON JONES MD   XR Chest Port 1 View    Narrative    CHEST ONE VIEW PORTABLE   3/13/2019 9:35 AM     HISTORY:  CHF.    COMPARISON: 3/12/2019      Impression    IMPRESSION: Mild vascular prominence similar to prior. No evidence of  pulmonary edema or pleural effusion. No pneumothorax or airspace  consolidation. Heart size stable.    JAMEY MORELOS MD   CT Chest w/o Contrast    Narrative    CT CHEST WITHOUT CONTRAST 3/14/2019 8:11 PM     HISTORY: Shortness of breath.    COMPARISON: September 20, 2013    TECHNIQUE: Volumetric helical acquisition of CT images of the chest  from the clavicles to the kidneys were acquired without IV contrast.  Radiation dose for this scan was reduced using automated exposure  control, adjustment of the mA and/or kV according to patient size, or  iterative reconstruction technique.    FINDINGS:  New trace pleural effusion on the right with associated  atelectasis and/or infiltrate. Trace pleural fluid on the left.  Remaining lungs clear. Trace apical fibrosis. There are moderate  atherosclerotic changes of the visualized aorta and its branches.  There is no evidence of aortic aneurysm. There are extensive coronary  vascular calcifications and/or stents consistent with coronary artery  disease. No acute findings in the visualized upper abdomen.      Impression    IMPRESSION: Minimal right pleural effusion and associated atelectasis  and/or infiltrate. Otherwise lungs clear.    CLAYTON JONES MD

## 2019-03-16 NOTE — PROGRESS NOTES
Speech Language Therapy Discharge Summary    Reason for therapy discharge:    Discharged to transitional care facility.    Progress towards therapy goal(s). [facility] for goal details.  Goals partially met.  Barriers to achieving goals:   discharge from facility.    Therapy recommendation(s):    Continued therapy is recommended.  Rationale/Recommendations:  see last SLP note from 3/15.    Discharge Planner SLP   Patient plan for discharge: Not stated.   Current status: Patient seen for swallowing treatment. Patient reporting discomfort with both legs, lower denture.  Oral cares performed and provided blankets and foot rest, positioning.  Patient tolerating nectar thick liquids by spoon and cup.  Recommend continue nectar thick full liquid diet at this time as swallow response is delayed and patient intermittently unable to wear dentures.   Barriers to return to prior living situation: Weakness, confusion.   Recommendations for discharge: TCU  Rationale for recommendations: Recommend SLP follow up at discharge for safe return to least restrictive diet.

## 2019-03-16 NOTE — PROGRESS NOTES
SW:  D:  Received discharge orders for patient.  Bed available at [facility] for today.  Contrib wheelchair ride set up for 13:00 today.  Patient informed of the plan and in agreement to the plan.  Call placed and message left for patient's daughter to update her as to the plan.  Call placed to update [facility] and faxed the orders and PAS.    PAS-RR    D: Per DHS regulation, SW completed and submitted PAS-RR to [place] [employer] Direct [employer] via the Senior [employer] Line.  PAS-RR confirmation # is : [id number].    I: GIRISH spoke with patient's daughter Etta and they are aware a PAS-RR has been submitted.  GIRISH reviewed with patient's daughter Etta that they may be contacted for a follow up appointment within 10 days of hospital discharge if their SNF stay is < 30 days.  Contact information for [facility] was also provided.    A: Patient's daughter Etta verbalized understanding.    P: Further questions may be directed to [facility] at #[phone], option #4 for PAS-RR staff.

## 2019-03-17 LAB
BACTERIA SPEC CULT: NO GROWTH
BACTERIA SPEC CULT: NO GROWTH
Lab: NORMAL
Lab: NORMAL
SPECIMEN SOURCE: NORMAL
SPECIMEN SOURCE: NORMAL

## 2019-03-18 ENCOUNTER — HOSPITAL ENCOUNTER (INPATIENT)
Facility: CLINIC | Age: 84
LOS: 2 days | Discharge: NURSING, REHAB OR LONG-TERM CARE | DRG: 189 | End: 2019-03-20
Attending: EMERGENCY MEDICINE | Admitting: INTERNAL MEDICINE
Payer: MEDICARE

## 2019-03-18 ENCOUNTER — RECORDS - HEALTHEAST (OUTPATIENT)
Dept: LAB | Facility: CLINIC | Age: 84
End: 2019-03-18

## 2019-03-18 ENCOUNTER — APPOINTMENT (OUTPATIENT)
Dept: CT IMAGING | Facility: CLINIC | Age: 84
DRG: 189 | End: 2019-03-18
Attending: EMERGENCY MEDICINE
Payer: MEDICARE

## 2019-03-18 DIAGNOSIS — H10.9 BACTERIAL CONJUNCTIVITIS OF LEFT EYE: ICD-10-CM

## 2019-03-18 DIAGNOSIS — J45.21 MILD INTERMITTENT ASTHMA WITH EXACERBATION: Primary | ICD-10-CM

## 2019-03-18 DIAGNOSIS — M51.16 LUMBAR DISC DISEASE WITH RADICULOPATHY: ICD-10-CM

## 2019-03-18 DIAGNOSIS — J44.1 COPD EXACERBATION (H): ICD-10-CM

## 2019-03-18 DIAGNOSIS — R06.02 SOB (SHORTNESS OF BREATH): ICD-10-CM

## 2019-03-18 PROBLEM — R79.89 ELEVATED TROPONIN: Status: ACTIVE | Noted: 2019-03-18

## 2019-03-18 PROBLEM — J96.01 ACUTE RESPIRATORY FAILURE WITH HYPOXIA (H): Status: ACTIVE | Noted: 2019-03-18

## 2019-03-18 LAB
ANION GAP SERPL CALCULATED.3IONS-SCNC: 12 MMOL/L (ref 5–18)
ANION GAP SERPL CALCULATED.3IONS-SCNC: 13 MMOL/L (ref 3–14)
BASE DEFICIT BLDA-SCNC: 0.4 MMOL/L
BASOPHILS # BLD AUTO: 0 10E9/L (ref 0–0.2)
BASOPHILS NFR BLD AUTO: 0.1 %
BUN SERPL-MCNC: 29 MG/DL (ref 8–28)
BUN SERPL-MCNC: 34 MG/DL (ref 7–30)
CALCIUM SERPL-MCNC: 8.7 MG/DL (ref 8.5–10.1)
CALCIUM SERPL-MCNC: 9.1 MG/DL (ref 8.5–10.5)
CHLORIDE BLD-SCNC: 97 MMOL/L (ref 98–107)
CHLORIDE SERPL-SCNC: 98 MMOL/L (ref 94–109)
CO2 SERPL-SCNC: 22 MMOL/L (ref 20–32)
CO2 SERPL-SCNC: 24 MMOL/L (ref 22–31)
CREAT SERPL-MCNC: 1.18 MG/DL (ref 0.6–1.1)
CREAT SERPL-MCNC: 1.23 MG/DL (ref 0.52–1.04)
DIFFERENTIAL METHOD BLD: ABNORMAL
EOSINOPHIL # BLD AUTO: 0 10E9/L (ref 0–0.7)
EOSINOPHIL NFR BLD AUTO: 0.3 %
ERYTHROCYTE [DISTWIDTH] IN BLOOD BY AUTOMATED COUNT: 13.7 % (ref 10–15)
GFR SERPL CREATININE-BSD FRML MDRD: 38 ML/MIN/{1.73_M2}
GFR SERPL CREATININE-BSD FRML MDRD: 43 ML/MIN/1.73M2
GLUCOSE BLD-MCNC: 92 MG/DL (ref 70–125)
GLUCOSE BLDC GLUCOMTR-MCNC: 193 MG/DL (ref 70–99)
GLUCOSE SERPL-MCNC: 137 MG/DL (ref 70–99)
HCO3 BLD-SCNC: 24 MMOL/L (ref 21–28)
HCT VFR BLD AUTO: 39.3 % (ref 35–47)
HGB BLD-MCNC: 13.2 G/DL (ref 11.7–15.7)
IMM GRANULOCYTES # BLD: 0.1 10E9/L (ref 0–0.4)
IMM GRANULOCYTES NFR BLD: 0.4 %
LYMPHOCYTES # BLD AUTO: 2 10E9/L (ref 0.8–5.3)
LYMPHOCYTES NFR BLD AUTO: 14.8 %
MCH RBC QN AUTO: 30.4 PG (ref 26.5–33)
MCHC RBC AUTO-ENTMCNC: 33.6 G/DL (ref 31.5–36.5)
MCV RBC AUTO: 91 FL (ref 78–100)
MONOCYTES # BLD AUTO: 1 10E9/L (ref 0–1.3)
MONOCYTES NFR BLD AUTO: 7.3 %
NEUTROPHILS # BLD AUTO: 10.5 10E9/L (ref 1.6–8.3)
NEUTROPHILS NFR BLD AUTO: 77.1 %
NRBC # BLD AUTO: 0 10*3/UL
NRBC BLD AUTO-RTO: 0 /100
NT-PROBNP SERPL-MCNC: ABNORMAL PG/ML (ref 0–1800)
O2/TOTAL GAS SETTING VFR VENT: NORMAL %
OXYHGB MFR BLD: 96 % (ref 92–100)
PCO2 BLD: 36 MM HG (ref 35–45)
PH BLD: 7.42 PH (ref 7.35–7.45)
PLATELET # BLD AUTO: 268 10E9/L (ref 150–450)
PO2 BLD: 89 MM HG (ref 80–105)
POTASSIUM BLD-SCNC: 3.3 MMOL/L (ref 3.5–5)
POTASSIUM SERPL-SCNC: 3.3 MMOL/L (ref 3.4–5.3)
RBC # BLD AUTO: 4.34 10E12/L (ref 3.8–5.2)
SODIUM SERPL-SCNC: 133 MMOL/L (ref 133–144)
SODIUM SERPL-SCNC: 133 MMOL/L (ref 136–145)
TROPONIN I SERPL-MCNC: 0.14 UG/L (ref 0–0.04)
WBC # BLD AUTO: 13.7 10E9/L (ref 4–11)

## 2019-03-18 PROCEDURE — 93005 ELECTROCARDIOGRAM TRACING: CPT

## 2019-03-18 PROCEDURE — 96375 TX/PRO/DX INJ NEW DRUG ADDON: CPT

## 2019-03-18 PROCEDURE — 25000125 ZZHC RX 250: Performed by: EMERGENCY MEDICINE

## 2019-03-18 PROCEDURE — 25000125 ZZHC RX 250

## 2019-03-18 PROCEDURE — 85025 COMPLETE CBC W/AUTO DIFF WBC: CPT | Performed by: EMERGENCY MEDICINE

## 2019-03-18 PROCEDURE — 99285 EMERGENCY DEPT VISIT HI MDM: CPT | Mod: 25

## 2019-03-18 PROCEDURE — 25000128 H RX IP 250 OP 636: Performed by: EMERGENCY MEDICINE

## 2019-03-18 PROCEDURE — 25000125 ZZHC RX 250: Performed by: INTERNAL MEDICINE

## 2019-03-18 PROCEDURE — 36600 WITHDRAWAL OF ARTERIAL BLOOD: CPT

## 2019-03-18 PROCEDURE — 25800030 ZZH RX IP 258 OP 636: Performed by: INTERNAL MEDICINE

## 2019-03-18 PROCEDURE — 71260 CT THORAX DX C+: CPT

## 2019-03-18 PROCEDURE — 99223 1ST HOSP IP/OBS HIGH 75: CPT | Mod: AI | Performed by: INTERNAL MEDICINE

## 2019-03-18 PROCEDURE — 84484 ASSAY OF TROPONIN QUANT: CPT | Performed by: EMERGENCY MEDICINE

## 2019-03-18 PROCEDURE — 82805 BLOOD GASES W/O2 SATURATION: CPT | Performed by: INTERNAL MEDICINE

## 2019-03-18 PROCEDURE — 12000000 ZZH R&B MED SURG/OB

## 2019-03-18 PROCEDURE — 87040 BLOOD CULTURE FOR BACTERIA: CPT | Performed by: EMERGENCY MEDICINE

## 2019-03-18 PROCEDURE — 25000128 H RX IP 250 OP 636: Performed by: INTERNAL MEDICINE

## 2019-03-18 PROCEDURE — 83880 ASSAY OF NATRIURETIC PEPTIDE: CPT | Performed by: EMERGENCY MEDICINE

## 2019-03-18 PROCEDURE — 96374 THER/PROPH/DIAG INJ IV PUSH: CPT | Mod: 59

## 2019-03-18 PROCEDURE — 94660 CPAP INITIATION&MGMT: CPT

## 2019-03-18 PROCEDURE — 94640 AIRWAY INHALATION TREATMENT: CPT | Mod: 76

## 2019-03-18 PROCEDURE — 80048 BASIC METABOLIC PNL TOTAL CA: CPT | Performed by: EMERGENCY MEDICINE

## 2019-03-18 PROCEDURE — 25000128 H RX IP 250 OP 636

## 2019-03-18 PROCEDURE — 40000275 ZZH STATISTIC RCP TIME EA 10 MIN

## 2019-03-18 PROCEDURE — 94640 AIRWAY INHALATION TREATMENT: CPT

## 2019-03-18 PROCEDURE — 40000281 ZZH STATISTIC TRANSPORT TIME EA 15 MIN

## 2019-03-18 PROCEDURE — 00000146 ZZHCL STATISTIC GLUCOSE BY METER IP

## 2019-03-18 RX ORDER — POTASSIUM CHLORIDE 1500 MG/1
20-40 TABLET, EXTENDED RELEASE ORAL
Status: DISCONTINUED | OUTPATIENT
Start: 2019-03-18 | End: 2019-03-20 | Stop reason: HOSPADM

## 2019-03-18 RX ORDER — ERYTHROMYCIN 5 MG/G
OINTMENT OPHTHALMIC 4 TIMES DAILY
Status: DISCONTINUED | OUTPATIENT
Start: 2019-03-18 | End: 2019-03-20 | Stop reason: HOSPADM

## 2019-03-18 RX ORDER — LIDOCAINE 40 MG/G
CREAM TOPICAL
Status: DISCONTINUED | OUTPATIENT
Start: 2019-03-18 | End: 2019-03-20 | Stop reason: HOSPADM

## 2019-03-18 RX ORDER — FUROSEMIDE 10 MG/ML
20 INJECTION INTRAMUSCULAR; INTRAVENOUS DAILY
Status: DISCONTINUED | OUTPATIENT
Start: 2019-03-18 | End: 2019-03-19

## 2019-03-18 RX ORDER — ONDANSETRON 2 MG/ML
4 INJECTION INTRAMUSCULAR; INTRAVENOUS EVERY 6 HOURS PRN
Status: DISCONTINUED | OUTPATIENT
Start: 2019-03-18 | End: 2019-03-20 | Stop reason: HOSPADM

## 2019-03-18 RX ORDER — IPRATROPIUM BROMIDE AND ALBUTEROL SULFATE 2.5; .5 MG/3ML; MG/3ML
3 SOLUTION RESPIRATORY (INHALATION) ONCE
Status: COMPLETED | OUTPATIENT
Start: 2019-03-18 | End: 2019-03-18

## 2019-03-18 RX ORDER — DEXTROSE MONOHYDRATE 25 G/50ML
25-50 INJECTION, SOLUTION INTRAVENOUS
Status: DISCONTINUED | OUTPATIENT
Start: 2019-03-18 | End: 2019-03-20 | Stop reason: HOSPADM

## 2019-03-18 RX ORDER — AMOXICILLIN 250 MG
2 CAPSULE ORAL 2 TIMES DAILY
Status: DISCONTINUED | OUTPATIENT
Start: 2019-03-18 | End: 2019-03-20 | Stop reason: HOSPADM

## 2019-03-18 RX ORDER — NICOTINE POLACRILEX 4 MG
15-30 LOZENGE BUCCAL
Status: DISCONTINUED | OUTPATIENT
Start: 2019-03-18 | End: 2019-03-20 | Stop reason: HOSPADM

## 2019-03-18 RX ORDER — IPRATROPIUM BROMIDE AND ALBUTEROL SULFATE 2.5; .5 MG/3ML; MG/3ML
SOLUTION RESPIRATORY (INHALATION)
Status: COMPLETED
Start: 2019-03-18 | End: 2019-03-18

## 2019-03-18 RX ORDER — POTASSIUM CHLORIDE 1.5 G/1.58G
20-40 POWDER, FOR SOLUTION ORAL
Status: DISCONTINUED | OUTPATIENT
Start: 2019-03-18 | End: 2019-03-20 | Stop reason: HOSPADM

## 2019-03-18 RX ORDER — BISACODYL 10 MG
10 SUPPOSITORY, RECTAL RECTAL DAILY PRN
Status: DISCONTINUED | OUTPATIENT
Start: 2019-03-18 | End: 2019-03-20 | Stop reason: HOSPADM

## 2019-03-18 RX ORDER — MAGNESIUM SULFATE HEPTAHYDRATE 40 MG/ML
4 INJECTION, SOLUTION INTRAVENOUS EVERY 4 HOURS PRN
Status: DISCONTINUED | OUTPATIENT
Start: 2019-03-18 | End: 2019-03-20 | Stop reason: HOSPADM

## 2019-03-18 RX ORDER — METHYLPREDNISOLONE SODIUM SUCCINATE 40 MG/ML
40 INJECTION, POWDER, LYOPHILIZED, FOR SOLUTION INTRAMUSCULAR; INTRAVENOUS EVERY 8 HOURS
Status: DISCONTINUED | OUTPATIENT
Start: 2019-03-19 | End: 2019-03-19

## 2019-03-18 RX ORDER — LORAZEPAM 2 MG/ML
0.25 INJECTION INTRAMUSCULAR ONCE
Status: COMPLETED | OUTPATIENT
Start: 2019-03-18 | End: 2019-03-18

## 2019-03-18 RX ORDER — IPRATROPIUM BROMIDE AND ALBUTEROL SULFATE 2.5; .5 MG/3ML; MG/3ML
3 SOLUTION RESPIRATORY (INHALATION)
Status: DISCONTINUED | OUTPATIENT
Start: 2019-03-18 | End: 2019-03-18

## 2019-03-18 RX ORDER — IOPAMIDOL 755 MG/ML
48 INJECTION, SOLUTION INTRAVASCULAR ONCE
Status: COMPLETED | OUTPATIENT
Start: 2019-03-18 | End: 2019-03-18

## 2019-03-18 RX ORDER — NITROGLYCERIN 0.4 MG/1
0.4 TABLET SUBLINGUAL EVERY 5 MIN PRN
Status: DISCONTINUED | OUTPATIENT
Start: 2019-03-18 | End: 2019-03-20 | Stop reason: HOSPADM

## 2019-03-18 RX ORDER — OXYCODONE HYDROCHLORIDE 5 MG/1
5 TABLET ORAL EVERY 4 HOURS PRN
Status: DISCONTINUED | OUTPATIENT
Start: 2019-03-18 | End: 2019-03-20 | Stop reason: HOSPADM

## 2019-03-18 RX ORDER — AMLODIPINE BESYLATE 5 MG/1
5 TABLET ORAL DAILY
COMMUNITY

## 2019-03-18 RX ORDER — POTASSIUM CHLORIDE 7.45 MG/ML
10 INJECTION INTRAVENOUS
Status: DISCONTINUED | OUTPATIENT
Start: 2019-03-18 | End: 2019-03-20 | Stop reason: HOSPADM

## 2019-03-18 RX ORDER — POTASSIUM CL/LIDO/0.9 % NACL 10MEQ/0.1L
10 INTRAVENOUS SOLUTION, PIGGYBACK (ML) INTRAVENOUS
Status: DISCONTINUED | OUTPATIENT
Start: 2019-03-18 | End: 2019-03-20 | Stop reason: HOSPADM

## 2019-03-18 RX ORDER — LIDOCAINE 40 MG/G
CREAM TOPICAL
Status: DISCONTINUED | OUTPATIENT
Start: 2019-03-18 | End: 2019-03-18

## 2019-03-18 RX ORDER — AMOXICILLIN 250 MG
1 CAPSULE ORAL 2 TIMES DAILY
Status: DISCONTINUED | OUTPATIENT
Start: 2019-03-18 | End: 2019-03-20 | Stop reason: HOSPADM

## 2019-03-18 RX ORDER — METHYLPREDNISOLONE SODIUM SUCCINATE 125 MG/2ML
125 INJECTION, POWDER, LYOPHILIZED, FOR SOLUTION INTRAMUSCULAR; INTRAVENOUS ONCE
Status: COMPLETED | OUTPATIENT
Start: 2019-03-18 | End: 2019-03-18

## 2019-03-18 RX ORDER — BISACODYL 5 MG
10 TABLET, DELAYED RELEASE (ENTERIC COATED) ORAL DAILY PRN
Status: DISCONTINUED | OUTPATIENT
Start: 2019-03-18 | End: 2019-03-20 | Stop reason: HOSPADM

## 2019-03-18 RX ORDER — LORAZEPAM 2 MG/ML
INJECTION INTRAMUSCULAR
Status: COMPLETED
Start: 2019-03-18 | End: 2019-03-18

## 2019-03-18 RX ORDER — IPRATROPIUM BROMIDE AND ALBUTEROL SULFATE 2.5; .5 MG/3ML; MG/3ML
3 SOLUTION RESPIRATORY (INHALATION) EVERY 4 HOURS
Status: DISCONTINUED | OUTPATIENT
Start: 2019-03-19 | End: 2019-03-20 | Stop reason: HOSPADM

## 2019-03-18 RX ORDER — CEFTRIAXONE 1 G/1
1 INJECTION, POWDER, FOR SOLUTION INTRAMUSCULAR; INTRAVENOUS EVERY 24 HOURS
Status: DISCONTINUED | OUTPATIENT
Start: 2019-03-18 | End: 2019-03-20 | Stop reason: HOSPADM

## 2019-03-18 RX ORDER — BISACODYL 5 MG
15 TABLET, DELAYED RELEASE (ENTERIC COATED) ORAL DAILY PRN
Status: DISCONTINUED | OUTPATIENT
Start: 2019-03-18 | End: 2019-03-20 | Stop reason: HOSPADM

## 2019-03-18 RX ORDER — POTASSIUM CHLORIDE 29.8 MG/ML
20 INJECTION INTRAVENOUS
Status: DISCONTINUED | OUTPATIENT
Start: 2019-03-18 | End: 2019-03-20 | Stop reason: HOSPADM

## 2019-03-18 RX ORDER — BISACODYL 5 MG
5 TABLET, DELAYED RELEASE (ENTERIC COATED) ORAL DAILY PRN
Status: DISCONTINUED | OUTPATIENT
Start: 2019-03-18 | End: 2019-03-20 | Stop reason: HOSPADM

## 2019-03-18 RX ORDER — MORPHINE SULFATE 2 MG/ML
INJECTION, SOLUTION INTRAMUSCULAR; INTRAVENOUS
Status: COMPLETED
Start: 2019-03-18 | End: 2019-03-18

## 2019-03-18 RX ORDER — NALOXONE HYDROCHLORIDE 0.4 MG/ML
.1-.4 INJECTION, SOLUTION INTRAMUSCULAR; INTRAVENOUS; SUBCUTANEOUS
Status: DISCONTINUED | OUTPATIENT
Start: 2019-03-18 | End: 2019-03-20 | Stop reason: HOSPADM

## 2019-03-18 RX ORDER — ONDANSETRON 4 MG/1
4 TABLET, ORALLY DISINTEGRATING ORAL EVERY 6 HOURS PRN
Status: DISCONTINUED | OUTPATIENT
Start: 2019-03-18 | End: 2019-03-20 | Stop reason: HOSPADM

## 2019-03-18 RX ORDER — ACETAMINOPHEN 325 MG/1
650 TABLET ORAL EVERY 4 HOURS PRN
Status: DISCONTINUED | OUTPATIENT
Start: 2019-03-18 | End: 2019-03-20 | Stop reason: HOSPADM

## 2019-03-18 RX ORDER — DEXTROSE MONOHYDRATE, SODIUM CHLORIDE, AND POTASSIUM CHLORIDE 50; 1.49; 4.5 G/1000ML; G/1000ML; G/1000ML
INJECTION, SOLUTION INTRAVENOUS CONTINUOUS
Status: ACTIVE | OUTPATIENT
Start: 2019-03-18 | End: 2019-03-19

## 2019-03-18 RX ADMIN — IPRATROPIUM BROMIDE AND ALBUTEROL SULFATE 3 ML: .5; 2.5 SOLUTION RESPIRATORY (INHALATION) at 17:18

## 2019-03-18 RX ADMIN — METHYLPREDNISOLONE SODIUM SUCCINATE 125 MG: 125 INJECTION, POWDER, FOR SOLUTION INTRAMUSCULAR; INTRAVENOUS at 18:10

## 2019-03-18 RX ADMIN — LORAZEPAM 0.12 MG: 2 INJECTION INTRAMUSCULAR; INTRAVENOUS at 18:02

## 2019-03-18 RX ADMIN — IPRATROPIUM BROMIDE AND ALBUTEROL SULFATE 3 ML: .5; 3 SOLUTION RESPIRATORY (INHALATION) at 17:27

## 2019-03-18 RX ADMIN — IPRATROPIUM BROMIDE AND ALBUTEROL SULFATE 3 ML: .5; 3 SOLUTION RESPIRATORY (INHALATION) at 17:18

## 2019-03-18 RX ADMIN — IPRATROPIUM BROMIDE AND ALBUTEROL SULFATE 3 ML: 2.5; .5 SOLUTION RESPIRATORY (INHALATION) at 23:33

## 2019-03-18 RX ADMIN — IOPAMIDOL 48 ML: 755 INJECTION, SOLUTION INTRAVENOUS at 17:05

## 2019-03-18 RX ADMIN — MORPHINE SULFATE 2 MG: 2 INJECTION, SOLUTION INTRAMUSCULAR; INTRAVENOUS at 17:18

## 2019-03-18 RX ADMIN — SODIUM CHLORIDE 77 ML: 9 INJECTION, SOLUTION INTRAVENOUS at 17:05

## 2019-03-18 RX ADMIN — LORAZEPAM 0.12 MG: 2 INJECTION INTRAMUSCULAR at 18:02

## 2019-03-18 RX ADMIN — POTASSIUM CHLORIDE, DEXTROSE MONOHYDRATE AND SODIUM CHLORIDE: 150; 5; 450 INJECTION, SOLUTION INTRAVENOUS at 20:08

## 2019-03-18 RX ADMIN — CEFTRIAXONE SODIUM 1 G: 1 INJECTION, POWDER, FOR SOLUTION INTRAMUSCULAR; INTRAVENOUS at 22:21

## 2019-03-18 RX ADMIN — FUROSEMIDE 20 MG: 10 INJECTION, SOLUTION INTRAVENOUS at 22:21

## 2019-03-18 ASSESSMENT — ENCOUNTER SYMPTOMS
COUGH: 0
DIARRHEA: 0
VOMITING: 0
WHEEZING: 1
SHORTNESS OF BREATH: 1
ARTHRALGIAS: 1

## 2019-03-18 ASSESSMENT — ACTIVITIES OF DAILY LIVING (ADL): ADLS_ACUITY_SCORE: 29

## 2019-03-18 ASSESSMENT — MIFFLIN-ST. JEOR: SCORE: 793.7

## 2019-03-18 NOTE — ED NOTES
"[facility]  ED Nurse Handoff Report    ED Chief complaint: Shortness of Breath (Pt discharged recently and is rehab. C/O left rib pain and SOB for the past two days.)      ED Diagnosis:   Final diagnoses:   SOB (shortness of breath)   COPD exacerbation (H)       Code Status: DNR / DNI    Allergies:   Allergies   Allergen Reactions     Fish Allergy      Throat swelled, can tolerate shrimp,crab     Novocain [Procaine Hcl] Anaphylaxis     Face swelled difficulty breathing     Shellfish-Derived Products Shortness Of Breath     Fosamax [Alendronate Sodium]      Levofloxacin      Spreading painful erythema     Penicillins      Unsure of reaction       Activity level - Baseline/Home:  Not assesssed in the ED     Activity Level - Current:   Unable to Assess     Needed?: No    Isolation: No  Infection: Not Applicable  Bariatric?: No    Vital Signs:   Vitals:    03/18/19 1745 03/18/19 1800 03/18/19 1815 03/18/19 1830   BP: 130/84 116/81 90/76 104/72   Pulse: 86 80 87 79   Resp:       Temp:       TempSrc:       SpO2: 98% 98% 99% 99%   Weight:       Height:           Cardiac Rhythm: ,   Cardiac  Cardiac Rhythm: Normal sinus rhythm    Pain level: 0-10 Pain Scale: 9    Is this patient confused?: No   Does this patient have a guardian?  No         If yes, is there guardianship documents in the Epic \"Code/ACP\" activity?  N/A         Guardian Notified?  N/A  [place] - Suicide Severity Rating Scale Completed?  Yes  If yes, what color did the patient score?  White    Patient Report: Initial Complaint: SOB   Focused Assessment: frail female, SOB, worse after Ct. Placed on BIPAP .   Tests Performed: labs CT chest   Abnormal Results:   Abnormal Labs Reviewed   CBC WITH PLATELETS DIFFERENTIAL - Abnormal; Notable for the following components:       Result Value    WBC 13.7 (*)     Absolute Neutrophil 10.5 (*)     All other components within normal limits   BASIC METABOLIC PANEL - Abnormal; Notable for the " following components:    Potassium 3.3 (*)     Glucose 137 (*)     Urea Nitrogen 34 (*)     Creatinine 1.23 (*)     GFR Estimate 38 (*)     GFR Estimate If Black 44 (*)     All other components within normal limits   TROPONIN I - Abnormal; Notable for the following components:    Troponin I ES 0.140 (*)     All other components within normal limits   NT PROBNP INPATIENT - Abnormal; Notable for the following components:    N-Terminal Pro BNP Inpatient 15,591 (*)     All other components within normal limits     Treatments provided: meds steroid, morphine ativan . Admission     Family Comments: dtr in room , updated.     OBS brochure/video discussed/provided to patient/family: N/A              Name of person given brochure if not patient: na              Relationship to patient: DTR    ED Medications:   Medications   Saline Flush - CT (77 mLs Intravenous Given 3/18/19 1705)   iopamidol (ISOVUE-370) solution 48 mL (48 mLs Intravenous Given 3/18/19 1705)   morphine (PF) 2 MG/ML injection (2 mg  Given 3/18/19 1718)   ipratropium - albuterol 0.5 mg/2.5 mg/3 mL (DUONEB) neb solution 3 mL (3 mLs Nebulization Given 3/18/19 1727)   methylPREDNISolone sodium succinate (solu-MEDROL) injection 125 mg (125 mg Intravenous Given 3/18/19 1810)   LORazepam (ATIVAN) injection 0.25 mg (0.125 mg Intravenous Given 3/18/19 1802)       Drips infusing?:  No    For the majority of the shift this patient was Green.   Interventions performed were na.    Severe Sepsis OR Septic Shock Diagnosis Present: No    To be done/followed up on inpatient unit:  monitor     ED NURSE PHONE NUMBER: [phone]

## 2019-03-18 NOTE — ED NOTES
Bed: ED03  Expected date:   Expected time:   Means of arrival:   Comments:  [facility] - 518 - 89F SOB eta 1500

## 2019-03-18 NOTE — ED NOTES
"Pt came back from CT , was fine angie soime time but then called because she was having a hard time breathing stating \" I can't breath , I can't breath. \" pt given 2mg MS iv for air humger and also 2 duo nebs given. Md was notified and came bedside to check patient., Rt has been called to place pt on Bipap.     Rt is present at the time of this note and is puttin gin line neb and bipap on .   "

## 2019-03-18 NOTE — PHARMACY-ADMISSION MEDICATION HISTORY
Admission medication history interview status for the 3/18/2019  admission is complete. See EPIC admission navigator for prior to admission medications     Medication history source reliability:Good    Actions taken by pharmacist (provider contacted, etc):  PTA list obtained from [facility] ([facility])     Additional medication history information not noted on PTA med list :    On [facility], these are the medication orders marked as pending confirmation which were ordered on 3/18/19, but patient never received the medications:  1. Potassium Chloride ER tablet 20MEQ, give one tablet by mouth one time only   2. Norco 10/325mg tablet, take 1 tablet by mouth as needed for bilateral leg pain  3. Norco tablet 10/325mg tablet, give 1 tablet by mouth two times daily for bilateral leg pain.     Medication reconciliation/reorder completed by provider prior to medication history? No    Time spent in this activity:  20 minutes    Prior to Admission medications    Medication Sig Last Dose Taking? Auth Provider   acetaminophen (TYLENOL) 325 MG tablet Take 2 tablets (650 mg) by mouth every 4 hours as needed for mild pain 3/18/2019 at 1300 Yes Tamiko Zamudio MD   albuterol (PROAIR HFA) 108 (90 BASE) MCG/ACT inhaler INHALE 2 PUFFS BY MOUTH EVERY 4 TO 6 HOURS AS NEEDED FOR WHEEZE 3/18/2019 at PRN Yes Dayton Huston MD   amLODIPine (NORVASC) 5 MG tablet Take 5 mg by mouth daily 3/18/2019 at AM Yes Unknown, Entered By History   aspirin (ASA) 81 MG chewable tablet Take 1 tablet (81 mg) by mouth daily 3/18/2019 at AM Yes Mahnaz Vang DO   atorvastatin (LIPITOR) 20 MG tablet Take 1 tablet (20 mg) by mouth every evening 3/17/2019 at HS Yes Mahnaz Vang DO   cefdinir (OMNICEF) 300 MG capsule Take 1 capsule (300 mg) by mouth daily for 3 days  Patient taking differently: Take 300 mg by mouth daily 3/17-3/19 3/18/2019 at AM Yes Mahnaz Vang DO   citalopram (CELEXA) 40  MG tablet TAKE ONE TABLET BY MOUTH DAILY 3/18/2019 at AM Yes Michael Saxena MD   erythromycin (ROMYCIN) 5 MG/GM ophthalmic ointment Place Into the left eye 4 times daily for 3 days  Patient taking differently: Place Into the left eye 4 times daily 3/17-3/19 3/18/2019 at noon Yes Mahnaz Vang,    HYDROcodone-acetaminophen (NORCO)  MG per tablet Take 1 tablet by mouth 3 times daily as needed for severe pain 3/17/2019 at 2000 Yes Mahnaz Vang DO   lisinopril (PRINIVIL/ZESTRIL) 10 MG tablet Take 1 tablet (10 mg) by mouth daily 3/18/2019 at AM Yes Mahnaz Vang DO   metoprolol succinate ER (TOPROL-XL) 100 MG 24 hr tablet Take 1 tablet (100 mg) by mouth daily 3/18/2019 at AM Yes Mahnaz Vang DO   Multiple Vitamins-Minerals (CENTRUM SILVER) per tablet Take 1 tablet by mouth daily. 3/18/2019 at AM Yes Unknown, Entered By History   omeprazole (PRILOSEC) 20 MG capsule TAKE ONE CAPSULE BY MOUTH DAILY 30 MINUTES BEFORE BREAKFAST 3/18/2019 at AM Yes Bill Smith MD   order for DME Equipment being ordered: Joshua Kirby, PA-C     Juanis Ferreira, PharmD IV Student

## 2019-03-18 NOTE — ED PROVIDER NOTES
History     Chief Complaint:  Shortness of breath     HPI   Laya Lutz is a 89 year old female with a history of hypertension and recent pneumonia who presents with shortness of breath. The patient currently lives at [facility] in [place] where she has been complaining of shortness of breath and left rib pain for the last 2 days. The patient was recently here at [facility] with pneumonia and was admitted for 4 days (3/11/2019-3/16/2019). Due to progressively worsening condition, the patient has been taken to the ED for evaluation and treatment. Upon arrival, EMS reports giving albuterol nebulizer due to expiratory wheezes. The patient denies any chest pain, cough, vomiting, diarrhea, or pain in general. The patient is currently taking Cefdinir and azithromycin ophthalmic drops.     Allergies:  fish allergy   Novocain   Shellfish-derived products  levofloxacin  Penicillins     Medications:    proair  Amlodipine besylate  lipitor  omnicef  celexa  romycin  norco  prinivil  toprol  prilosec     Past Medical History:    asthma  Atrial fibrillation   Depression  Hypertension   Confusional state  Urinary tract infection  Pneumonia     Past Surgical History:    appendectomy   Cataract right and left   Cholecystectomy  Discectomy  Hysterectomy    Family History:    No past pertinent family history.    Social History:  Marital Status:     Smoker:   Never   Smokeless:   Never   Alcohol:   Yes, occasional    Drugs:   No     Review of Systems   Respiratory: Positive for shortness of breath and wheezing. Negative for cough.    Cardiovascular: Negative for chest pain.   Gastrointestinal: Negative for diarrhea and vomiting.   Musculoskeletal: Positive for arthralgias.   All other systems reviewed and are negative.    Physical Exam     Patient Vitals for the past 24 hrs:   BP Temp Temp src Pulse Heart Rate Resp SpO2 Height Weight   03/18/19 1915 113/74 -- -- 79 -- -- 99 % -- --   03/18/19  "1900 105/73 -- -- 80 -- -- 99 % -- --   03/18/19 1845 91/69 -- -- 86 -- -- 99 % -- --   03/18/19 1830 104/72 -- -- 79 -- -- 99 % -- --   03/18/19 1815 90/76 -- -- 87 -- -- 99 % -- --   03/18/19 1800 116/81 -- -- 80 -- -- 98 % -- --   03/18/19 1745 130/84 -- -- 86 -- -- 98 % -- --   03/18/19 1730 (!) 178/99 -- -- 96 -- -- 98 % -- --   03/18/19 1600 -- -- -- -- 76 18 98 % -- --   03/18/19 1530 -- -- -- -- 75 23 96 % -- --   03/18/19 1504 121/85 97.7  F (36.5  C) Oral 92 -- 22 94 % 1.575 m (5' 2\") 43.5 kg (96 lb)     Physical Exam  GENERAL: Eyes closed. Mildly cachectic. Looks chronically ill and frail. Awakens to voice.  HEAD: atraumatic  EYES: pupils reactive, extraocular muscles intact, conjunctivae normal. Antibiotic ointment. No erythema or exudate.  ENT:  mucus membranes moist  NECK:  trachea midline, normal range of motion  RESPIRATORY: no tachypnea, diminished on the left side  CVS: normal S1/S2, no murmurs, intact distal pulses  ABDOMEN: soft, nontender, nondistention  MUSCULOSKELETAL: no deformities. Trace edema on LEs  SKIN: warm and dry, no acute rashes or ulceration  NEURO: GCS 15, cranial nerves intact, alert and oriented x3. Opens up her eyes to voice. Keeps eyes closed in general. Seems weak, generalized without focal weakness. Alert to person. Thinks she is in her nursing home.  PSYCH:  Flat affect    Emergency Department Course   ECG:  Indication: shortness of breath   Time: 1618  Vent. Rate 91 bpm. CA interval 162. QRS duration 90. QT/QTc 410/504. P-R-T axis 257 -19 -6. Unusual P axis, possible ectopic atrial rhythm with fusion complexes. Inferior infarct, age undetermined. Abnormal ECG. Read time: 1519    Imaging:  Radiographic findings were communicated with the patient who voiced understanding of the findings.    CT Chest w/ IV contrast pulmonary embolism protocol:   No PE identified. As per radiology.     Laboratory:  Blood cultures (X2): pending  1510 Troponin: 0.140  Nt Probnp inpatient (BNP): " 15,591  CBC: WBC: 13.7, HGB: 13.2, PLT: 268  BMP: Glucose 137, potassium 3.3, urea nitrogen 34, creatinine 1.23, GFR 38, o/w WNL    Interventions:  1718: Morphine 2 mg IV  1718: Duoneb 3 mL nebulization   1727: Duoneb 3 mL nebulization   1802: Ativan 0.125 mg IV  1810: methylprednisolone 125 mg IV    Emergency Department Course:  (1515) I performed an exam of the patient as documented above.     (1710) I rechecked the patient and discussed the results of their workup thus far.  (1736) I consulted with radiology, regarding the patient's history and presentation here in the emergency department.   (1801)  I consulted with Dr. Adhikari of the hospitalist services. They are in agreement to accept the patient for admission.    Findings and plan explained to the Patient who consents to admission. Discussed the patient with Dr. Adhikari, who will admit the patient to a [facility] bed for further monitoring, evaluation, and treatment.    Impression & Plan    Medical Decision Making:    Patient presents with shortness of breath with recent admission to the hospital for similar.  At that time she was treated for pneumonia and CHF and non-STEMI.  Patient is coming from a rehab facility.  They noted that she became more short of breath and was given a neb prior to arrival.  Patient is a poor historian and daughter that is at the bedside notes that she just got in from a 2-month on the road trip so does not know much more than what stated in the chart.  The other daughter that knows her medical history is not here and does not communicate that with her daughter.    CT chest for PE is negative for PE and speaking with the radiologist there is no other findings to suggest CHF or pneumonia.    Patient's EF is in the 20-25 range from prior admission.  BNP is elevated in the 15,000 range but prior BNP was in the 50,000 range.  Patient has some mild edema in her lower extremities.    I did not see any formal diagnosis of COPD but does have  albuterol written as needed.  Patient was given nebs here.  She is complaining of increased shortness of breath and appears anxious in general.  She was given morphine and a small amount of Ativan and placed on BiPAP.  Patient is DNR/DNI.  I see that she had a code rapid response in the past admission and tolerated BiPAP.  I spoke with the hospitalist regarding admission.    Diagnosis:    ICD-10-CM    1. SOB (shortness of breath) R06.02 Blood culture   2. COPD exacerbation (H) J44.1      Disposition:  Admitted to [facility]    Scribe Disclosure:  Michael JACKMAN, am serving as a scribe on 3/18/2019 at 3:15 PM to personally document services performed by Hesham Springer MD based on my observations and the provider's statements to me.     Michael Barbosa  3/18/2019    EMERGENCY DEPARTMENT       Hesham Springer MD  03/19/19 0059

## 2019-03-19 ENCOUNTER — APPOINTMENT (OUTPATIENT)
Dept: GENERAL RADIOLOGY | Facility: CLINIC | Age: 84
DRG: 189 | End: 2019-03-19
Attending: INTERNAL MEDICINE
Payer: MEDICARE

## 2019-03-19 ENCOUNTER — APPOINTMENT (OUTPATIENT)
Dept: SPEECH THERAPY | Facility: CLINIC | Age: 84
DRG: 189 | End: 2019-03-19
Attending: INTERNAL MEDICINE
Payer: MEDICARE

## 2019-03-19 ENCOUNTER — APPOINTMENT (OUTPATIENT)
Dept: PHYSICAL THERAPY | Facility: CLINIC | Age: 84
DRG: 189 | End: 2019-03-19
Attending: INTERNAL MEDICINE
Payer: MEDICARE

## 2019-03-19 LAB
ANION GAP SERPL CALCULATED.3IONS-SCNC: 10 MMOL/L (ref 3–14)
BUN SERPL-MCNC: 38 MG/DL (ref 7–30)
CALCIUM SERPL-MCNC: 8.5 MG/DL (ref 8.5–10.1)
CHLORIDE SERPL-SCNC: 99 MMOL/L (ref 94–109)
CO2 SERPL-SCNC: 23 MMOL/L (ref 20–32)
CREAT SERPL-MCNC: 1.35 MG/DL (ref 0.52–1.04)
ERYTHROCYTE [DISTWIDTH] IN BLOOD BY AUTOMATED COUNT: 13.8 % (ref 10–15)
GFR SERPL CREATININE-BSD FRML MDRD: 34 ML/MIN/{1.73_M2}
GLUCOSE BLDC GLUCOMTR-MCNC: 171 MG/DL (ref 70–99)
GLUCOSE BLDC GLUCOMTR-MCNC: 188 MG/DL (ref 70–99)
GLUCOSE BLDC GLUCOMTR-MCNC: 189 MG/DL (ref 70–99)
GLUCOSE BLDC GLUCOMTR-MCNC: 208 MG/DL (ref 70–99)
GLUCOSE SERPL-MCNC: 214 MG/DL (ref 70–99)
HCT VFR BLD AUTO: 39.1 % (ref 35–47)
HGB BLD-MCNC: 13.2 G/DL (ref 11.7–15.7)
INTERPRETATION ECG - MUSE: NORMAL
MCH RBC QN AUTO: 30.5 PG (ref 26.5–33)
MCHC RBC AUTO-ENTMCNC: 33.8 G/DL (ref 31.5–36.5)
MCV RBC AUTO: 90 FL (ref 78–100)
PLATELET # BLD AUTO: 242 10E9/L (ref 150–450)
POTASSIUM SERPL-SCNC: 3.4 MMOL/L (ref 3.4–5.3)
RBC # BLD AUTO: 4.33 10E12/L (ref 3.8–5.2)
SODIUM SERPL-SCNC: 132 MMOL/L (ref 133–144)
TROPONIN I SERPL-MCNC: 0.13 UG/L (ref 0–0.04)
TROPONIN I SERPL-MCNC: 0.14 UG/L (ref 0–0.04)
WBC # BLD AUTO: 6.4 10E9/L (ref 4–11)

## 2019-03-19 PROCEDURE — C9113 INJ PANTOPRAZOLE SODIUM, VIA: HCPCS | Performed by: INTERNAL MEDICINE

## 2019-03-19 PROCEDURE — 00000146 ZZHCL STATISTIC GLUCOSE BY METER IP

## 2019-03-19 PROCEDURE — 93010 ELECTROCARDIOGRAM REPORT: CPT | Performed by: INTERNAL MEDICINE

## 2019-03-19 PROCEDURE — 25000131 ZZH RX MED GY IP 250 OP 636 PS 637: Mod: GY | Performed by: INTERNAL MEDICINE

## 2019-03-19 PROCEDURE — 36415 COLL VENOUS BLD VENIPUNCTURE: CPT | Performed by: INTERNAL MEDICINE

## 2019-03-19 PROCEDURE — 94644 CONT INHLJ TX 1ST HOUR: CPT

## 2019-03-19 PROCEDURE — 25000128 H RX IP 250 OP 636: Performed by: INTERNAL MEDICINE

## 2019-03-19 PROCEDURE — 93005 ELECTROCARDIOGRAM TRACING: CPT

## 2019-03-19 PROCEDURE — A9270 NON-COVERED ITEM OR SERVICE: HCPCS | Mod: GY | Performed by: INTERNAL MEDICINE

## 2019-03-19 PROCEDURE — 12000000 ZZH R&B MED SURG/OB

## 2019-03-19 PROCEDURE — 25000132 ZZH RX MED GY IP 250 OP 250 PS 637: Mod: GY | Performed by: INTERNAL MEDICINE

## 2019-03-19 PROCEDURE — 94640 AIRWAY INHALATION TREATMENT: CPT | Mod: 76

## 2019-03-19 PROCEDURE — 71045 X-RAY EXAM CHEST 1 VIEW: CPT

## 2019-03-19 PROCEDURE — 94640 AIRWAY INHALATION TREATMENT: CPT

## 2019-03-19 PROCEDURE — 97530 THERAPEUTIC ACTIVITIES: CPT | Mod: GP | Performed by: PHYSICAL THERAPIST

## 2019-03-19 PROCEDURE — 40000275 ZZH STATISTIC RCP TIME EA 10 MIN

## 2019-03-19 PROCEDURE — 25000125 ZZHC RX 250: Performed by: INTERNAL MEDICINE

## 2019-03-19 PROCEDURE — 92610 EVALUATE SWALLOWING FUNCTION: CPT | Mod: GN | Performed by: SPEECH-LANGUAGE PATHOLOGIST

## 2019-03-19 PROCEDURE — G0463 HOSPITAL OUTPT CLINIC VISIT: HCPCS

## 2019-03-19 PROCEDURE — 97161 PT EVAL LOW COMPLEX 20 MIN: CPT | Mod: GP | Performed by: PHYSICAL THERAPIST

## 2019-03-19 PROCEDURE — 80048 BASIC METABOLIC PNL TOTAL CA: CPT | Performed by: INTERNAL MEDICINE

## 2019-03-19 PROCEDURE — 85027 COMPLETE CBC AUTOMATED: CPT | Performed by: INTERNAL MEDICINE

## 2019-03-19 PROCEDURE — 25800030 ZZH RX IP 258 OP 636: Performed by: INTERNAL MEDICINE

## 2019-03-19 PROCEDURE — 99233 SBSQ HOSP IP/OBS HIGH 50: CPT | Performed by: INTERNAL MEDICINE

## 2019-03-19 PROCEDURE — 84484 ASSAY OF TROPONIN QUANT: CPT | Performed by: INTERNAL MEDICINE

## 2019-03-19 PROCEDURE — 92526 ORAL FUNCTION THERAPY: CPT | Mod: GN | Performed by: SPEECH-LANGUAGE PATHOLOGIST

## 2019-03-19 RX ORDER — MULTIPLE VITAMINS W/ MINERALS TAB 9MG-400MCG
1 TAB ORAL DAILY
Status: DISCONTINUED | OUTPATIENT
Start: 2019-03-19 | End: 2019-03-20 | Stop reason: HOSPADM

## 2019-03-19 RX ORDER — METHYLPREDNISOLONE SODIUM SUCCINATE 40 MG/ML
40 INJECTION, POWDER, LYOPHILIZED, FOR SOLUTION INTRAMUSCULAR; INTRAVENOUS EVERY 8 HOURS
Status: COMPLETED | OUTPATIENT
Start: 2019-03-19 | End: 2019-03-20

## 2019-03-19 RX ORDER — SODIUM CHLORIDE, SODIUM LACTATE, POTASSIUM CHLORIDE, CALCIUM CHLORIDE 600; 310; 30; 20 MG/100ML; MG/100ML; MG/100ML; MG/100ML
INJECTION, SOLUTION INTRAVENOUS CONTINUOUS
Status: DISCONTINUED | OUTPATIENT
Start: 2019-03-19 | End: 2019-03-19

## 2019-03-19 RX ORDER — ASPIRIN 81 MG/1
81 TABLET, CHEWABLE ORAL DAILY
Status: DISCONTINUED | OUTPATIENT
Start: 2019-03-19 | End: 2019-03-20 | Stop reason: HOSPADM

## 2019-03-19 RX ORDER — ATORVASTATIN CALCIUM 10 MG/1
20 TABLET, FILM COATED ORAL EVERY EVENING
Status: DISCONTINUED | OUTPATIENT
Start: 2019-03-19 | End: 2019-03-20 | Stop reason: HOSPADM

## 2019-03-19 RX ORDER — AMLODIPINE BESYLATE 5 MG/1
5 TABLET ORAL DAILY
Status: DISCONTINUED | OUTPATIENT
Start: 2019-03-19 | End: 2019-03-20 | Stop reason: HOSPADM

## 2019-03-19 RX ORDER — PREDNISONE 20 MG/1
40 TABLET ORAL DAILY
Status: DISCONTINUED | OUTPATIENT
Start: 2019-03-20 | End: 2019-03-20 | Stop reason: HOSPADM

## 2019-03-19 RX ORDER — LISINOPRIL 10 MG/1
10 TABLET ORAL DAILY
Status: DISCONTINUED | OUTPATIENT
Start: 2019-03-19 | End: 2019-03-20 | Stop reason: HOSPADM

## 2019-03-19 RX ORDER — METOPROLOL SUCCINATE 100 MG/1
100 TABLET, EXTENDED RELEASE ORAL DAILY
Status: DISCONTINUED | OUTPATIENT
Start: 2019-03-19 | End: 2019-03-20 | Stop reason: HOSPADM

## 2019-03-19 RX ADMIN — IPRATROPIUM BROMIDE AND ALBUTEROL SULFATE 3 ML: 2.5; .5 SOLUTION RESPIRATORY (INHALATION) at 16:18

## 2019-03-19 RX ADMIN — IPRATROPIUM BROMIDE AND ALBUTEROL SULFATE 3 ML: 2.5; .5 SOLUTION RESPIRATORY (INHALATION) at 11:14

## 2019-03-19 RX ADMIN — ATORVASTATIN CALCIUM 20 MG: 10 TABLET, FILM COATED ORAL at 21:10

## 2019-03-19 RX ADMIN — AMLODIPINE BESYLATE 5 MG: 5 TABLET ORAL at 14:23

## 2019-03-19 RX ADMIN — CEFTRIAXONE SODIUM 1 G: 1 INJECTION, POWDER, FOR SOLUTION INTRAMUSCULAR; INTRAVENOUS at 21:09

## 2019-03-19 RX ADMIN — MULTIPLE VITAMINS W/ MINERALS TAB 1 TABLET: TAB at 17:11

## 2019-03-19 RX ADMIN — SENNOSIDES AND DOCUSATE SODIUM 1 TABLET: 8.6; 5 TABLET ORAL at 21:10

## 2019-03-19 RX ADMIN — ERYTHROMYCIN: 5 OINTMENT OPHTHALMIC at 08:13

## 2019-03-19 RX ADMIN — METHYLPREDNISOLONE SODIUM SUCCINATE 40 MG: 40 INJECTION, POWDER, FOR SOLUTION INTRAMUSCULAR; INTRAVENOUS at 02:37

## 2019-03-19 RX ADMIN — SODIUM CHLORIDE, POTASSIUM CHLORIDE, SODIUM LACTATE AND CALCIUM CHLORIDE: 600; 310; 30; 20 INJECTION, SOLUTION INTRAVENOUS at 10:35

## 2019-03-19 RX ADMIN — ERYTHROMYCIN: 5 OINTMENT OPHTHALMIC at 13:51

## 2019-03-19 RX ADMIN — ERYTHROMYCIN: 5 OINTMENT OPHTHALMIC at 22:28

## 2019-03-19 RX ADMIN — INSULIN ASPART 1 UNITS: 100 INJECTION, SOLUTION INTRAVENOUS; SUBCUTANEOUS at 13:50

## 2019-03-19 RX ADMIN — INSULIN ASPART 1 UNITS: 100 INJECTION, SOLUTION INTRAVENOUS; SUBCUTANEOUS at 18:02

## 2019-03-19 RX ADMIN — IPRATROPIUM BROMIDE AND ALBUTEROL SULFATE 3 ML: 2.5; .5 SOLUTION RESPIRATORY (INHALATION) at 08:35

## 2019-03-19 RX ADMIN — ASPIRIN 81 MG 81 MG: 81 TABLET ORAL at 14:23

## 2019-03-19 RX ADMIN — IPRATROPIUM BROMIDE AND ALBUTEROL SULFATE 3 ML: 2.5; .5 SOLUTION RESPIRATORY (INHALATION) at 05:32

## 2019-03-19 RX ADMIN — LISINOPRIL 10 MG: 10 TABLET ORAL at 14:23

## 2019-03-19 RX ADMIN — OXYCODONE HYDROCHLORIDE 5 MG: 5 TABLET ORAL at 17:11

## 2019-03-19 RX ADMIN — PANTOPRAZOLE SODIUM 40 MG: 40 INJECTION, POWDER, FOR SOLUTION INTRAVENOUS at 08:13

## 2019-03-19 RX ADMIN — METOPROLOL SUCCINATE 100 MG: 100 TABLET, EXTENDED RELEASE ORAL at 14:22

## 2019-03-19 RX ADMIN — FUROSEMIDE 20 MG: 10 INJECTION, SOLUTION INTRAVENOUS at 08:13

## 2019-03-19 RX ADMIN — METHYLPREDNISOLONE SODIUM SUCCINATE 40 MG: 40 INJECTION, POWDER, FOR SOLUTION INTRAMUSCULAR; INTRAVENOUS at 09:20

## 2019-03-19 RX ADMIN — OXYCODONE HYDROCHLORIDE 5 MG: 5 TABLET ORAL at 12:29

## 2019-03-19 RX ADMIN — IPRATROPIUM BROMIDE AND ALBUTEROL SULFATE 3 ML: 2.5; .5 SOLUTION RESPIRATORY (INHALATION) at 19:05

## 2019-03-19 RX ADMIN — METHYLPREDNISOLONE SODIUM SUCCINATE 40 MG: 40 INJECTION, POWDER, FOR SOLUTION INTRAMUSCULAR; INTRAVENOUS at 17:11

## 2019-03-19 RX ADMIN — ERYTHROMYCIN: 5 OINTMENT OPHTHALMIC at 17:59

## 2019-03-19 ASSESSMENT — ACTIVITIES OF DAILY LIVING (ADL)
FALL_HISTORY_WITHIN_LAST_SIX_MONTHS: NO
ADLS_ACUITY_SCORE: 30
COGNITION: 1 - ATTENTION OR MEMORY DEFICITS
ADLS_ACUITY_SCORE: 30
ADLS_ACUITY_SCORE: 29
AMBULATION: 3-->ASSISTIVE EQUIPMENT AND PERSON
DRESS: 2-->ASSISTIVE PERSON
TOILETING: 3-->ASSISTIVE EQUIPMENT AND PERSON
BATHING: 3-->ASSISTIVE EQUIPMENT AND PERSON
SWALLOWING: 0-->SWALLOWS FOODS/LIQUIDS WITHOUT DIFFICULTY
ADLS_ACUITY_SCORE: 30
TRANSFERRING: 3-->ASSISTIVE EQUIPMENT AND PERSON
PRIOR_FUNCTIONAL_LEVEL_COMMENT: PT POOR HISTORIAN
ADLS_ACUITY_SCORE: 30
RETIRED_COMMUNICATION: 0-->UNDERSTANDS/COMMUNICATES WITHOUT DIFFICULTY
ADLS_ACUITY_SCORE: 30
RETIRED_EATING: 0-->INDEPENDENT

## 2019-03-19 ASSESSMENT — MIFFLIN-ST. JEOR: SCORE: 796.25

## 2019-03-19 NOTE — PROGRESS NOTES
[facility] Nurse Inpatient Adult Pressure Injury (PI) Assessment     Assessment of PI(s) on pt's:   Bilateral 2nd toes    Data:   Patient History:     Laya Lutz is a 89 year old female with complicated past medical history of paroxysmal atrial fibrillation, not on anticoagulation, mild intermittent asthma, essential hypertension, depression, neuropathy, severe osteoarthritis and scoliosis on chronic narcotics, cognitive impairment, history of tachycardia induced cardiomyopathy, chronic kidney disease stage III with recent hospitalization from March 11-16 for urinary tract infection with Klebsiella pneumonia, type II non-STEMI from demand ischemia , right upper lobe pneumonia with sepsis and acute hypoxic respiratory failure was able to get weaned off from oxygen before discharge. Patient was admitted from the emergency room today from acute hypoxic respiratory failure most likely associated with asthma exacerbation with mildly elevated BNP and troponin.       Silvio Risk Assessment  Sensory Perception: 3-->slightly limited    Moisture: 3-->occasionally moist   Activity: 2-->chairfast     Mobility: 2-->very limited   Nutrition: 2-->probably inadequate   Silvio Score: 14    Positioning: full assist for turning  Mattress:  Atmos air      Current Diet / Nutrition:      Combination Diet Dysphagia Diet Level 1: Pureed; Nectar Thickened Liquids (pre-thickened or use instant food thickener)      Labs:   Recent Labs   Lab Test 03/19/19  0615  03/12/19  0120 03/11/19  1100   ALBUMIN  --   --  2.4* 3.0*   HGB 13.2   < >  --  13.6   INR  --   --   --  1.08   WBC 6.4   < >  --  15.2*    < > = values in this interval not displayed.                                                                                                                          Pressure Injury Assessment  (location):  Bilateral 2nd toe knuckles  #1:  Lt 2nd toe knuckle:             Size: .2cm x .2cm x 100% scabbed base @ apex of  "toe Knuckle            Mariah-wound skin:  no erythema, skin intact            Drainage: none            Odor: none    #2:  Rt 2nd toe knuckle:             Size: .2cm x .2cm x .1cm with 100% dried pink base @ apex of toe Knuckle            Mariah-wound skin:  no erythema, skin intact            Drainage: none            Odor: none    #3:  Rt 3nd toe knuckle:             Size: .2cm x .2cm x .1cm with 100% callous looking  base @ apex of toe Knuckle            Mariah-wound skin:  no erythema, skin intact            Drainage: none            Odor: none         Intervention:     Patient's chart evaluated.      Silvio Interventions:  Current Silvio Interventions and Care Plan reviewed and updated, appropriate at this time.    Wound assessed    Wound care: Comfeel to wounds    Orders   In Epic    Supplies  In pt room    Discussed plan of care with daughter           Assessment:    Bilateral 2nd toe knuckles and Rt 3rd toe knuckle:  Unstageable PI, community acquired, secondary to toe deformity and rubbing on slippers. No local s/s infection         Plan:     Nursing to notify the Provider(s) and re-consult the WOC Nurse if wound(s) deteriorate(s)or if the wound care plan needs reevaluation.    If pt is refusing to turn or reposition they must be educated on the  potential injury from not off loading pressure.  Then this \"educated refusal\" needs to be documented as an \"educated refusal to turn/ reposition\" and document if alert, etc.          Bilateral 2nd toe knuckles and Rt 3rd toe knuckle: change dressing on Tues/Friday and prn        1. Clean MicroKlenz        2. Cut tiny piece of comfeel hydrocolloid and cover wound beds        3. Heel suspension @ all times in bed    WOC Nurse will return: weekly and prn    "

## 2019-03-19 NOTE — CONSULTS
"CLINICAL NUTRITION SERVICES  -  ASSESSMENT NOTE      Recommendations Ordered by Registered Dietitian (RD):   Boost Plus (chocolate) BID between meals   Malnutrition:   % Weight Loss:  > 5% in 1 month (severe malnutrition)  % Intake:  Decreased intake does not meet criteria for malnutrition - pt unable to express any decreased intake   Subcutaneous Fat Loss:  Orbital region moderate depletion and Upper arm region severe depletion  Muscle Loss:  Temporal region moderate depletion, Clavicle bone region severe depletion, Scapular bone region severe depletion and Dorsal hand region severe depletion  Fluid Retention:  None noted    Malnutrition Diagnosis: Severe malnutrition  In Context of:  Acute illness or injury  Chronic illness or disease        REASON FOR ASSESSMENT  Laya Lutz is a 89 year old female seen by Registered Dietitian for Provider Order - \"concern for malnutrition\"       NUTRITION HISTORY  - Information obtained from patient and daughter:  - Pt lives in [facility] where 2 meals are provided each day. Pt is unsure if she has anything else to eat throughout the day  - Pt and daughter note that pt goes down for both provided meals, but likely does not finish each meal completley   - Pt is slightly confused, \"I think I'm a good eater... I don't know, maybe I'm not\"   - Pt and daughter do not think pt has weighed much over 105# over the last few years  - Daughter reports that she has been out of the state for the last 6 weeks. Between the last time she saw her mom (patient) and today, daughter notes pt has become significantly more thin and frail. Pt is usually thin, but over the last 6 weeks has lost weight, muscle and fat  - Pt recalls having Ensure in the past, which she enjoyed     - Pt recently admitted here and seen by the RD on 3/12      CURRENT NUTRITION ORDERS  Diet Order:     DD1, pureed diet, NTL     Current Intake/Tolerance:  Pt able to eat 2 dishes of apple sauce today " "and sipping apple juice during our visit. Daughter notes that this is the most pt has eaten all day    SLP following for swallowing concerns       NUTRITION FOCUSED PHYSICAL ASSESSMENT (NFPA)  Completed:        Yes:          Partial assessment - unable to observe LE, pt resting comfortably   Observed  Muscle Wasting - see below (note, difficult to determine acute changes vs sarcopenia)  Subcutaneous fat loss - see below  (note, difficult to determine acute changes vs sarcopenia)  Obtained from Chart/Interdisciplinary Team  Cachectic     ANTHROPOMETRICS  Height: 5' 2\"  Weight: 96 lbs (43.8 kg)  Body mass index is 17.66 kg/m .  Weight Status:  Underweight BMI <18.5  IBW: 50 kg  % IBW: 87%  Weight History: Per Care Everywhere, it appears pt had gained some weight between Aug 2018 and January 2019. Since 1/29/19 (1.5 months), pt has lost around 14# (12% body wt)  Wt Readings from Last 10 Encounters:   03/19/19 43.8 kg (96 lb 9 oz)   03/16/19 42 kg (92 lb 11.2 oz)   11/21/17 48.1 kg (106 lb)   10/08/17 52.6 kg (116 lb)   05/25/17 49.2 kg (108 lb 7.5 oz)   04/21/17 51.7 kg (114 lb)   01/02/17 47.6 kg (104 lb 15 oz)   04/02/16 48.2 kg (106 lb 4.8 oz)   03/15/16 49.7 kg (109 lb 8 oz)   04/01/15 54.6 kg (120 lb 5.9 oz)     Per Care Everywhere:  01/25/19 -- 111#  10/29/18 -- 108#  08/07/18 -- 104#    LABS  Labs reviewed  Recent Labs   Lab 03/19/19  1133 03/19/19  0759 03/19/19  0615 03/18/19  2236 03/18/19  1510 03/16/19  0603 03/15/19  0626 03/14/19  0547 03/13/19  0621   GLC  --   --  214*  --  137* 112* 115* 119* 132*   * 188*  --  193*  --   --   --   --   --        MEDICATIONS  Medications reviewed    ASSESSED NUTRITION NEEDS PER APPROVED PRACTICE GUIDELINES:    Dosing Weight 43.8 kg - current   Estimated Energy Needs: 0764-9559 kcals (30-35 Kcal/Kg)  Justification: repletion  Estimated Protein Needs: 52-65+ grams protein (1.2-1.5+ g pro/Kg)  Justification: Repletion    MALNUTRITION:  % Weight Loss:  > 5% in 1 " month (severe malnutrition)  % Intake:  Decreased intake does not meet criteria for malnutrition - pt unable to express any decreased intake   Subcutaneous Fat Loss:  Orbital region moderate depletion and Upper arm region severe depletion  Muscle Loss:  Temporal region moderate depletion, Clavicle bone region severe depletion, Scapular bone region severe depletion and Dorsal hand region severe depletion  Fluid Retention:  None noted    Malnutrition Diagnosis: Severe malnutrition  In Context of:  Acute illness or injury  Chronic illness or disease    NUTRITION DIAGNOSIS:  Inadequate oral intake related to decreased appetite, confusion and swallowing difficulty as evidenced by wt loss up to 12% over the past 1.5 months and e/o some acute fat and muscle wasting       NUTRITION INTERVENTIONS  Recommendations / Nutrition Prescription  ADAT per SLP    Boost Plus BID between meals (10-2)    Implementation  Nutrition education: Per Provider order if indicated   Medical Food Supplement: as above    Nutrition Goals  Pt to consume >50% meals and supplements BID      MONITORING AND EVALUATION:  Progress towards goals will be monitored and evaluated per protocol and Practice Guidelines        Renetta Menjivar RD, LD  Clinical Dietitian

## 2019-03-19 NOTE — PROGRESS NOTES
03/19/19 1045   Quick Adds   Type of Visit Initial PT Evaluation   Living Environment   Lives With facility resident   Living Arrangements assisted living   Living Environment Comment Patient was admitted from TCU. She was there after hospitalization from 3/11-3/16. Per chart she amb with ww at AL.    Self-Care   Usual Activity Tolerance moderate   Current Activity Tolerance poor   Regular Exercise No   Equipment Currently Used at Home walker, rolling   Activity/Exercise/Self-Care Comment Per notes last admission, patient uses a 4ww and was independent with ADL's at AL.    Functional Level Prior   Ambulation 3-->assistive equipment and person   Transferring 3-->assistive equipment and person   Toileting 3-->assistive equipment and person   Bathing 3-->assistive equipment and person   Fall history within last six months no   Prior Functional Level Comment Patient is a poor historian so information gathered from last admissions notes.    General Information   Onset of Illness/Injury or Date of Surgery - Date 03/18/19   Referring Physician Micaela Adhikari MD   Patient/Family Goals Statement She want to wet her lips. Very focused on this.    Precautions/Limitations fall precautions   General Observations Patient very focused on wanting to wet her lips and then on difficulty breathing. O2 sat remained at least 95% throughout session. HR as high at 105.    Cognitive Status Examination   Orientation person   Level of Consciousness alert   Follows Commands and Answers Questions 75% of the time   Personal Safety and Judgment impaired;at risk behaviors demonstrated   Cognitive Comment Patient confused and difficulty following commands for transfers. She put her feet on therapists foot instead of on the floor. Unable to follow commands for correct foot placement,.    Pain Assessment   Patient Currently in Pain Yes, see Vital Sign flowsheet   Integumentary/Edema   Integumentary/Edema Comments FRANDY MCDERMOTT's extremely sensitive to any  "touch. If told ahead of time therapist was going to touch them then not quite as sensitive.    Posture    Posture Forward head position;Protracted shoulders   Range of Motion (ROM)   ROM Comment B UE and LE ROM WFL   Strength   Strength Comments Able to lift each LE off the bed. Able to left UE's through partial range against gravity. Generally very limited activity tolerance.    Bed Mobility   Bed Mobility Comments Mod assist and multiple cues for technique.    Transfer Skills   Transfer Comments Mod assist of 2 and extra time needed to manually assist her with correct foot and hand placement.    Gait   Gait Comments Unable due to limited tolerance for standing.    Balance   Balance Comments Needs min assist for sitting balance. Tends to lean backwards. Mod assist for standing balance even with support of walker.    Sensory Examination   Sensory Perception Comments LE's very sensitive to touch.    General Therapy Interventions   Planned Therapy Interventions balance training;bed mobility training;gait training;transfer training   Clinical Impression   Criteria for Skilled Therapeutic Intervention yes, treatment indicated   PT Diagnosis Impaired functional independence   Influenced by the following impairments Decreased ability to follow commands, decreased activity tolerance, pain to touch in LE's.    Functional limitations due to impairments Needs assist for all functional mobility.    Clinical Presentation Stable/Uncomplicated   Clinical Presentation Rationale Current presentation   Clinical Decision Making (Complexity) Low complexity   Therapy Frequency` 5 times/week   Predicted Duration of Therapy Intervention (days/wks) 3 days   Anticipated Discharge Disposition Transitional Care Facility   Risk & Benefits of therapy have been explained Yes   Patient, Family & other staff in agreement with plan of care Yes   [facility] AM-PAC  \"6 Clicks\" V.2 Basic Mobility Inpatient Short Form   1. Turning from your " back to your side while in a flat bed without using bedrails? 2 - A Lot   2. Moving from lying on your back to sitting on the side of a flat bed without using bedrails? 2 - A Lot   3. Moving to and from a bed to a chair (including a wheelchair)? 2 - A Lot   4. Standing up from a chair using your arms (e.g., wheelchair, or bedside chair)? 2 - A Lot   5. To walk in hospital room? 1 - Total   6. Climbing 3-5 steps with a railing? 1 - Total   Basic Mobility Raw Score (Score out of 24.Lower scores equate to lower levels of function) 10   Total Evaluation Time   Total Evaluation Time (Minutes) 12

## 2019-03-19 NOTE — PLAN OF CARE
Confused and occasionally hears voices. Weaned off of BiPAP, sating high 90's on NC. Incontinent of urine and stool. Repo q2hr. Two suspected pressure ulcers on toes, WOC consult placed.

## 2019-03-19 NOTE — PLAN OF CARE
Discharge Planner PT   Patient plan for discharge: None stated. Patient not able to tell therapist where she lives.   Current status: Patient seen for initial eval and treatment initiated. Per chart, patient was living in an AL prior to her admission earlier in March from the 11th to the 16th. She discharged to TCU after that admission.  Per chart, she amb with ww at AL .Currently she needs mod assist for sup to sit and min assist to maintain sitting balance as she tends to lean backwards. Mod assist of 2 for sit to stand and needed her feet blocked to keep them from sliding out from under her. She c/o difficulty breathing throughout session. On room air and O2 sat remained between 95-98%. HR fluctuated between 98 and 105 during mobility. Patient not following commands well enough to stand a second time to returned to supine. Nurse informed of patients complaints. Patient currently with very limited activity tolerance and difficulty following command so recommend OT defer treatment to next level of care when patients tolerance has increased.   Barriers to return to prior living situation: Current level of assist needed, decreased cognition, very limited safety awareness.   Recommendations for discharge: TCU  Rationale for recommendations: Patient currently below baseline based on information in chart. She was admitted from TCU as she was recently admitted from 3?11-3/16.        Entered by: Yris Sullivan 03/19/2019 10:34 AM

## 2019-03-19 NOTE — PLAN OF CARE
Discharge Planner OT   Patient plan for discharge: -  Current status: order received, chart reviewed. Per discussion with PT, pt requiring A of 2 for all mobility, difficulty following commands, and very little tolerance for activity. Will defer OT evaluation to next level of care as pt will require TCU stay.   Barriers to return to prior living situation: current level of A x 2, poor activity tolerance   Recommendations for discharge: per PT, TCU   Rationale for recommendations: pt needs currently being met by PT as pt with very limited tolerance for therapy. PT to continue to address mobility and transfers as pt able to tolerate. Defer OT evaluation to next level of care.        Entered by: Paris Stoll 03/19/2019 10:48 AM

## 2019-03-19 NOTE — PROVIDER NOTIFICATION
Brief update:    Paged regarding pain medications.  Patient screaming out when nursing attempts cares.    Continues on BiPAP with FiO2 of 30%.  Low tidal volumes.    Per admitting note earlier this evening, plan is to hold narcotics until respiratory status improves    Would continue to hold narcotics overnight to see if patient's respiratory status improves as narcotics clear.  Question of some degree of patient calling out is related to encephalopathy/startle.  It appears the patient is not her own decision maker and has a history of encephalopathy.    Joshua Ryan MD  11:32 PM

## 2019-03-19 NOTE — PLAN OF CARE
Discharge Planner SLP   Patient plan for discharge: Not stated.   Current status: Clinical swallow evaluation completed.  Patient known to this service per recent admission.  She continues to present with oropharyngeal dysphagia and aspiration Sx with thin liquids.  Patient refusing dentures at this time due to oral dryness and pain.  Recommend dysphagia diet level 1 with nectar thick liquids, only when alert and upright, close supervision and assistance needed for meals.   Barriers to return to prior living situation: Weakness, pain.   Recommendations for discharge: TCU  Rationale for recommendations: Recommend SLP services at discharge for safe return to baseline oral diet, compensatory swallow strategy training.        Entered by: Miya Shay 03/19/2019 12:56 PM

## 2019-03-19 NOTE — PROGRESS NOTES
[facility]    Medicine Progress Note - Hospitalist Service       Date of Admission:  3/18/2019  Assessment & Plan   Laya Lutz is a 89 year old female with complicated past medical history of paroxysmal atrial fibrillation, not on anticoagulation, mild intermittent asthma, essential hypertension, depression, neuropathy, severe osteoarthritis and scoliosis on chronic narcotics, cognitive impairment, history of tachycardia induced cardiomyopathy, chronic kidney disease stage III with recent hospitalization from March 11-16 for urinary tract infection with Klebsiella pneumonia, right upper lobe pneumonia with sepsis and acute hypoxic respiratory failure who was able to be weaned off of oxygen before discharge. Patient was admitted from the emergency room today from acute hypoxic respiratory failure most likely associated with asthma exacerbation with mildly elevated BNP    Acute respiratory failure with hypoxia  Mild intermittent asthma with exacerbation  Patient presented with shortness of breath and found to have acute respiratory failure thought to be due to asthma exacerbation.  Patient was requiring 4 L of oxygen, but was then placed on BiPAP for comfort due to respiratory distress.  CT scan of the chest was done which was negative for pulmonary embolism and infiltrates.  Of note, patient was on Rocephin during her last hospitalization and then was discharged on Omnicef to complete the course for pneumonia and UTI secondary to Klebsiella pneumonia.  - Continue Ceftriaxone   - Stopped Lasix   - Titrated off of O2   - Scheduled Duonebs  - Continue IV Solu-Medrol and switch to Prednisone tomorrow    Elevated troponin  Recently hospitalized a week ago and at that time had a peak troponin of almost 5.  Patient was seen by cardiology and an echocardiogram was obtained (see below).  It was felt that this elevation was due to increased demand (NSTEMI type II).  On presentation her troponin still  slightly elevated at 0.14 and still down trending to 0.131.  Suspect that this troponin elevation is due to continued down trend in the setting of CKD    Stress-induced cardiomyopathy.  Not decompensated  HTN   HLP   H/o Atrial fibrillation   Echocardiogram done during last hospitalization on March 11 showed ejection fraction around 25-30%, there is anterior, septal and apical wall akinesis, moderately dilated left ventricle, mildly dilated left atrium and moderate 2+ mitral regurgitation.  Her BNP was elevated around 54,000 during her last hospitalization which is down to 15,000 on current admission   - Stopped Lasix as above   - Restarted PTA Lisinopril, Toprol XL, Norvasc, ASA and Lipitor     Generalized weakness  Most likely deconditioning due to recent hospitalization   - Discharge back to TCU once medically stable     Depression   - Restart PTA Celexa       Diet: NPO for Medical/Clinical Reasons Except for: Meds, Ice Chips    DVT Prophylaxis: Pneumatic Compression Devices  Ma Catheter: not present  Code Status: DNR/DNI      Disposition Plan   Expected discharge: Tomorrow, recommended to transitional care unit once safe disposition plan/ TCU bed available.  Entered: Mauro Baeza DO 03/19/2019, 9:59 AM       The patient's care was discussed with the Bedside Nurse, Care Coordinator/ and Patient.    Mauro Baeza DO  Hospitalist Service  [facility]    ______________________________________________________________________    Interval History   Patient seen and examined.  Able to be weaned off of O2 this morning without issue.  Currently the patient complains her mouth is dry and has no other concerns.  No pain     Data reviewed today: I reviewed all medications, new labs and imaging results over the last 24 hours. I personally reviewed no images or EKG's today.    Physical Exam   Vital Signs: Temp: 97.5  F (36.4  C) Temp src: Axillary BP: (!) 157/99 Pulse: 82 Heart Rate:  81 Resp: 22 SpO2: 100 % O2 Device: Nasal cannula Oxygen Delivery: 1 LPM  Weight: 96 lbs 8.98 oz  General Appearance: Resting comfortably.  NAD   Respiratory: Clear to auscultation.  No respiratory distress on 1 L of O2 via NC   Cardiovascular: RRR.  No murmurs  GI: Bowel sounds present.  No tenderness  Skin: Bilateral lower extremity erythema without significant warmth  Other: Bilateral lower extremity edema noted      Data   Recent Labs   Lab 03/19/19  0615 03/19/19  0027 03/18/19  1510 03/16/19  0603   WBC 6.4  --  13.7* 9.5   HGB 13.2  --  13.2 12.7   MCV 90  --  91 92     --  268 241   *  --  133 132*   POTASSIUM 3.4  --  3.3* 3.9   CHLORIDE 99  --  98 98   CO2 23  --  22 28   BUN 38*  --  34* 44*   CR 1.35*  --  1.23* 1.50*   ANIONGAP 10  --  13 6   NANCY 8.5  --  8.7 8.2*   *  --  137* 112*   TROPI 0.131* 0.140* 0.140*  --      Recent Results (from the past 24 hour(s))   Chest CT, IV contrast only - PE protocol    Narrative    CT CHEST PULMONARY EMBOLISM WITH CONTRAST  3/18/2019 5:10 PM     HISTORY: Pulmonary embolism suspected. Intermediate prob, positive  D-dimer. Recent admission for shortness of breath with right-sided  pneumonia, high BNP with possible congestive heart failure, elevated  troponin, etc.      COMPARISON: 3/14/2019 noncontrast exam.    TECHNIQUE: Pulmonary embolism protocol with attention to the pulmonary  arteries.  IV contrast: 48 mL Isovue-370. Coronal reconstructions.  Radiation dose for this scan was reduced using automated exposure  control, adjustment of the mA and/or kV according to patient size, or  iterative reconstruction technique.    FINDINGS: No pulmonary embolism identified. Aortic and coronary artery  calcifications. The contrast bolus is suboptimal for aortic dissection  evaluation. Again there is ascending thoracic aortic ectasia and  minimal descending thoracic aortic aneurysm (3 cm diameter). No  pleural effusion or significant change otherwise.       Impression    IMPRESSION: No PE identified.    DON NUNEZ MD

## 2019-03-19 NOTE — PROGRESS NOTES
Hospitalist cross cover    Paged regarding family requesting something for anxiety.  Ordered seroquel 6.25 mg tid prn.

## 2019-03-19 NOTE — PLAN OF CARE
Pt alert with intermittent confusion. VSS while on bipap, pressures running slightly high. Tele: Sinus rhythm, but patient did have some brief runs of what looks like A-flutter that resolved before intervention was needed. Lung sounds coarse with occasional expiratory wheezes. Agitated at times calling out and did strike at stafff during cares. Legs quite sensitive to pain. Incontinent of bowel and bladder, purwik in place and catching some but not all urine. Mepilex applied to coccyx. Pt does have some non-blanchable blotchy skin on her lower back/coccyx. Turned q2h.

## 2019-03-19 NOTE — PROGRESS NOTES
D: GIRISH following for discharge planning. Anticipate discharge back to TCU Wednesday. Per chart review, pt discharged from [facility] 3/16/2019 to [facility].  I: GIRISH spoke with Radha at [facility]. Pt is not holding her bed but Radha will have a bed for her on Wednesday. Referral sent via DOD.   P: GIRISH following.     CLAY Colindres, LGSW  [phone]

## 2019-03-19 NOTE — H&P
[facility]    History and Physical : Hospitalist Service:        Date of Admission:  3/18/2019    Cumulative Summary:   Laya Lutz is a 89 year old female with complicated past medical history of paroxysmal atrial fibrillation, not on anticoagulation, mild intermittent asthma, essential hypertension, depression, neuropathy, severe osteoarthritis and scoliosis on chronic narcotics, cognitive impairment, history of tachycardia induced cardiomyopathy, chronic kidney disease stage III with recent hospitalization from March 11-16 for urinary tract infection with Klebsiella pneumonia, type II non-STEMI from demand ischemia , right upper lobe pneumonia with sepsis and acute hypoxic respiratory failure was able to get weaned off from oxygen before discharge. Patient was admitted from the emergency room today from acute hypoxic respiratory failure most likely associated with asthma exacerbation with mildly elevated BNP and troponin.    Assessment & Plan     Active Problems:  Acute respiratory failure with hypoxia (H) (3/18/2019): Patient was requiring 4 L of oxygen, but was then placed on BiPAP for comfort due to respiratory distress.  CT scan of the chest was done which was negative for pulmonary embolism and infiltrates.  Patient was with Rocephin during her last hospitalization and then was discharged on Omnicef to complete the course for pneumonia and UTI secondary to Klebsiella pneumonia.  Mild intermittent asthma with exacerbation (5/22/2017): does not seem to be on any maintenance inhaler    Elevated troponin (3/18/2019): most likely sec to type 2 MI from acute resp distress , does not seem to have any chest pain or ischemic changes on the telemetry , need to rule out NSTEMI also   Stress-induced cardiomyopathy (8/6/2012): Echocardiogram done during last hospitalization on March 11 showed ejection fraction around 25-30%, there is anterior, septal and apical wall akinesis, moderately dilated left  ventricle, mildly dilated left atrium and moderate 2+ mitral regurgitation.  Her BNP was elevated around 54,000 during her last hospitalization which is down to 15,000 today.  Generalized weakness (12/28/2016): Most likely secondary to above    --[place] [facility] [place] of her respiratory status.  --Continue patient on BiPAP for respiratory distress, and continue to wean patient off BiPAP and on oxygen.  --We will keep patient n.p.o. at this point while patient is on BiPAP, will hold her antihypertensive medications because of normal blood pressure.  --Due to her ischemic cardiomyopathy, will give patient very gentle IV fluids at 50 cc/h for 10 hours  --Start patient on IV Solu-Medrol 40 mg every 8 hours.  --Start patient on Protonix 40 mg IV daily.  --DuoNeb every 4 hours initially then can be changed to 4 times daily.  --We will also order DuoNeb every 2 hours as needed for shortness of breath.  --Start patient on IV ceftriaxone to preemptively cover for infection associated with acute asthma exacerbation, his CT scan is negative for infiltrates, will repeat chest x-ray tomorrow once patient is slightly hydrated, if patient starts to make improvement then probably she can be tapered off antibiotics quickly.  --Check serial troponin.  --Repeat 12-lead EKG now.  --2D echo of results were reviewed from recent admission, patient did have 2+ mitral regurgitation and some wall motion abnormalities but at this time considering patient comorbidities and age, not sure if patient is a good candidate for coronary angiogram.  We will continue to have further discussion with family depending upon results from the serial troponin.  -- Her home dose of Ativan is ordered due to her history of anxiety, patient seems to be much more comfortable while on BiPAP and is sleeping, according to daughter patient was not able to get much rest today due to respiratory distress.  -- I confirmed with patient and her daughter  regarding DNR/DNI status.  I have discussed the plan of care with Lia Lutz who was available at the bedside today, patient other daughter has DURABLE POWER OF  who is already informed regarding patient admission via her rehab facility is in agreement.  -- holding her chronic narcotics , can be started slowly tomorrow once patient resp status is improved   -- speech consultation for swallow evaluation  -- Nutrition eval due to concern for malnutrition    Essential hypertension   --We will hold her antihypertensive medications at this point.    Depression   -- will hold her Home Celexa     Physical deconditioning (8/6/2012)  -- currently is in rehab     Diet: NPO except ice chips at this point   Ma Catheter: not present  DVT Prophylaxis: Pneumatic Compression Devices and Anti-embolisim stockings (TEDs)  Code Status: DNR / DNI    Disposition: Expecting patient to be in the hospital for the next few days, concern for overall improvement considering her comorbidities, advanced age and recurrent hospitalizations.  We will continue to update family.    The patient's care was discussed with the Patient and Patient's Family.    Micaela Adhikari MD,FACP    ----------------------------------------------------------------------------------------------------------------------    Primary Care Physician   Dayton Huston    Chief Complaint   Shortness of breath     History is obtained from the patient    Patient Active Problem List   Diagnosis     Diarrhea     Tachycardia     Essential hypertension     Depression     Left leg pain     Radicular leg pain     Confusional state     UTI (urinary tract infection)     Physical deconditioning     Stress-induced cardiomyopathy     Asthma     Hand pain, left     Spinal stenosis of lumbar region     Aftercare following surgery     Lumbar disc disease with radiculopathy     Hypertension     Hypertensive urgency     Encephalopathy acute     Generalized weakness      Aspiration of food, initial encounter     Mild intermittent asthma with exacerbation     ACP (advance care planning)     Sepsis (H)     ACS (acute coronary syndrome) (H)     Pneumonia     Acute respiratory failure with hypoxia (H)     Elevated troponin       History of Present Illness   Laya Lutz is a 89 year old female with complicated past medical history of paroxysmal atrial fibrillation not on chronic anticoagulation, mild intermittent asthma, essential hypertension, depression, neuropathy, severe osteoarthritis and scoliosis on chronic narcotics, cognitive impairment, history of tachycardia suitable cardiomyopathy, chronic kidney disease is stage III who was admitted on March 11 after being found down on the floor at her assisted living facility.  Patient was discharged from the hospital on March 16 after being hospitalized for right upper lobe pneumonia causing sepsis, acute hypoxic respiratory failure, patient was able to get weaned off from oxygen before discharge.Patient was also treated for urinary tract infection which grew Klebsiella pneumonia, type II non-STEMI from demand ischemia and was discharged on oral Omnicef to complete a course of antibiotic after discharge. Patient was brought from her rehab facility back to the ER due to the concern for shortness of breath this afternoon.    Patient was able to provide limited history due to being in respiratory distress and on BiPAP, daughter was present at the bedside.  According to daughter she was out of the state and was visiting her mother this morning at the rehab facility but patient continued to complain about shortness of breath, patient was not on any oxygen as she was weaned off oxygen before discharge.  Patient was brought to the emergency room for further evaluation and management.  Patient was afebrile with temperature of 97.7, heart rate of 75 and blood pressure of 121/85, she was saturating 87% on room air but now saturating 94% on 4 L  but was then changed to BiPAP to help the respiratory distress which help patient significantly.  Patient was also given some Ativan as she seemed anxious about her respiratory distress.  Patient is denying any fever or chills.  Patient does have a known history of asthma and thinks that that might be contributing to her shortness of breath at this point she did have some bronchospasm on arrival and was given a dose of IV Solu-Medrol. Due to patient recent history of pneumonia and hospitalization CT scan of the chest was done which was negative for pulmonary embolism and pneumonia.  She did [place] [address] which is improved as compared to before she has been using low-dose of Lasix.  Her troponin was also slightly elevated at 0.14, patient did not have any ischemic changes on the admission and is denying any chest pain at this point, her WBC count elevated at 13.7 with absolute neutrophil count of 10.5.  Patient was given IV steroids, was a started on BiPAP and was admitted for further evaluation and management.    Review of Systems   CONSTITUTIONAL:  positive for  fatigue and generalized weakness.  EYES:  negative for blurred vision and visual disturbance  HEENT:  negative for hoarseness and voice change  RESPIRATORY:    Positive for dyspnea and wheezing , patient is denying any cough as per history of present illness  CARDIOVASCULAR:  negative for chest pain, palpitations, orthopnea, exertional chest pressure/discomfort  GASTROINTESTINAL: Negative for abd pain, nausea , vomiting ,constipation and abdominal pain  GENITOURINARY: Negative for burning /urgency and frequency.  HEMATOLOGIC/LYMPHATIC:  negative  ALLERGIC/IMMUNOLOGIC:  negative for drug reactions  ENDOCRINE:  negative for diabetic symptoms including polyuria, polydipsia and weight loss  MUSCULOSKELETAL:  positive for arthralgias  NEUROLOGICAL:  negative  BEHAVIOR/PSYCH: negative      Past Medical History    I have reviewed this  patient's medical history and updated it with pertinent information if needed.   Past Medical History:   Diagnosis Date     Asthma      Atrial fibrillation (H)      Depression      Unspecified essential hypertension        Past Surgical History   I have reviewed this patient's surgical history and updated it with pertinent information if needed.  Past Surgical History:   Procedure Laterality Date     APPENDECTOMY       CATARACT IOL, RT/LT       CHOLECYSTECTOMY       DISCECTOMY LUMBAR POSTERIOR MICROSCOPIC ONE LEVEL  9/17/2012    Procedure: DISCECTOMY LUMBAR POSTERIOR MICROSCOPIC ONE LEVEL;  MICRODISCECTOMY LEFT L3-4;  Surgeon: Abrahan Cabral MD;  Location: [facility]     HYSTERECTOMY         Prior to Admission Medications   Prior to Admission Medications   Prescriptions Last Dose Informant Patient Reported? Taking?   HYDROcodone-acetaminophen (NORCO)  MG per tablet 3/17/2019 at [facility] Home No Yes   Sig: Take 1 tablet by mouth 3 times daily as needed for severe pain   Multiple Vitamins-Minerals (CENTRUM SILVER) per tablet 3/18/2019 at AM Nursing Home Yes Yes   Sig: Take 1 tablet by mouth daily.   acetaminophen (TYLENOL) 325 MG tablet 3/18/2019 at 1300 [facility] [place] No Yes   Sig: Take 2 tablets (650 mg) by mouth every 4 hours as needed for mild pain   albuterol (PROAIR HFA) 108 (90 BASE) MCG/ACT inhaler 3/18/2019 at [facility] Nursing Home No Yes   Sig: INHALE 2 PUFFS BY MOUTH EVERY 4 TO 6 HOURS AS NEEDED FOR WHEEZE   amLODIPine (NORVASC) 5 MG tablet 3/18/2019 at AM Nursing Home Yes Yes   Sig: Take 5 mg by mouth daily   aspirin (ASA) 81 MG chewable tablet 3/18/2019 at AM Nursing Home No Yes   Sig: Take 1 tablet (81 mg) by mouth daily   atorvastatin (LIPITOR) 20 MG tablet 3/17/2019 at  Nursing [place] No Yes   Sig: Take 1 tablet (20 mg) by mouth every evening   cefdinir (OMNICEF) 300 MG capsule 3/18/2019 at AM Nursing Home No Yes   Sig: Take 1 capsule (300 mg) by mouth daily for 3 days   Patient taking differently:  Take 300 mg by mouth daily 3/17-3/19   citalopram (CELEXA) 40 MG tablet 3/18/2019 at AM Nursing Home No Yes   Sig: TAKE ONE TABLET BY MOUTH DAILY   erythromycin (ROMYCIN) 5 MG/GM ophthalmic ointment 3/18/2019 at noon Nursing [place] No Yes   Sig: Place Into the left eye 4 times daily for 3 days   Patient taking differently: Place Into the left eye 4 times daily 3/17-3/19   lisinopril (PRINIVIL/ZESTRIL) 10 MG tablet 3/18/2019 at AM Nursing Home No Yes   Sig: Take 1 tablet (10 mg) by mouth daily   metoprolol succinate ER (TOPROL-XL) 100 MG 24 hr tablet 3/18/2019 at AM Nursing Home No Yes   Sig: Take 1 tablet (100 mg) by mouth daily   omeprazole (PRILOSEC) 20 MG capsule 3/18/2019 at AM Nursing Home No Yes   Sig: TAKE ONE CAPSULE BY MOUTH DAILY 30 MINUTES BEFORE BREAKFAST   order for [facility] [place]  Nursing Home No No   Sig: Equipment being ordered: rib belt      Facility-Administered Medications: None     Allergies   Allergies   Allergen Reactions     Fish Allergy      Throat swelled, can tolerate shrimp,crab     Novocain [Procaine Hcl] Anaphylaxis     Face swelled difficulty breathing     Shellfish-Derived Products Shortness Of Breath     Fosamax [Alendronate Sodium]      Levofloxacin      Spreading painful erythema     Penicillins      Unsure of reaction       Social History   I have reviewed this patient's social history and updated it with pertinent information if needed. Laya Lutz  reports that  has never smoked. she has never used smokeless tobacco. She reports that she drinks alcohol. She reports that she does not use drugs.    Family History   I have reviewed this patient's family history and updated it with pertinent information if needed.   No family history on file.    Physical Exam   Temp: 97.7  F (36.5  C) Temp src: Oral BP: 113/74 Pulse: 79 Heart Rate: 76 Resp: 18 SpO2: 99 % O2 Device: None (Room air)    Vital Signs with Ranges  Temp:  [97.7  F (36.5  C)] 97.7  F (36.5  C)  Pulse:  [79-96] 79  Heart Rate:   [75-76] 76  Resp:  [18-23] 18  BP: ()/(69-99) 113/74  SpO2:  [94 %-99 %] 99 %  96 lbs 0 oz    Constitutional: Elderly, cachectic, in mild to moderate respiratory distress, wearing BiPAP, daughter present at the bedside.  Eyes: Conjunctiva and pupils examined and normal.  HEENT: Moist mucous membranes, normal dentition.  Respiratory: Good air entry, end expiratory wheezing bilaterally, no crackles  Cardiovascular: Regular rate and rhythm, normal S1 and S2, and no murmur noted.  GI: Soft, non-distended, non-tender, normal bowel sounds.  Lymph/Hematologic: No anterior cervical or supraclavicular adenopathy.  Skin: No rashes, no cyanosis, no edema.  Musculoskeletal: No joint swelling, erythema or tenderness.  Neurologic: Cranial nerves 2-12 intact, normal strength and sensation.  Psychiatric: Alert, oriented to person, place and time, no obvious anxiety or depression.    Data   Data reviewed today:  I personally reviewed telemetry which was showing normal sinus rhythm, twelve-lead EKG is ordered from      Recent Labs   Lab 03/18/19  1510 03/16/19  0603 03/15/19  1614 03/15/19  0626  03/12/19  0529 03/12/19  0120 03/12/19  0020   WBC 13.7* 9.5  --   --   --  9.5  --   --    HGB 13.2 12.7  --   --   --  11.9  --   --    MCV 91 92  --   --   --  89  --   --     241  --   --   --  219  --   --     132*  --  131*   < >  --  129*  --    POTASSIUM 3.3* 3.9 4.2 3.3*   < >  --  4.4  --    CHLORIDE 98 98  --  95   < >  --  96  --    CO2 22 28  --  28   < >  --  22  --    BUN 34* 44*  --  42*   < >  --  30  --    CR 1.23* 1.50*  --  1.52*   < >  --  1.48*  --    ANIONGAP 13 6  --  8   < >  --  11  --    NANCY 8.7 8.2*  --  8.3*   < >  --  8.1*  --    * 112*  --  115*   < >  --  90  --    ALBUMIN  --   --   --   --   --   --  2.4*  --    PROTTOTAL  --   --   --   --   --   --  6.0*  --    BILITOTAL  --   --   --   --   --   --  0.5  --    ALKPHOS  --   --   --   --   --   --  92  --    ALT  --   --   --   --    --   --  24  --    AST  --   --   --   --   --   --  70*  --    TROPI 0.140*  --   --   --   --  2.632*  --  4.273*    < > = values in this interval not displayed.       Imaging:  Recent Results (from the past 24 hour(s))   Chest CT, IV contrast only - PE protocol    Narrative    CT CHEST PULMONARY EMBOLISM WITH CONTRAST  3/18/2019 5:10 PM     HISTORY: Pulmonary embolism suspected. Intermediate prob, positive  D-dimer. Recent admission for shortness of breath with right-sided  pneumonia, high BNP with possible congestive heart failure, elevated  troponin, etc.      COMPARISON: 3/14/2019 noncontrast exam.    TECHNIQUE: Pulmonary embolism protocol with attention to the pulmonary  arteries.  IV contrast: 48 mL Isovue-370. Coronal reconstructions.  Radiation dose for this scan was reduced using automated exposure  control, adjustment of the mA and/or kV according to patient size, or  iterative reconstruction technique.    FINDINGS: No pulmonary embolism identified. Aortic and coronary artery  calcifications. The contrast bolus is suboptimal for aortic dissection  evaluation. Again there is ascending thoracic aortic ectasia and  minimal descending thoracic aortic aneurysm (3 cm diameter). No  pleural effusion or significant change otherwise.      Impression    IMPRESSION: No PE identified.

## 2019-03-19 NOTE — PLAN OF CARE
Alert but confused, vss, a-febrile, c/o leg pain with activity oxycodone helping, incontinent of bowel and bladder, patient weaned off oxygen now on room air, off IMC, on DD1 with nectar thick liquids, tolerating well, reposition Q 2 hrs, will continue to monitor.

## 2019-03-19 NOTE — PROGRESS NOTES
CLINICAL SWALLOW EVALUATION       03/19/19 1300   General Information   Onset Date 03/18/19   Start of Care Date 03/19/19   Patient Profile Review/OT: Additional Occupational Profile Info See Profile for full history and prior level of function   Patient/Family Goals Statement Patient would like something to drink.    Swallowing Evaluation Bedside swallow evaluation   Behaviorial Observations Alert   Mode of current nutrition NPO   Respiratory Status O2 Supply   Type of O2 supply Nasal cannula   Comments Patient admitted with SOB, chest XR pending, recent hospitalization for pneumonia and NSTEMI.  PMH is significant for paroxysmal atrial fibrillation not on chronic anticoagulation, mild intermittent asthma, HTN, depression, neuropathy, severe osteoarthritis and scoliosis on chronic narcotics, cognitive impairment, hx of Takotsubo cardiomyopathy, and CKD III admitted on 3/11/2019 after being found down on the floor at her [facility].    Clinical Swallow Evaluation   Oral Musculature generally intact   Structural Abnormalities none present   Dentition lower dentures;upper dentures  (lower dentures loose and painful to wear)   Mucosal Quality dry   Mandibular Strength and Mobility intact   Oral Labial Strength and Mobility WFL  (asymmetrical)   Lingual Strength and Mobility impaired protrusion  (pull to right)   Velar Elevation intact   Buccal Strength and Mobility intact   Laryngeal Function Cough;Throat clear;Swallow;Voicing initiated;Dry swallow palpated  (Weak cough initially)   Additional Documentation Yes   Clinical Swallow Eval: Thin Liquid Texture Trial   Mode of Presentation, Thin Liquids cup;spoon;self-fed;fed by clinician   Volume of Liquid or Food Presented 3 oz   Oral Phase of Swallow Premature pharyngeal entry   Pharyngeal Phase of Swallow repeated swallows  (delay)   Diagnostic Statement Throat clear and cough response.    Clinical Swallow Eval: Nectar Thick Liquid Texture Trial   Mode of Presentation, Nectar  fed by clinician;cup;spoon   Volume of Nectar Presented tsps x 5   Oral Phase, Nectar Premature pharyngeal entry   Pharyngeal Phase, [place] intact   Diagnostic Statement No overt Sx of aspiration   Clinical Swallow Eval: Puree Solid Texture Trial   Mode of Presentation, Puree spoon;fed by clinician   Volume of Puree Presented 3 oz   Oral Phase, Puree WFL   Pharyngeal Phase, Puree (delay)   Diagnostic Statement No overt Sx of aspiration with puree, pills with puree.    Swallow Compensations   Swallow Compensations Pacing;Reduce amounts;Alternate viscosity of consistencies   Esophageal Phase of Swallow   Patient reports or presents with symptoms of esophageal dysphagia No   Swallow Eval: Clinical Impressions   Skilled Criteria for Therapy Intervention Skilled criteria met.  Treatment indicated.   Functional Assessment Scale (FAS) 4   Dysphagia Outcome Severity Scale (JAIME) Level 4 - JAIME   Treatment Diagnosis Mild to moderate oropharyngeal dysphagia   Diet texture recommendations Dysphagia diet level 1;Nectar thick liquids   Recommended Feeding/Eating Techniques small sips/bites;maintain upright posture during/after eating for 30 mins  (see below)   Therapy Frequency 5 times/wk   Predicted Duration of Therapy Intervention (days/wks) 1 week   Anticipated Discharge Disposition inpatient rehabilitation facility   Risks and Benefits of Treatment have been explained. Yes   Patient, family and/or staff in agreement with Plan of Care Yes   Clinical Impression Comments Patient known to this service per recent admission.  She continues to present with oropharyngeal dysphagia and aspiration Sx with thin liquids.  Patient refusing dentures at this time due to oral dryness and pain.  Recommend dysphagia diet level 1 with nectar thick liquids, only when alert and upright, close supervision and assistance needed for meals.    Total Evaluation Time   Total Evaluation Time (Minutes) 30

## 2019-03-20 ENCOUNTER — APPOINTMENT (OUTPATIENT)
Dept: PHYSICAL THERAPY | Facility: CLINIC | Age: 84
DRG: 189 | End: 2019-03-20
Payer: MEDICARE

## 2019-03-20 ENCOUNTER — DOCUMENTATION ONLY (OUTPATIENT)
Dept: OTHER | Facility: CLINIC | Age: 84
End: 2019-03-20

## 2019-03-20 ENCOUNTER — AMBULATORY - HEALTHEAST (OUTPATIENT)
Dept: OTHER | Facility: CLINIC | Age: 84
End: 2019-03-20

## 2019-03-20 VITALS
WEIGHT: 89.29 LBS | BODY MASS INDEX: 16.43 KG/M2 | RESPIRATION RATE: 18 BRPM | HEART RATE: 91 BPM | TEMPERATURE: 97.2 F | DIASTOLIC BLOOD PRESSURE: 73 MMHG | HEIGHT: 62 IN | OXYGEN SATURATION: 98 % | SYSTOLIC BLOOD PRESSURE: 139 MMHG

## 2019-03-20 PROBLEM — Z71.89 ACP (ADVANCE CARE PLANNING): Chronic | Status: RESOLVED | Noted: 2017-09-14 | Resolved: 2019-03-20

## 2019-03-20 LAB
GLUCOSE BLDC GLUCOMTR-MCNC: 161 MG/DL (ref 70–99)
GLUCOSE BLDC GLUCOMTR-MCNC: 166 MG/DL (ref 70–99)
GLUCOSE BLDC GLUCOMTR-MCNC: 171 MG/DL (ref 70–99)

## 2019-03-20 PROCEDURE — 99239 HOSP IP/OBS DSCHRG MGMT >30: CPT | Performed by: INTERNAL MEDICINE

## 2019-03-20 PROCEDURE — A9270 NON-COVERED ITEM OR SERVICE: HCPCS | Mod: GY | Performed by: INTERNAL MEDICINE

## 2019-03-20 PROCEDURE — 40000275 ZZH STATISTIC RCP TIME EA 10 MIN

## 2019-03-20 PROCEDURE — 94640 AIRWAY INHALATION TREATMENT: CPT | Mod: 76

## 2019-03-20 PROCEDURE — 25000128 H RX IP 250 OP 636: Performed by: INTERNAL MEDICINE

## 2019-03-20 PROCEDURE — 94640 AIRWAY INHALATION TREATMENT: CPT

## 2019-03-20 PROCEDURE — 25000125 ZZHC RX 250: Performed by: INTERNAL MEDICINE

## 2019-03-20 PROCEDURE — 97530 THERAPEUTIC ACTIVITIES: CPT | Mod: GP

## 2019-03-20 PROCEDURE — 25000132 ZZH RX MED GY IP 250 OP 250 PS 637: Mod: GY | Performed by: INTERNAL MEDICINE

## 2019-03-20 PROCEDURE — C9113 INJ PANTOPRAZOLE SODIUM, VIA: HCPCS | Performed by: INTERNAL MEDICINE

## 2019-03-20 PROCEDURE — 00000146 ZZHCL STATISTIC GLUCOSE BY METER IP

## 2019-03-20 RX ORDER — PREDNISONE 20 MG/1
40 TABLET ORAL DAILY
DISCHARGE
Start: 2019-03-21 | End: 2019-03-24

## 2019-03-20 RX ORDER — PANTOPRAZOLE SODIUM 40 MG/1
40 TABLET, DELAYED RELEASE ORAL
Status: DISCONTINUED | OUTPATIENT
Start: 2019-03-21 | End: 2019-03-20 | Stop reason: HOSPADM

## 2019-03-20 RX ORDER — GABAPENTIN 300 MG/1
300 CAPSULE ORAL AT BEDTIME
DISCHARGE
Start: 2019-03-20

## 2019-03-20 RX ORDER — PROCHLORPERAZINE 25 MG
25 SUPPOSITORY, RECTAL RECTAL EVERY 12 HOURS PRN
Status: DISCONTINUED | OUTPATIENT
Start: 2019-03-20 | End: 2019-03-20 | Stop reason: HOSPADM

## 2019-03-20 RX ORDER — HYDROCODONE BITARTRATE AND ACETAMINOPHEN 10; 325 MG/1; MG/1
1 TABLET ORAL 3 TIMES DAILY PRN
Qty: 3 TABLET | Refills: 0 | Status: SHIPPED | OUTPATIENT
Start: 2019-03-20

## 2019-03-20 RX ORDER — ONDANSETRON 4 MG/1
4 TABLET, ORALLY DISINTEGRATING ORAL EVERY 6 HOURS PRN
DISCHARGE
Start: 2019-03-20

## 2019-03-20 RX ORDER — PROCHLORPERAZINE MALEATE 5 MG
5 TABLET ORAL EVERY 6 HOURS PRN
Status: DISCONTINUED | OUTPATIENT
Start: 2019-03-20 | End: 2019-03-20 | Stop reason: HOSPADM

## 2019-03-20 RX ADMIN — IPRATROPIUM BROMIDE AND ALBUTEROL SULFATE 3 ML: 2.5; .5 SOLUTION RESPIRATORY (INHALATION) at 12:09

## 2019-03-20 RX ADMIN — INSULIN ASPART 1 UNITS: 100 INJECTION, SOLUTION INTRAVENOUS; SUBCUTANEOUS at 13:25

## 2019-03-20 RX ADMIN — OXYCODONE HYDROCHLORIDE 5 MG: 5 TABLET ORAL at 14:57

## 2019-03-20 RX ADMIN — INSULIN ASPART 1 UNITS: 100 INJECTION, SOLUTION INTRAVENOUS; SUBCUTANEOUS at 10:54

## 2019-03-20 RX ADMIN — ACETAMINOPHEN 650 MG: 325 TABLET, FILM COATED ORAL at 11:02

## 2019-03-20 RX ADMIN — AMLODIPINE BESYLATE 5 MG: 5 TABLET ORAL at 11:03

## 2019-03-20 RX ADMIN — ERYTHROMYCIN: 5 OINTMENT OPHTHALMIC at 14:34

## 2019-03-20 RX ADMIN — PROCHLORPERAZINE MALEATE 5 MG: 5 TABLET, FILM COATED ORAL at 14:57

## 2019-03-20 RX ADMIN — METOPROLOL SUCCINATE 100 MG: 100 TABLET, EXTENDED RELEASE ORAL at 11:03

## 2019-03-20 RX ADMIN — METHYLPREDNISOLONE SODIUM SUCCINATE 40 MG: 40 INJECTION, POWDER, FOR SOLUTION INTRAMUSCULAR; INTRAVENOUS at 00:57

## 2019-03-20 RX ADMIN — IPRATROPIUM BROMIDE AND ALBUTEROL SULFATE 3 ML: 2.5; .5 SOLUTION RESPIRATORY (INHALATION) at 08:05

## 2019-03-20 RX ADMIN — ERYTHROMYCIN: 5 OINTMENT OPHTHALMIC at 10:53

## 2019-03-20 RX ADMIN — LISINOPRIL 10 MG: 10 TABLET ORAL at 11:03

## 2019-03-20 RX ADMIN — ASPIRIN 81 MG 81 MG: 81 TABLET ORAL at 11:03

## 2019-03-20 RX ADMIN — PREDNISONE 40 MG: 20 TABLET ORAL at 11:02

## 2019-03-20 RX ADMIN — PANTOPRAZOLE SODIUM 40 MG: 40 INJECTION, POWDER, FOR SOLUTION INTRAVENOUS at 10:56

## 2019-03-20 RX ADMIN — OXYCODONE HYDROCHLORIDE 5 MG: 5 TABLET ORAL at 03:31

## 2019-03-20 RX ADMIN — IPRATROPIUM BROMIDE AND ALBUTEROL SULFATE 3 ML: 2.5; .5 SOLUTION RESPIRATORY (INHALATION) at 03:21

## 2019-03-20 RX ADMIN — ONDANSETRON 4 MG: 2 INJECTION INTRAMUSCULAR; INTRAVENOUS at 10:45

## 2019-03-20 RX ADMIN — IPRATROPIUM BROMIDE AND ALBUTEROL SULFATE 3 ML: 2.5; .5 SOLUTION RESPIRATORY (INHALATION) at 15:10

## 2019-03-20 RX ADMIN — IPRATROPIUM BROMIDE AND ALBUTEROL SULFATE 3 ML: 2.5; .5 SOLUTION RESPIRATORY (INHALATION) at 01:43

## 2019-03-20 ASSESSMENT — ACTIVITIES OF DAILY LIVING (ADL)
ADLS_ACUITY_SCORE: 30

## 2019-03-20 ASSESSMENT — MIFFLIN-ST. JEOR: SCORE: 763.25

## 2019-03-20 NOTE — PLAN OF CARE
Pt. Alert to self and confused. Up with 1-2 with walker/GB. C/o nausea given zofran and compazine. Given tylenol and oxycodone with relief. Discharge to  TCU via HE wheelchair. Daughter at bedside. Pt. Only had a watch for belongings.

## 2019-03-20 NOTE — PLAN OF CARE
Physical Therapy Discharge Summary    Reason for therapy discharge:    Discharged to transitional care facility.    Progress towards therapy goal(s). See goals on Care Plan in [facility] electronic health record for goal details.  Goals not met.  Barriers to achieving goals:   limited tolerance for therapy and discharge from facility.    Therapy recommendation(s):    Continued therapy is recommended.  Rationale/Recommendations:  Continued PT to progress strength and IND with mobility.

## 2019-03-20 NOTE — PLAN OF CARE
Pt d/o and forgetful at times. VSS. Pain managed with oxy. Legs tender to the touch. Mepilex on coccyx. Lungs fine crackles in bilateral lower lobes. No signs of SOB. Tolerating RA. Bowels active, +flatus, +BM. Pure wick intact. Turn and repo. Assist of 1. DD1, nectar thick fluids. Will continue to monitor.

## 2019-03-20 NOTE — PROGRESS NOTES
Pt. Resting well at this time.  No sob noted. Pt talking with ease.  No BIPAP needed at this time.  Will continue to monitor.

## 2019-03-20 NOTE — PLAN OF CARE
SLP: Attempted meal follow up at lunch. Daughter present and reported significant improvement today. Pt alert, however, reported nausea. Attempted strategies to improve nausea and encouraged PO trials, however, pt continued to refuse at this time. Will continue to follow and pt/daughter verbalized agreement with SLP POC.

## 2019-03-20 NOTE — PLAN OF CARE
A&O with some confusion, VSS on RA. On continuous pulse ox. Lung sounds coarse. IS and cough and deep breathe encouraged. Tolerating DD1 diet with nectar thick liquids. Incontinent of bowel and bladder, pure wick in place. Redness to coccyx, covered with mepilex, CDI. Dressing to toes CDI. Denied need for PRN pain management, repositioned to improve comfort. Up with assist of 1. Turn and reposition q2h.

## 2019-03-20 NOTE — PROVIDER NOTIFICATION
MD Notification    Notified Person: MD    Notified Person Name: Dr. Baeza     Notification Date/Time: March 20, 2019 2:26 PM    Notification Interaction: page    Purpose of Notification: Pt. nauseated, given Zofran not resolved, Can you please place order for PRN Compazine PO and IV.     Orders Received: pending    Comments:

## 2019-03-20 NOTE — PROGRESS NOTES
SW:    I: GIRISH following for discharge planning. Pt has been accepted at [facility] for TCU, pt was admitted from this location. GIRISH met with pt and confirmed that this is where she would like to return, as [facility] [place] [facility] [place] [facility]. SW inquired about transportation and pt shared that she would like  transport arranged. SW will plan to connect with pt daughter re: discharge plan.    P: SW to follow. Anticipate discharge back to [facility] when medically appropriate.    ADDENDUM: Long conversation had with pt daughter, Lia ([phone]). Lia discussed at length concerns re: pt returning to [facility] today as does not feel she is medically appropriate to leave the hospital. Many questions regarding medications. Assured Lia that when this writer visited her that her mother was not in distress and appeared comfortable. Lia plans to come to [facility] today, encouraged her to ask to speak to provider again (Dr. Baeza did call her this morning) if she continues to have questions re: discharge.    Pt and family desire return to [facility] if medically appropriate. GIRISH arranged HE wc transport for 1500, though pt daughter shared that she will talk to her mother and is willing to bring her if pt agreeable.    ADDENDUM: GIRISH received call from [facility] admissions stating that pt unable to admit today due to the need for Bipap recently. SW discussed that she has not had any Bipap or oxygen for greater than 24hrs. NP at facility not comfortable with this, and when asked if pt would be okay to admit tomorrow if remains off bipap, GIRISH informed that facility will not be able to accept pt at any time due to her history of needing the Bipap. GIRISH requested that NP at facility call our hospitalist to discuss.    1510: Hospitalist talked to NP at [facility] and now fine with her discharging today. GIRISH rescheduled HE transport for 1515. GIRISH faxed discharge orders to facility  admissions ([phone]). PAS completed within 60 days.    CLAY Young, [facility]  Daytime (8:00am-4:30pm): [phone]  After-Hours SW Pager (4:30pm-11:30pm): [phone]

## 2019-03-20 NOTE — PLAN OF CARE
Discharge Planner PT   Patient plan for discharge: None stated. Patient not able to tell therapist where she lives.   Current status: Pt seated in bedside chair on arrival, no walker or GB in room. Environment set-up for mobility. Pt performs 4 sit<>stand from bedside chair to FWW mod-A progressing to min-A. Complaints of how her hair is done and being cold throughout, declines further activity due to these complaints. Extra blanket provided and room temp increase, nursing notified of hair complaint.  Barriers to return to prior living situation: Current level of assist needed, decreased cognition, very limited safety awareness.   Recommendations for discharge: TCU  Rationale for recommendations: Patient currently below baseline based on information in chart. She was admitted from TCU as she was recently admitted from 3?11-3/16.        Entered by: Josh Yoon 03/20/2019 9:53 AM

## 2019-03-21 LAB — INTERPRETATION ECG - MUSE: NORMAL

## 2019-03-22 ENCOUNTER — RECORDS - HEALTHEAST (OUTPATIENT)
Dept: LAB | Facility: CLINIC | Age: 84
End: 2019-03-22

## 2019-03-24 LAB
BACTERIA SPEC CULT: NO GROWTH
Lab: NORMAL
SPECIMEN SOURCE: NORMAL

## 2019-03-25 ENCOUNTER — RECORDS - HEALTHEAST (OUTPATIENT)
Dept: LAB | Facility: CLINIC | Age: 84
End: 2019-03-25

## 2019-03-25 LAB
ANION GAP SERPL CALCULATED.3IONS-SCNC: 10 MMOL/L (ref 5–18)
BASOPHILS # BLD AUTO: 0.1 THOU/UL (ref 0–0.2)
BASOPHILS NFR BLD AUTO: 0 % (ref 0–2)
BUN SERPL-MCNC: 40 MG/DL (ref 8–28)
C DIFF TOX B STL QL: NEGATIVE
CALCIUM SERPL-MCNC: 8.8 MG/DL (ref 8.5–10.5)
CHLORIDE BLD-SCNC: 98 MMOL/L (ref 98–107)
CO2 SERPL-SCNC: 27 MMOL/L (ref 22–31)
CREAT SERPL-MCNC: 1.51 MG/DL (ref 0.6–1.1)
EOSINOPHIL # BLD AUTO: 0.2 THOU/UL (ref 0–0.4)
EOSINOPHIL NFR BLD AUTO: 1 % (ref 0–6)
ERYTHROCYTE [DISTWIDTH] IN BLOOD BY AUTOMATED COUNT: 14.1 % (ref 11–14.5)
GFR SERPL CREATININE-BSD FRML MDRD: 32 ML/MIN/1.73M2
GLUCOSE BLD-MCNC: 100 MG/DL (ref 70–125)
HCT VFR BLD AUTO: 45.8 % (ref 35–47)
HGB BLD-MCNC: 14.4 G/DL (ref 12–16)
LYMPHOCYTES # BLD AUTO: 2.1 THOU/UL (ref 0.8–4.4)
LYMPHOCYTES NFR BLD AUTO: 11 % (ref 20–40)
MCH RBC QN AUTO: 30.4 PG (ref 27–34)
MCHC RBC AUTO-ENTMCNC: 31.4 G/DL (ref 32–36)
MCV RBC AUTO: 97 FL (ref 80–100)
MONOCYTES # BLD AUTO: 1.3 THOU/UL (ref 0–0.9)
MONOCYTES NFR BLD AUTO: 7 % (ref 2–10)
NEUTROPHILS # BLD AUTO: 15.5 THOU/UL (ref 2–7.7)
NEUTROPHILS NFR BLD AUTO: 81 % (ref 50–70)
PLATELET # BLD AUTO: 304 THOU/UL (ref 140–440)
PMV BLD AUTO: 10 FL (ref 8.5–12.5)
POTASSIUM BLD-SCNC: 3.9 MMOL/L (ref 3.5–5)
RBC # BLD AUTO: 4.73 MILL/UL (ref 3.8–5.4)
RIBOTYPE 027/NAP1/BI: NORMAL
SODIUM SERPL-SCNC: 135 MMOL/L (ref 136–145)
WBC: 19.4 THOU/UL (ref 4–11)

## 2019-03-27 ENCOUNTER — RECORDS - HEALTHEAST (OUTPATIENT)
Dept: LAB | Facility: CLINIC | Age: 84
End: 2019-03-27

## 2019-03-27 LAB
ANION GAP SERPL CALCULATED.3IONS-SCNC: 9 MMOL/L (ref 5–18)
BUN SERPL-MCNC: 34 MG/DL (ref 8–28)
CALCIUM SERPL-MCNC: 9 MG/DL (ref 8.5–10.5)
CHLORIDE BLD-SCNC: 96 MMOL/L (ref 98–107)
CO2 SERPL-SCNC: 29 MMOL/L (ref 22–31)
CREAT SERPL-MCNC: 1.39 MG/DL (ref 0.6–1.1)
GFR SERPL CREATININE-BSD FRML MDRD: 35 ML/MIN/1.73M2
GLUCOSE BLD-MCNC: 122 MG/DL (ref 70–125)
POTASSIUM BLD-SCNC: 4.6 MMOL/L (ref 3.5–5)
SODIUM SERPL-SCNC: 134 MMOL/L (ref 136–145)

## 2019-03-29 ENCOUNTER — RECORDS - HEALTHEAST (OUTPATIENT)
Dept: LAB | Facility: CLINIC | Age: 84
End: 2019-03-29

## 2019-03-29 LAB
ANION GAP SERPL CALCULATED.3IONS-SCNC: 5 MMOL/L (ref 5–18)
BASOPHILS # BLD AUTO: 0 THOU/UL (ref 0–0.2)
BASOPHILS NFR BLD AUTO: 0 % (ref 0–2)
BUN SERPL-MCNC: 32 MG/DL (ref 8–28)
CALCIUM SERPL-MCNC: 9.2 MG/DL (ref 8.5–10.5)
CHLORIDE BLD-SCNC: 95 MMOL/L (ref 98–107)
CO2 SERPL-SCNC: 33 MMOL/L (ref 22–31)
CREAT SERPL-MCNC: 1.36 MG/DL (ref 0.6–1.1)
EOSINOPHIL # BLD AUTO: 0.2 THOU/UL (ref 0–0.4)
EOSINOPHIL NFR BLD AUTO: 1 % (ref 0–6)
ERYTHROCYTE [DISTWIDTH] IN BLOOD BY AUTOMATED COUNT: 14.1 % (ref 11–14.5)
GFR SERPL CREATININE-BSD FRML MDRD: 36 ML/MIN/1.73M2
GLUCOSE BLD-MCNC: 87 MG/DL (ref 70–125)
HCT VFR BLD AUTO: 39.7 % (ref 35–47)
HGB BLD-MCNC: 12.4 G/DL (ref 12–16)
LYMPHOCYTES # BLD AUTO: 1.8 THOU/UL (ref 0.8–4.4)
LYMPHOCYTES NFR BLD AUTO: 16 % (ref 20–40)
MCH RBC QN AUTO: 30.5 PG (ref 27–34)
MCHC RBC AUTO-ENTMCNC: 31.2 G/DL (ref 32–36)
MCV RBC AUTO: 98 FL (ref 80–100)
MONOCYTES # BLD AUTO: 0.9 THOU/UL (ref 0–0.9)
MONOCYTES NFR BLD AUTO: 8 % (ref 2–10)
NEUTROPHILS # BLD AUTO: 8.3 THOU/UL (ref 2–7.7)
NEUTROPHILS NFR BLD AUTO: 75 % (ref 50–70)
PLATELET # BLD AUTO: 222 THOU/UL (ref 140–440)
PMV BLD AUTO: 10 FL (ref 8.5–12.5)
POTASSIUM BLD-SCNC: 5.7 MMOL/L (ref 3.5–5)
RBC # BLD AUTO: 4.06 MILL/UL (ref 3.8–5.4)
SODIUM SERPL-SCNC: 133 MMOL/L (ref 136–145)
WBC: 11.3 THOU/UL (ref 4–11)

## 2019-03-30 ENCOUNTER — RECORDS - HEALTHEAST (OUTPATIENT)
Dept: LAB | Facility: CLINIC | Age: 84
End: 2019-03-30

## 2019-03-30 LAB — POTASSIUM BLD-SCNC: 4.7 MMOL/L (ref 3.5–5)

## 2019-04-01 ENCOUNTER — RECORDS - HEALTHEAST (OUTPATIENT)
Dept: LAB | Facility: CLINIC | Age: 84
End: 2019-04-01

## 2019-04-01 LAB
ANION GAP SERPL CALCULATED.3IONS-SCNC: 9 MMOL/L (ref 5–18)
BUN SERPL-MCNC: 42 MG/DL (ref 8–28)
CALCIUM SERPL-MCNC: 9.1 MG/DL (ref 8.5–10.5)
CHLORIDE BLD-SCNC: 95 MMOL/L (ref 98–107)
CO2 SERPL-SCNC: 31 MMOL/L (ref 22–31)
CREAT SERPL-MCNC: 1.36 MG/DL (ref 0.6–1.1)
GFR SERPL CREATININE-BSD FRML MDRD: 36 ML/MIN/1.73M2
GLUCOSE BLD-MCNC: 88 MG/DL (ref 70–125)
POTASSIUM BLD-SCNC: 4.6 MMOL/L (ref 3.5–5)
SODIUM SERPL-SCNC: 135 MMOL/L (ref 136–145)

## 2019-04-04 ENCOUNTER — RECORDS - HEALTHEAST (OUTPATIENT)
Dept: LAB | Facility: CLINIC | Age: 84
End: 2019-04-04

## 2019-04-05 ENCOUNTER — RECORDS - HEALTHEAST (OUTPATIENT)
Dept: LAB | Facility: CLINIC | Age: 84
End: 2019-04-05

## 2019-04-05 LAB
ANION GAP SERPL CALCULATED.3IONS-SCNC: 9 MMOL/L (ref 5–18)
BUN SERPL-MCNC: 34 MG/DL (ref 8–28)
CALCIUM SERPL-MCNC: 9 MG/DL (ref 8.5–10.5)
CHLORIDE BLD-SCNC: 95 MMOL/L (ref 98–107)
CO2 SERPL-SCNC: 30 MMOL/L (ref 22–31)
CREAT SERPL-MCNC: 1.21 MG/DL (ref 0.6–1.1)
GFR SERPL CREATININE-BSD FRML MDRD: 42 ML/MIN/1.73M2
GLUCOSE BLD-MCNC: 90 MG/DL (ref 70–125)
POTASSIUM BLD-SCNC: 4.6 MMOL/L (ref 3.5–5)
SODIUM SERPL-SCNC: 134 MMOL/L (ref 136–145)

## 2019-04-08 LAB
ANION GAP SERPL CALCULATED.3IONS-SCNC: 14 MMOL/L (ref 5–18)
BUN SERPL-MCNC: 37 MG/DL (ref 8–28)
CALCIUM SERPL-MCNC: 9.3 MG/DL (ref 8.5–10.5)
CHLORIDE BLD-SCNC: 93 MMOL/L (ref 98–107)
CO2 SERPL-SCNC: 23 MMOL/L (ref 22–31)
CREAT SERPL-MCNC: 1.37 MG/DL (ref 0.6–1.1)
GFR SERPL CREATININE-BSD FRML MDRD: 36 ML/MIN/1.73M2
GLUCOSE BLD-MCNC: 94 MG/DL (ref 70–125)
POTASSIUM BLD-SCNC: 4.7 MMOL/L (ref 3.5–5)
SODIUM SERPL-SCNC: 130 MMOL/L (ref 136–145)

## 2019-04-11 ENCOUNTER — RECORDS - HEALTHEAST (OUTPATIENT)
Dept: LAB | Facility: CLINIC | Age: 84
End: 2019-04-11

## 2019-04-12 LAB
ANION GAP SERPL CALCULATED.3IONS-SCNC: 9 MMOL/L (ref 5–18)
BUN SERPL-MCNC: 32 MG/DL (ref 8–28)
CALCIUM SERPL-MCNC: 9.1 MG/DL (ref 8.5–10.5)
CHLORIDE BLD-SCNC: 95 MMOL/L (ref 98–107)
CO2 SERPL-SCNC: 29 MMOL/L (ref 22–31)
CREAT SERPL-MCNC: 1.24 MG/DL (ref 0.6–1.1)
GFR SERPL CREATININE-BSD FRML MDRD: 40 ML/MIN/1.73M2
GLUCOSE BLD-MCNC: 93 MG/DL (ref 70–125)
POTASSIUM BLD-SCNC: 4.3 MMOL/L (ref 3.5–5)
SODIUM SERPL-SCNC: 133 MMOL/L (ref 136–145)

## 2019-04-15 ENCOUNTER — RECORDS - HEALTHEAST (OUTPATIENT)
Dept: LAB | Facility: CLINIC | Age: 84
End: 2019-04-15

## 2019-04-15 LAB
T4 FREE SERPL-MCNC: 0.6 NG/DL (ref 0.7–1.8)
TSH SERPL DL<=0.005 MIU/L-ACNC: 30.79 UIU/ML (ref 0.3–5)

## 2024-12-20 NOTE — DISCHARGE SUMMARY
[facility]  Hospitalist Discharge Summary       Date of Admission:  3/18/2019  Date of Discharge:  3/20/2019  Discharging Provider: Mauro Baeza DO      Discharge Diagnoses   1. Mild intermittent asthma with exacerbation   2. Acute hypoxic respiratory failure due to above  3. Elevated troponin, likely due to recent NSTEMI type II during previous hospitalization   4. 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 HTN  6. HLP   7. H/o Atrial fibrillation   8. Generalized weakness and dysphagia related to deconditioning  9. Depression   10. Severe Malnutrition     Follow-ups Needed After Discharge   Follow-up Appointments     Follow Up and recommended labs and tests      Follow up with Nursing home physician.  No follow up labs or test are   needed.  Follow up with PCP 1-2 weeks after TCU discharge             Unresulted Labs Ordered in the Past 30 Days of this Admission     Date and Time Order Name Status Description    3/18/2019 1529 Blood culture Preliminary     3/11/2019 1100 T4 free In process       These results will be followed up by PCP     Discharge Disposition   Discharged to short-term care facility  Condition at discharge: Stable    Hospital Course      Laya Lutz is a 89 year old female with complicated past medical history of paroxysmal atrial fibrillation, not on anticoagulation, mild intermittent asthma, essential hypertension, depression, neuropathy, severe osteoarthritis and scoliosis on chronic narcotics, cognitive impairment, history of tachycardia induced cardiomyopathy, chronic kidney disease stage III with recent hospitalization from March 11-16 for urinary tract infection with Klebsiella pneumonia, right upper lobe pneumonia with sepsis and acute hypoxic respiratory failure who was able to be weaned off of oxygen before discharge. Patient was admitted from the emergency room today from acute hypoxic respiratory failure most likely associated with asthma  No exacerbation with mildly elevated BNP    Acute respiratory failure with hypoxia  Mild intermittent asthma with exacerbation  Patient presented with shortness of breath and found to have acute respiratory failure thought to be due to asthma exacerbation.  Patient was requiring 4 L of oxygen, but was then placed on BiPAP for comfort due to respiratory distress.  CT scan of the chest was done which was negative for pulmonary embolism and infiltrates.  Of note, patient was on Rocephin during her last hospitalization and then was discharged on Omnicef to complete the course for pneumonia and UTI secondary to Klebsiella pneumonia.  - Switched to IV Ceftriaxone and finished her course of antibiotics while here  - Stopped Lasix   - Titrated off of O2 on 3/19/19 and remained off until discharge   - Scheduled Duonebs  - Initially treated with IV Solu-Medrol and switched to Prednisone on day of discharge    Elevated troponin  Recently hospitalized a week ago and at that time had a peak troponin of almost 5.  Patient was seen by cardiology and an echocardiogram was obtained (see below).  It was felt that this elevation was due to increased demand (NSTEMI type II).  On presentation her troponin still slightly elevated at 0.14 and still down trending to 0.131.  Suspect that this troponin elevation is due to continued down trend in the setting of CKD    Stress-induced cardiomyopathy.  Not decompensated  HTN   HLP   H/o Atrial fibrillation   Echocardiogram done during last hospitalization on March 11 showed ejection fraction around 25-30%, there is anterior, septal and apical wall akinesis, moderately dilated left ventricle, mildly dilated left atrium and moderate 2+ mitral regurgitation.  Her BNP was elevated around 54,000 during her last hospitalization which is down to 15,000 on current admission   - Restarted PTA Lisinopril, Toprol XL, Norvasc, ASA and Lipitor     Generalized weakness & Dysphagia  Most likely deconditioning due  to recent hospitalization   - Discharge back to TCU once medically stable     Depression   - Restart PTA Celexa       Consultations This Hospital Stay   SOCIAL WORK IP CONSULT  PHYSICAL THERAPY ADULT IP CONSULT  OCCUPATIONAL THERAPY ADULT IP CONSULT  SPEECH LANGUAGE PATH ADULT IP CONSULT  NUTRITION SERVICES ADULT IP CONSULT  WOUND OSTOMY CONTINENCE NURSE  IP CONSULT  PHYSICAL THERAPY ADULT IP CONSULT  OCCUPATIONAL THERAPY ADULT IP CONSULT  SPEECH LANGUAGE PATH ADULT IP CONSULT    Code Status   DNR/DNI    Time Spent on this Encounter   I, Mauro Baeza, personally saw the patient today and spent greater than 30 minutes discharging this patient.  On day of discharge had a long conversation with patient and daughter regarding the daughter's concern for discharge.  Explained to her that the patient's vitals looked great, she was satting well on room air and tolerating diet.  Answered all questions and concerns and patient and daughter were both okay with discharge.        Mauro Baeza, DO  [facility]  ______________________________________________________________________    Physical Exam   Vital Signs: Temp: 97.2  F (36.2  C) Temp src: Oral BP: 139/73 Pulse: 91 Heart Rate: 61 Resp: 18 SpO2: 98 % O2 Device: None (Room air)    Weight: 89 lbs 4.58 oz  General Appearance: Resting comfortably.  NAD   Respiratory: Clear to auscultation.  No respiratory distress on RA   Cardiovascular: RRR.  No obvious murmurs  GI: Bowel sounds present.  No tenderness   Skin: No rashes.  No cyanosis   Other: No edema         Primary Care Physician   Dayton Huston    Immunizations       Discharge Orders      General info for SNF    Length of Stay Estimate: Short Term Care: Estimated # of Days <30  Condition at Discharge: Stable  Level of care:skilled   Rehabilitation Potential: Fair  Admission H&P remains valid and up-to-date: Yes  Recent Chemotherapy: N/A  Use Nursing Home Standing Orders: Yes      Mantoux instructions    Give two-step Mantoux (PPD) Per Facility Policy Yes     Reason for your hospital stay    Asthma exacerbation     Activity - Up with nursing assistance     Follow Up and recommended labs and tests    Follow up with Nursing home physician.  No follow up labs or test are needed.  Follow up with PCP 1-2 weeks after TCU discharge     DNR/DNI     Physical Therapy Adult Consult    Evaluate and treat as clinically indicated.    Reason:  Deconditioning     Occupational Therapy Adult Consult    Evaluate and treat as clinically indicated.    Reason:  Deconditioning     Speech Language Path Adult Consult    Evaluate and treat as clinically indicated.    Reason:  Deconditioning     Advance Diet as Tolerated    Follow this diet upon discharge: Orders Placed This Encounter      Snacks/Supplements Adult: Boost Plus; Between Meals      Room Service      Combination Diet Dysphagia Diet Level 1: Pureed; Nectar Thickened Liquids (pre-thickened or use instant food thickener)       Significant Results and Procedures   Most Recent 3 CBC's:  Recent Labs   Lab Test 03/19/19  0615 03/18/19  1510 03/16/19  0603   WBC 6.4 13.7* 9.5   HGB 13.2 13.2 12.7   MCV 90 91 92    268 241     Most Recent 3 BMP's:  Recent Labs   Lab Test 03/19/19  0615 03/18/19  1510 03/16/19  0603   * 133 132*   POTASSIUM 3.4 3.3* 3.9   CHLORIDE 99 98 98   CO2 23 22 28   BUN 38* 34* 44*   CR 1.35* 1.23* 1.50*   ANIONGAP 10 13 6   NANCY 8.5 8.7 8.2*   * 137* 112*   ,   Results for orders placed or performed during the hospital encounter of 03/18/19   Chest CT, IV contrast only - PE protocol    Narrative    CT CHEST PULMONARY EMBOLISM WITH CONTRAST  3/18/2019 5:10 PM     HISTORY: Pulmonary embolism suspected. Intermediate prob, positive  D-dimer. Recent admission for shortness of breath with right-sided  pneumonia, high BNP with possible congestive heart failure, elevated  troponin, etc.      COMPARISON: 3/14/2019 noncontrast  exam.    TECHNIQUE: Pulmonary embolism protocol with attention to the pulmonary  arteries.  IV contrast: 48 mL Isovue-370. Coronal reconstructions.  Radiation dose for this scan was reduced using automated exposure  control, adjustment of the mA and/or kV according to patient size, or  iterative reconstruction technique.    FINDINGS: No pulmonary embolism identified. Aortic and coronary artery  calcifications. The contrast bolus is suboptimal for aortic dissection  evaluation. Again there is ascending thoracic aortic ectasia and  minimal descending thoracic aortic aneurysm (3 cm diameter). No  pleural effusion or significant change otherwise.      Impression    IMPRESSION: No PE identified.    DON NUNEZ MD   XR Chest Port 1 View    Narrative    CHEST ONE VIEW SUPINE 3/19/2019 8:34 AM     HISTORY: Follow up on acute hypoxic respiratory failure.    COMPARISON: March 13, 2019      Impression    IMPRESSION: No acute disease.    CLAYTON JONES MD       Discharge Medications   Current Discharge Medication List      START taking these medications    Details   gabapentin (NEURONTIN) 300 MG capsule Take 1 capsule (300 mg) by mouth At Bedtime    Comments: Future refills by PCP Dr. Dayton Huston with phone number [phone].  Associated Diagnoses: Lumbar disc disease with radiculopathy      ondansetron (ZOFRAN-ODT) 4 MG ODT tab Take 1 tablet (4 mg) by mouth every 6 hours as needed for nausea or vomiting    Associated Diagnoses: Mild intermittent asthma with exacerbation      predniSONE (DELTASONE) 20 MG tablet Take 40 mg by mouth daily for 3 days.    Comments: Please include the quantity of tablets and (strength) per dose in sig.  Associated Diagnoses: Mild intermittent asthma with exacerbation         CONTINUE these medications which have CHANGED    Details   HYDROcodone-acetaminophen (NORCO)  MG per tablet Take 1 tablet by mouth 3 times daily as needed for severe pain  Qty: 3 tablet, Refills: 0     Comments: Future refills by PCP Dr. Dayton Huston with phone number [phone].  Associated Diagnoses: Lumbar disc disease with radiculopathy         CONTINUE these medications which have NOT CHANGED    Details   acetaminophen (TYLENOL) 325 MG tablet Take 2 tablets (650 mg) by mouth every 4 hours as needed for mild pain  Qty: 100 tablet    Associated Diagnoses: Lumbar disc disease with radiculopathy      albuterol (PROAIR HFA) 108 (90 BASE) MCG/ACT inhaler INHALE 2 PUFFS BY MOUTH EVERY 4 TO 6 HOURS AS NEEDED FOR WHEEZE  Qty: 2 Inhaler, Refills: 4    Associated Diagnoses: Intermittent asthma      amLODIPine (NORVASC) 5 MG tablet Take 5 mg by mouth daily      aspirin (ASA) 81 MG chewable tablet Take 1 tablet (81 mg) by mouth daily    Associated Diagnoses: ACS (acute coronary syndrome) (H)      atorvastatin (LIPITOR) 20 MG tablet Take 1 tablet (20 mg) by mouth every evening    Associated Diagnoses: ACS (acute coronary syndrome) (H)      citalopram (CELEXA) 40 MG tablet TAKE ONE TABLET BY MOUTH DAILY  Qty: 90 tablet, Refills: 0    Associated Diagnoses: Depressive disorder, not elsewhere classified      lisinopril (PRINIVIL/ZESTRIL) 10 MG tablet Take 1 tablet (10 mg) by mouth daily    Associated Diagnoses: ACS (acute coronary syndrome) (H); Essential hypertension      metoprolol succinate ER (TOPROL-XL) 100 MG 24 hr tablet Take 1 tablet (100 mg) by mouth daily    Associated Diagnoses: ACS (acute coronary syndrome) (H); Essential hypertension      Multiple Vitamins-Minerals (CENTRUM SILVER) per tablet Take 1 tablet by mouth daily.      omeprazole (PRILOSEC) 20 MG capsule TAKE ONE CAPSULE BY MOUTH DAILY 30 MINUTES BEFORE BREAKFAST  Qty: 30 capsule, Refills: 0    Associated Diagnoses: GERD (gastroesophageal reflux disease)      order for DME Equipment being ordered: rib belt  Qty: 1 Device, Refills: 0    Associated Diagnoses: Closed fracture of multiple ribs of right side, initial encounter; Rib pain on  right side         STOP taking these medications       cefdinir (OMNICEF) 300 MG capsule Comments:   Reason for Stopping:         erythromycin (ROMYCIN) 5 MG/GM ophthalmic ointment Comments:   Reason for Stopping:             Allergies   Allergies   Allergen Reactions     Fish Allergy      Throat swelled, can tolerate shrimp,crab     Novocain [Procaine Hcl] Anaphylaxis     Face swelled difficulty breathing     Shellfish-Derived Products Shortness Of Breath     Fosamax [Alendronate Sodium]      Levofloxacin      Spreading painful erythema     Penicillins      Unsure of reaction